# Patient Record
Sex: FEMALE | Race: BLACK OR AFRICAN AMERICAN | NOT HISPANIC OR LATINO | Employment: OTHER | URBAN - METROPOLITAN AREA
[De-identification: names, ages, dates, MRNs, and addresses within clinical notes are randomized per-mention and may not be internally consistent; named-entity substitution may affect disease eponyms.]

---

## 2017-08-14 ENCOUNTER — TRANSCRIBE ORDERS (OUTPATIENT)
Dept: ADMINISTRATIVE | Facility: HOSPITAL | Age: 62
End: 2017-08-14

## 2017-08-14 DIAGNOSIS — E04.1 THYROID NODULE: Primary | ICD-10-CM

## 2017-08-21 ENCOUNTER — HOSPITAL ENCOUNTER (OUTPATIENT)
Dept: ULTRASOUND IMAGING | Facility: HOSPITAL | Age: 62
Discharge: HOME | End: 2017-08-21
Attending: INTERNAL MEDICINE | Admitting: INTERNAL MEDICINE

## 2017-08-21 DIAGNOSIS — E04.1 THYROID NODULE: ICD-10-CM

## 2017-08-21 PROCEDURE — 76536 US EXAM OF HEAD AND NECK: CPT

## 2020-01-28 ENCOUNTER — HOSPITAL ENCOUNTER (OUTPATIENT)
Dept: GENERAL RADIOLOGY | Facility: HOSPITAL | Age: 65
Discharge: HOME | End: 2020-01-28

## 2020-01-28 ENCOUNTER — HOSPITAL ENCOUNTER (OUTPATIENT)
Dept: GENERAL RADIOLOGY | Facility: HOSPITAL | Age: 65
Discharge: HOME | End: 2020-01-28
Admitting: ORTHOPAEDIC SURGERY

## 2020-01-28 ENCOUNTER — APPOINTMENT (OUTPATIENT)
Dept: PREADMISSION TESTING | Facility: HOSPITAL | Age: 65
End: 2020-01-28

## 2020-01-28 VITALS
BODY MASS INDEX: 30.49 KG/M2 | HEART RATE: 100 BPM | OXYGEN SATURATION: 95 % | DIASTOLIC BLOOD PRESSURE: 73 MMHG | WEIGHT: 183 LBS | RESPIRATION RATE: 16 BRPM | TEMPERATURE: 97.4 F | SYSTOLIC BLOOD PRESSURE: 124 MMHG | HEIGHT: 65 IN

## 2020-01-28 LAB
ABO GROUP BLD: NORMAL
ALBUMIN SERPL-MCNC: 4 G/DL (ref 3.5–5.2)
ALBUMIN/GLOB SERPL: 1.3 G/DL
ALP SERPL-CCNC: 82 U/L (ref 39–117)
ALT SERPL W P-5'-P-CCNC: 20 U/L (ref 1–33)
ANION GAP SERPL CALCULATED.3IONS-SCNC: 12 MMOL/L (ref 5–15)
APTT PPP: 27 SECONDS (ref 22.7–35.4)
AST SERPL-CCNC: 19 U/L (ref 1–32)
BACTERIA UR QL AUTO: ABNORMAL /HPF
BASOPHILS # BLD AUTO: 0.03 10*3/MM3 (ref 0–0.2)
BASOPHILS NFR BLD AUTO: 0.4 % (ref 0–1.5)
BILIRUB SERPL-MCNC: 0.4 MG/DL (ref 0.2–1.2)
BILIRUB UR QL STRIP: NEGATIVE
BLD GP AB SCN SERPL QL: NEGATIVE
BUN BLD-MCNC: 13 MG/DL (ref 8–23)
BUN/CREAT SERPL: 14.8 (ref 7–25)
CALCIUM SPEC-SCNC: 8.8 MG/DL (ref 8.6–10.5)
CHLORIDE SERPL-SCNC: 103 MMOL/L (ref 98–107)
CLARITY UR: CLEAR
CO2 SERPL-SCNC: 27 MMOL/L (ref 22–29)
COLOR UR: YELLOW
CREAT BLD-MCNC: 0.88 MG/DL (ref 0.57–1)
DEPRECATED RDW RBC AUTO: 39.6 FL (ref 37–54)
EOSINOPHIL # BLD AUTO: 0.32 10*3/MM3 (ref 0–0.4)
EOSINOPHIL NFR BLD AUTO: 4.2 % (ref 0.3–6.2)
ERYTHROCYTE [DISTWIDTH] IN BLOOD BY AUTOMATED COUNT: 12.4 % (ref 12.3–15.4)
GFR SERPL CREATININE-BSD FRML MDRD: 79 ML/MIN/1.73
GLOBULIN UR ELPH-MCNC: 3 GM/DL
GLUCOSE BLD-MCNC: 121 MG/DL (ref 65–99)
GLUCOSE UR STRIP-MCNC: NEGATIVE MG/DL
HCT VFR BLD AUTO: 39.3 % (ref 34–46.6)
HGB BLD-MCNC: 13.1 G/DL (ref 12–15.9)
HGB UR QL STRIP.AUTO: NEGATIVE
HYALINE CASTS UR QL AUTO: ABNORMAL /LPF
IMM GRANULOCYTES # BLD AUTO: 0.01 10*3/MM3 (ref 0–0.05)
IMM GRANULOCYTES NFR BLD AUTO: 0.1 % (ref 0–0.5)
INR PPP: 1.08 (ref 0.9–1.1)
KETONES UR QL STRIP: NEGATIVE
LEUKOCYTE ESTERASE UR QL STRIP.AUTO: ABNORMAL
LYMPHOCYTES # BLD AUTO: 2.19 10*3/MM3 (ref 0.7–3.1)
LYMPHOCYTES NFR BLD AUTO: 29 % (ref 19.6–45.3)
MCH RBC QN AUTO: 29.5 PG (ref 26.6–33)
MCHC RBC AUTO-ENTMCNC: 33.3 G/DL (ref 31.5–35.7)
MCV RBC AUTO: 88.5 FL (ref 79–97)
MONOCYTES # BLD AUTO: 0.49 10*3/MM3 (ref 0.1–0.9)
MONOCYTES NFR BLD AUTO: 6.5 % (ref 5–12)
NEUTROPHILS # BLD AUTO: 4.52 10*3/MM3 (ref 1.7–7)
NEUTROPHILS NFR BLD AUTO: 59.8 % (ref 42.7–76)
NITRITE UR QL STRIP: NEGATIVE
NRBC BLD AUTO-RTO: 0 /100 WBC (ref 0–0.2)
PH UR STRIP.AUTO: 5.5 [PH] (ref 5–8)
PLATELET # BLD AUTO: 285 10*3/MM3 (ref 140–450)
PMV BLD AUTO: 9.8 FL (ref 6–12)
POTASSIUM BLD-SCNC: 4 MMOL/L (ref 3.5–5.2)
PROT SERPL-MCNC: 7 G/DL (ref 6–8.5)
PROT UR QL STRIP: NEGATIVE
PROTHROMBIN TIME: 13.7 SECONDS (ref 11.7–14.2)
RBC # BLD AUTO: 4.44 10*6/MM3 (ref 3.77–5.28)
RBC # UR: ABNORMAL /HPF
REF LAB TEST METHOD: ABNORMAL
RH BLD: POSITIVE
SODIUM BLD-SCNC: 142 MMOL/L (ref 136–145)
SP GR UR STRIP: 1.02 (ref 1–1.03)
SQUAMOUS #/AREA URNS HPF: ABNORMAL /HPF
T&S EXPIRATION DATE: NORMAL
UROBILINOGEN UR QL STRIP: ABNORMAL
WBC NRBC COR # BLD: 7.56 10*3/MM3 (ref 3.4–10.8)
WBC UR QL AUTO: ABNORMAL /HPF

## 2020-01-28 PROCEDURE — 85730 THROMBOPLASTIN TIME PARTIAL: CPT | Performed by: ORTHOPAEDIC SURGERY

## 2020-01-28 PROCEDURE — 86901 BLOOD TYPING SEROLOGIC RH(D): CPT | Performed by: ORTHOPAEDIC SURGERY

## 2020-01-28 PROCEDURE — 71046 X-RAY EXAM CHEST 2 VIEWS: CPT

## 2020-01-28 PROCEDURE — 85025 COMPLETE CBC W/AUTO DIFF WBC: CPT | Performed by: ORTHOPAEDIC SURGERY

## 2020-01-28 PROCEDURE — 85610 PROTHROMBIN TIME: CPT | Performed by: ORTHOPAEDIC SURGERY

## 2020-01-28 PROCEDURE — 86850 RBC ANTIBODY SCREEN: CPT | Performed by: ORTHOPAEDIC SURGERY

## 2020-01-28 PROCEDURE — 81001 URINALYSIS AUTO W/SCOPE: CPT | Performed by: ORTHOPAEDIC SURGERY

## 2020-01-28 PROCEDURE — 73560 X-RAY EXAM OF KNEE 1 OR 2: CPT

## 2020-01-28 PROCEDURE — 86900 BLOOD TYPING SEROLOGIC ABO: CPT | Performed by: ORTHOPAEDIC SURGERY

## 2020-01-28 PROCEDURE — 36415 COLL VENOUS BLD VENIPUNCTURE: CPT

## 2020-01-28 PROCEDURE — 80053 COMPREHEN METABOLIC PANEL: CPT | Performed by: ORTHOPAEDIC SURGERY

## 2020-01-28 RX ORDER — GABAPENTIN 300 MG/1
300 CAPSULE ORAL 3 TIMES DAILY
COMMUNITY

## 2020-01-28 RX ORDER — CYCLOBENZAPRINE HCL 10 MG
10 TABLET ORAL 2 TIMES DAILY PRN
COMMUNITY
Start: 2018-07-09

## 2020-01-28 RX ORDER — ASPIRIN 81 MG/1
81 TABLET ORAL DAILY
COMMUNITY

## 2020-01-28 ASSESSMENT — KOOS JR
KOOS JR SCORE: 9
KOOS JR SCORE: 63.776

## 2020-01-28 NOTE — DISCHARGE INSTRUCTIONS
Take the following medications the morning of surgery:  DULERA INHALER      General Instructions: CLEAR LIQUIDS UNTIL 5:30 AM MORNING OF SURGERY  • Do not eat solid food after midnight the night before surgery.  • You may drink clear liquids day of surgery but must stop at least one hour before your hospital arrival time.  • It is beneficial for you to have a clear drink that contains carbohydrates the day of surgery.  We suggest a 12 to 20 ounce bottle of Gatorade or Powerade for non-diabetic patients or a 12 to 20 ounce bottle of G2 or Powerade Zero for diabetic patients. (Pediatric patients, are not advised to drink a 12 to 20 ounce carbohydrate drink)    Clear liquids are liquids you can see through.  Nothing red in color.     Plain water                               Sports drinks  Sodas                                   Gelatin (Jell-O)  Fruit juices without pulp such as white grape juice and apple juice  Popsicles that contain no fruit or yogurt  Tea or coffee (no cream or milk added)  Gatorade / Powerade  G2 / Powerade Zero    • Infants may have breast milk up to four hours before surgery.  • Infants drinking formula may drink formula up to six hours before surgery.   • Patients who avoid smoking, chewing tobacco and alcohol for 4 weeks prior to surgery have a reduced risk of post-operative complications.  Quit smoking as many days before surgery as you can.  • Do not smoke, use chewing tobacco or drink alcohol the day of surgery.   • If applicable bring your C-PAP/ BI-PAP machine.  • Bring any papers given to you in the doctor’s office.  • Wear clean comfortable clothes.  • Do not wear contact lenses, false eyelashes or make-up.  Bring a case for your glasses.   • Bring crutches or walker if applicable.  • Remove all piercings.  Leave jewelry and any other valuables at home.  • Hair extensions with metal clips must be removed prior to surgery.  • The Pre-Admission Testing nurse will instruct you to bring  medications if unable to obtain an accurate list in Pre-Admission Testing.        If you were given a blood bank ID arm band remember to bring it with you the day of surgery.    Preventing a Surgical Site Infection:  • For 2 to 3 days before surgery, avoid shaving with a razor because the razor can irritate skin and make it easier to develop an infection.    • Any areas of open skin can increase the risk of a post-operative wound infection by allowing bacteria to enter and travel throughout the body.  Notify your surgeon if you have any skin wounds / rashes even if it is not near the expected surgical site.  The area will need assessed to determine if surgery should be delayed until it is healed.  • The night prior to surgery sleep in a clean bed with clean clothing.  Do not allow pets to sleep with you.  • Shower on the morning of surgery using a fresh bar of anti-bacterial soap (such as Dial) and clean washcloth.  Dry with a clean towel and dress in clean clothing.  • Ask your surgeon if you will be receiving antibiotics prior to surgery.  • Make sure you, your family, and all healthcare providers clean their hands with soap and water or an alcohol based hand  before caring for you or your wound.    Day of surgery: 2/11/2020 ARRIVAL TIME 6:30 AM  Your arrival time is approximately two hours before your scheduled surgery time.  Upon arrival, a Pre-op nurse and Anesthesiologist will review your health history, obtain vital signs, and answer questions you may have.  The only belongings needed at this time will be a list of your home medications and if applicable your C-PAP/BI-PAP machine.  If you are staying overnight your family can leave the rest of your belongings in the car and bring them to your room later.  A Pre-op nurse will start an IV and you may receive medication in preparation for surgery, including something to help you relax.  Your family will be able to see you in the Pre-op area.  Two  visitors at a time will be allowed in the Pre-op room.  While you are in surgery your family should notify the waiting room  if they leave the waiting room area and provide a contact phone number.    Please be aware that surgery does come with discomfort.  We want to make every effort to control your discomfort so please discuss any uncontrolled symptoms with your nurse.   Your doctor will most likely have prescribed pain medications.      If you are going home after surgery you will receive individualized written care instructions before being discharged.  A responsible adult must drive you to and from the hospital on the day of your surgery and stay with you for 24 hours.    If you are staying overnight following surgery, you will be transported to your hospital room following the recovery period.  [facility] has all private rooms.    If you have any questions please call Pre-Admission Testing at [phone].  Deductibles and co-payments are collected on the day of service. Please be prepared to pay the required co-pay, deductible or deposit on the day of service as defined by your plan.  2% CHLORHEXIDINE GLUCONATE* CLOTH  Preparing or “prepping” skin before surgery can reduce the risk of infection at the surgical site. To make the process easier, [facility] has chosen disposable cloths moistened with a rinse-free, 2% Chlorhexidine Gluconate (CHG) antiseptic solution. The steps below outline the prepping process and should be carefully followed.        Use the prep cloth on the area that is circled in the diagram             Directions Night before Surgery  1) Shower using a fresh bar of anti-bacterial soap (such as Dial) and clean washcloth.  Use a clean towel to completely dry your skin.  2) Do not use any lotions, oils or creams on your skin.  3) Open the package and remove 1 cloth, wipe your skin for 30 seconds in a circular motion.  Allow to dry for 3  minutes.  4) Repeat #3 with second cloth.  5) Do not touch your eyes, ears, or mouth with the prep cloth.  6) Allow the wet prep solution to air dry.  7) Discard the prep cloth and wash your hands with soap and water.   8) Dress in clean bed clothes and sleep on fresh clean bed sheets.   9) You may experience some temporary itching after the prep.    Directions Day of Surgery  1) Repeat steps 1,2,3,4,5,6,7, and 9.   2) Dress in clean clothes before coming to the hospital.    BACTROBAN NASAL OINTMENT  There are many germs normally in your nose. Bactroban is an ointment that will help reduce these germs. Please follow these instructions for Bactroban use:      ____The day before surgery in the morning  Date________    ____The day before surgery in the evening              Date________    ____The day of surgery in the morning    Date________    **Squirt ½ package of Bactroban Ointment onto a cotton applicator and apply to inside of 1st nostril.  Squirt the remaining Bactroban and apply to the inside of the other nostril.

## 2020-02-11 ENCOUNTER — APPOINTMENT (OUTPATIENT)
Dept: GENERAL RADIOLOGY | Facility: HOSPITAL | Age: 65
End: 2020-02-11

## 2020-02-11 ENCOUNTER — ANESTHESIA (OUTPATIENT)
Dept: PERIOP | Facility: HOSPITAL | Age: 65
End: 2020-02-11

## 2020-02-11 ENCOUNTER — HOSPITAL ENCOUNTER (INPATIENT)
Facility: HOSPITAL | Age: 65
LOS: 1 days | Discharge: HOME HEALTH | End: 2020-02-12
Attending: ORTHOPAEDIC SURGERY | Admitting: ORTHOPAEDIC SURGERY

## 2020-02-11 ENCOUNTER — ANESTHESIA EVENT (OUTPATIENT)
Dept: PERIOP | Facility: HOSPITAL | Age: 65
End: 2020-02-11

## 2020-02-11 DIAGNOSIS — Z96.659 STATUS POST TOTAL KNEE REPLACEMENT, UNSPECIFIED LATERALITY: Primary | ICD-10-CM

## 2020-02-11 LAB
GLUCOSE BLDC GLUCOMTR-MCNC: 102 MG/DL (ref 70–130)
GLUCOSE BLDC GLUCOMTR-MCNC: 135 MG/DL (ref 70–130)
GLUCOSE BLDC GLUCOMTR-MCNC: 152 MG/DL (ref 70–130)

## 2020-02-11 PROCEDURE — 97161 PT EVAL LOW COMPLEX 20 MIN: CPT

## 2020-02-11 PROCEDURE — 25010000002 ONDANSETRON PER 1 MG: Performed by: NURSE ANESTHETIST, CERTIFIED REGISTERED

## 2020-02-11 PROCEDURE — 25010000002 NEOSTIGMINE 0.5 MG/ML SOLUTION: Performed by: NURSE ANESTHETIST, CERTIFIED REGISTERED

## 2020-02-11 PROCEDURE — 97110 THERAPEUTIC EXERCISES: CPT

## 2020-02-11 PROCEDURE — C9290 INJ, BUPIVACAINE LIPOSOME: HCPCS | Performed by: ORTHOPAEDIC SURGERY

## 2020-02-11 PROCEDURE — 82962 GLUCOSE BLOOD TEST: CPT | Performed by: INTERNAL MEDICINE

## 2020-02-11 PROCEDURE — 25010000002 VANCOMYCIN 10 G RECONSTITUTED SOLUTION: Performed by: ORTHOPAEDIC SURGERY

## 2020-02-11 PROCEDURE — 73560 X-RAY EXAM OF KNEE 1 OR 2: CPT

## 2020-02-11 PROCEDURE — 25010000002 HYDROMORPHONE PER 4 MG: Performed by: NURSE ANESTHETIST, CERTIFIED REGISTERED

## 2020-02-11 PROCEDURE — C1776 JOINT DEVICE (IMPLANTABLE): HCPCS | Performed by: ORTHOPAEDIC SURGERY

## 2020-02-11 PROCEDURE — 25010000002 FENTANYL CITRATE (PF) 100 MCG/2ML SOLUTION: Performed by: NURSE ANESTHETIST, CERTIFIED REGISTERED

## 2020-02-11 PROCEDURE — C1713 ANCHOR/SCREW BN/BN,TIS/BN: HCPCS | Performed by: ORTHOPAEDIC SURGERY

## 2020-02-11 PROCEDURE — 25010000002 FENTANYL CITRATE (PF) 100 MCG/2ML SOLUTION

## 2020-02-11 PROCEDURE — 25010000002 PROPOFOL 10 MG/ML EMULSION: Performed by: NURSE ANESTHETIST, CERTIFIED REGISTERED

## 2020-02-11 PROCEDURE — 0SRC069 REPLACEMENT OF RIGHT KNEE JOINT WITH OXIDIZED ZIRCONIUM ON POLYETHYLENE SYNTHETIC SUBSTITUTE, CEMENTED, OPEN APPROACH: ICD-10-PCS | Performed by: ORTHOPAEDIC SURGERY

## 2020-02-11 PROCEDURE — 82962 GLUCOSE BLOOD TEST: CPT

## 2020-02-11 PROCEDURE — 25010000003 CEFAZOLIN IN DEXTROSE 2-4 GM/100ML-% SOLUTION: Performed by: NURSE ANESTHETIST, CERTIFIED REGISTERED

## 2020-02-11 PROCEDURE — 25010000002 DEXAMETHASONE PER 1 MG: Performed by: NURSE ANESTHETIST, CERTIFIED REGISTERED

## 2020-02-11 PROCEDURE — 25010000003 CEFAZOLIN IN DEXTROSE 2-4 GM/100ML-% SOLUTION: Performed by: ORTHOPAEDIC SURGERY

## 2020-02-11 PROCEDURE — 25010000003 BUPIVACAINE LIPOSOME 1.3 % SUSPENSION 20 ML VIAL: Performed by: ORTHOPAEDIC SURGERY

## 2020-02-11 DEVICE — JOURNEY II BCS XLPE ARTICULAR                                    INSERT SIZE 3-4 RIGHT 10MM
Type: IMPLANTABLE DEVICE | Site: KNEE | Status: FUNCTIONAL
Brand: JOURNEY

## 2020-02-11 DEVICE — JOURNEY II BCS FEMORAL OXINIUM                                    RIGHT SIZE 6
Type: IMPLANTABLE DEVICE | Site: KNEE | Status: FUNCTIONAL
Brand: JOURNEY

## 2020-02-11 DEVICE — JOURNEY 7.5 ROUND RESURF PAT 32MM STANDARD
Type: IMPLANTABLE DEVICE | Site: KNEE | Status: FUNCTIONAL
Brand: JOURNEY

## 2020-02-11 DEVICE — JOURNEY TIBIAL BASEPLATE NONPOROUS                                    RIGHT SIZE 4
Type: IMPLANTABLE DEVICE | Site: KNEE | Status: FUNCTIONAL
Brand: JOURNEY

## 2020-02-11 DEVICE — IMPLANTABLE DEVICE: Type: IMPLANTABLE DEVICE | Site: KNEE | Status: FUNCTIONAL

## 2020-02-11 DEVICE — CMT BONE PALACOS R HI/VISC 1X40: Type: IMPLANTABLE DEVICE | Site: KNEE | Status: FUNCTIONAL

## 2020-02-11 RX ORDER — LIDOCAINE HYDROCHLORIDE 20 MG/ML
INJECTION, SOLUTION INFILTRATION; PERINEURAL AS NEEDED
Status: DISCONTINUED | OUTPATIENT
Start: 2020-02-11 | End: 2020-02-11 | Stop reason: SURG

## 2020-02-11 RX ORDER — EPHEDRINE SULFATE 50 MG/ML
5 INJECTION, SOLUTION INTRAVENOUS ONCE AS NEEDED
Status: DISCONTINUED | OUTPATIENT
Start: 2020-02-11 | End: 2020-02-11 | Stop reason: HOSPADM

## 2020-02-11 RX ORDER — PROMETHAZINE HYDROCHLORIDE 25 MG/1
25 TABLET ORAL ONCE AS NEEDED
Status: DISCONTINUED | OUTPATIENT
Start: 2020-02-11 | End: 2020-02-11 | Stop reason: HOSPADM

## 2020-02-11 RX ORDER — DOCUSATE SODIUM 100 MG/1
100 CAPSULE, LIQUID FILLED ORAL 2 TIMES DAILY PRN
Status: DISCONTINUED | OUTPATIENT
Start: 2020-02-11 | End: 2020-02-12 | Stop reason: HOSPADM

## 2020-02-11 RX ORDER — FAMOTIDINE 20 MG/1
40 TABLET, FILM COATED ORAL DAILY
Status: DISCONTINUED | OUTPATIENT
Start: 2020-02-11 | End: 2020-02-12 | Stop reason: HOSPADM

## 2020-02-11 RX ORDER — BISACODYL 10 MG
10 SUPPOSITORY, RECTAL RECTAL DAILY PRN
Status: DISCONTINUED | OUTPATIENT
Start: 2020-02-11 | End: 2020-02-12 | Stop reason: HOSPADM

## 2020-02-11 RX ORDER — PROMETHAZINE HYDROCHLORIDE 25 MG/1
25 SUPPOSITORY RECTAL ONCE AS NEEDED
Status: DISCONTINUED | OUTPATIENT
Start: 2020-02-11 | End: 2020-02-11 | Stop reason: HOSPADM

## 2020-02-11 RX ORDER — ACETAMINOPHEN 500 MG
1000 TABLET ORAL ONCE
Status: COMPLETED | OUTPATIENT
Start: 2020-02-11 | End: 2020-02-11

## 2020-02-11 RX ORDER — LIDOCAINE HYDROCHLORIDE 10 MG/ML
0.5 INJECTION, SOLUTION EPIDURAL; INFILTRATION; INTRACAUDAL; PERINEURAL ONCE AS NEEDED
Status: DISCONTINUED | OUTPATIENT
Start: 2020-02-11 | End: 2020-02-11 | Stop reason: HOSPADM

## 2020-02-11 RX ORDER — NALOXONE HCL 0.4 MG/ML
0.1 VIAL (ML) INJECTION
Status: DISCONTINUED | OUTPATIENT
Start: 2020-02-11 | End: 2020-02-12 | Stop reason: HOSPADM

## 2020-02-11 RX ORDER — SODIUM CHLORIDE 0.9 % (FLUSH) 0.9 %
3-10 SYRINGE (ML) INJECTION AS NEEDED
Status: DISCONTINUED | OUTPATIENT
Start: 2020-02-11 | End: 2020-02-11 | Stop reason: HOSPADM

## 2020-02-11 RX ORDER — FENTANYL CITRATE 50 UG/ML
50 INJECTION, SOLUTION INTRAMUSCULAR; INTRAVENOUS
Status: DISCONTINUED | OUTPATIENT
Start: 2020-02-11 | End: 2020-02-11 | Stop reason: HOSPADM

## 2020-02-11 RX ORDER — HYDROMORPHONE HYDROCHLORIDE 1 MG/ML
0.5 INJECTION, SOLUTION INTRAMUSCULAR; INTRAVENOUS; SUBCUTANEOUS
Status: DISCONTINUED | OUTPATIENT
Start: 2020-02-11 | End: 2020-02-11 | Stop reason: HOSPADM

## 2020-02-11 RX ORDER — ACETAMINOPHEN 325 MG/1
650 TABLET ORAL ONCE AS NEEDED
Status: DISCONTINUED | OUTPATIENT
Start: 2020-02-11 | End: 2020-02-11 | Stop reason: HOSPADM

## 2020-02-11 RX ORDER — DIPHENHYDRAMINE HCL 50 MG
50 CAPSULE ORAL EVERY 6 HOURS PRN
Status: DISCONTINUED | OUTPATIENT
Start: 2020-02-11 | End: 2020-02-12 | Stop reason: HOSPADM

## 2020-02-11 RX ORDER — UREA 10 %
1 LOTION (ML) TOPICAL NIGHTLY PRN
Status: DISCONTINUED | OUTPATIENT
Start: 2020-02-11 | End: 2020-02-12 | Stop reason: HOSPADM

## 2020-02-11 RX ORDER — OXYCODONE AND ACETAMINOPHEN 7.5; 325 MG/1; MG/1
1 TABLET ORAL ONCE AS NEEDED
Status: DISCONTINUED | OUTPATIENT
Start: 2020-02-11 | End: 2020-02-11 | Stop reason: HOSPADM

## 2020-02-11 RX ORDER — DIPHENHYDRAMINE HYDROCHLORIDE 50 MG/ML
12.5 INJECTION INTRAMUSCULAR; INTRAVENOUS
Status: DISCONTINUED | OUTPATIENT
Start: 2020-02-11 | End: 2020-02-11 | Stop reason: HOSPADM

## 2020-02-11 RX ORDER — ONDANSETRON 4 MG/1
4 TABLET, FILM COATED ORAL EVERY 6 HOURS PRN
Status: DISCONTINUED | OUTPATIENT
Start: 2020-02-11 | End: 2020-02-12 | Stop reason: HOSPADM

## 2020-02-11 RX ORDER — LABETALOL HYDROCHLORIDE 5 MG/ML
5 INJECTION, SOLUTION INTRAVENOUS
Status: DISCONTINUED | OUTPATIENT
Start: 2020-02-11 | End: 2020-02-11 | Stop reason: HOSPADM

## 2020-02-11 RX ORDER — ONDANSETRON 2 MG/ML
4 INJECTION INTRAMUSCULAR; INTRAVENOUS ONCE AS NEEDED
Status: DISCONTINUED | OUTPATIENT
Start: 2020-02-11 | End: 2020-02-11 | Stop reason: HOSPADM

## 2020-02-11 RX ORDER — PROMETHAZINE HYDROCHLORIDE 25 MG/ML
6.25 INJECTION, SOLUTION INTRAMUSCULAR; INTRAVENOUS
Status: DISCONTINUED | OUTPATIENT
Start: 2020-02-11 | End: 2020-02-11 | Stop reason: HOSPADM

## 2020-02-11 RX ORDER — CEFAZOLIN SODIUM 2 G/100ML
2 INJECTION, SOLUTION INTRAVENOUS EVERY 8 HOURS
Status: COMPLETED | OUTPATIENT
Start: 2020-02-11 | End: 2020-02-12

## 2020-02-11 RX ORDER — TRANEXAMIC ACID 100 MG/ML
INJECTION, SOLUTION INTRAVENOUS AS NEEDED
Status: DISCONTINUED | OUTPATIENT
Start: 2020-02-11 | End: 2020-02-11 | Stop reason: SURG

## 2020-02-11 RX ORDER — MELOXICAM 15 MG/1
15 TABLET ORAL DAILY
Status: DISCONTINUED | OUTPATIENT
Start: 2020-02-11 | End: 2020-02-12 | Stop reason: HOSPADM

## 2020-02-11 RX ORDER — PROPOFOL 10 MG/ML
VIAL (ML) INTRAVENOUS AS NEEDED
Status: DISCONTINUED | OUTPATIENT
Start: 2020-02-11 | End: 2020-02-11 | Stop reason: SURG

## 2020-02-11 RX ORDER — ACETAMINOPHEN 650 MG/1
650 SUPPOSITORY RECTAL EVERY 8 HOURS
Status: DISCONTINUED | OUTPATIENT
Start: 2020-02-11 | End: 2020-02-12 | Stop reason: HOSPADM

## 2020-02-11 RX ORDER — FLUMAZENIL 0.1 MG/ML
0.2 INJECTION INTRAVENOUS AS NEEDED
Status: DISCONTINUED | OUTPATIENT
Start: 2020-02-11 | End: 2020-02-11 | Stop reason: HOSPADM

## 2020-02-11 RX ORDER — MIDAZOLAM HYDROCHLORIDE 1 MG/ML
1 INJECTION INTRAMUSCULAR; INTRAVENOUS
Status: DISCONTINUED | OUTPATIENT
Start: 2020-02-11 | End: 2020-02-11 | Stop reason: HOSPADM

## 2020-02-11 RX ORDER — MAGNESIUM HYDROXIDE 1200 MG/15ML
LIQUID ORAL AS NEEDED
Status: DISCONTINUED | OUTPATIENT
Start: 2020-02-11 | End: 2020-02-11 | Stop reason: HOSPADM

## 2020-02-11 RX ORDER — ACETAMINOPHEN 500 MG
1000 TABLET ORAL EVERY 8 HOURS
Status: DISCONTINUED | OUTPATIENT
Start: 2020-02-11 | End: 2020-02-12 | Stop reason: HOSPADM

## 2020-02-11 RX ORDER — FAMOTIDINE 10 MG/ML
20 INJECTION, SOLUTION INTRAVENOUS ONCE
Status: COMPLETED | OUTPATIENT
Start: 2020-02-11 | End: 2020-02-11

## 2020-02-11 RX ORDER — SODIUM CHLORIDE 9 MG/ML
100 INJECTION, SOLUTION INTRAVENOUS CONTINUOUS
Status: DISCONTINUED | OUTPATIENT
Start: 2020-02-11 | End: 2020-02-12 | Stop reason: HOSPADM

## 2020-02-11 RX ORDER — HYDROCODONE BITARTRATE AND ACETAMINOPHEN 5; 325 MG/1; MG/1
1 TABLET ORAL EVERY 4 HOURS PRN
Status: DISCONTINUED | OUTPATIENT
Start: 2020-02-11 | End: 2020-02-12 | Stop reason: HOSPADM

## 2020-02-11 RX ORDER — MIDAZOLAM HYDROCHLORIDE 1 MG/ML
2 INJECTION INTRAMUSCULAR; INTRAVENOUS
Status: DISCONTINUED | OUTPATIENT
Start: 2020-02-11 | End: 2020-02-11 | Stop reason: HOSPADM

## 2020-02-11 RX ORDER — CEFAZOLIN SODIUM 2 G/100ML
INJECTION, SOLUTION INTRAVENOUS AS NEEDED
Status: DISCONTINUED | OUTPATIENT
Start: 2020-02-11 | End: 2020-02-11 | Stop reason: SURG

## 2020-02-11 RX ORDER — DEXAMETHASONE SODIUM PHOSPHATE 4 MG/ML
INJECTION, SOLUTION INTRA-ARTICULAR; INTRALESIONAL; INTRAMUSCULAR; INTRAVENOUS; SOFT TISSUE AS NEEDED
Status: DISCONTINUED | OUTPATIENT
Start: 2020-02-11 | End: 2020-02-11 | Stop reason: SURG

## 2020-02-11 RX ORDER — HYDROMORPHONE HCL 110MG/55ML
PATIENT CONTROLLED ANALGESIA SYRINGE INTRAVENOUS AS NEEDED
Status: DISCONTINUED | OUTPATIENT
Start: 2020-02-11 | End: 2020-02-11 | Stop reason: SURG

## 2020-02-11 RX ORDER — DICYCLOMINE HCL 20 MG
20 TABLET ORAL 3 TIMES DAILY
COMMUNITY

## 2020-02-11 RX ORDER — CEFAZOLIN SODIUM 2 G/100ML
2 INJECTION, SOLUTION INTRAVENOUS ONCE
Status: DISCONTINUED | OUTPATIENT
Start: 2020-02-11 | End: 2020-02-11 | Stop reason: HOSPADM

## 2020-02-11 RX ORDER — DIPHENHYDRAMINE HYDROCHLORIDE 50 MG/ML
25 INJECTION INTRAMUSCULAR; INTRAVENOUS EVERY 6 HOURS PRN
Status: DISCONTINUED | OUTPATIENT
Start: 2020-02-11 | End: 2020-02-12 | Stop reason: HOSPADM

## 2020-02-11 RX ORDER — WOUND DRESSING ADHESIVE - LIQUID
LIQUID MISCELLANEOUS AS NEEDED
Status: DISCONTINUED | OUTPATIENT
Start: 2020-02-11 | End: 2020-02-11 | Stop reason: HOSPADM

## 2020-02-11 RX ORDER — CYCLOBENZAPRINE HCL 10 MG
10 TABLET ORAL 3 TIMES DAILY PRN
Status: DISCONTINUED | OUTPATIENT
Start: 2020-02-11 | End: 2020-02-12 | Stop reason: HOSPADM

## 2020-02-11 RX ORDER — SENNA AND DOCUSATE SODIUM 50; 8.6 MG/1; MG/1
2 TABLET, FILM COATED ORAL NIGHTLY
Status: DISCONTINUED | OUTPATIENT
Start: 2020-02-11 | End: 2020-02-12 | Stop reason: HOSPADM

## 2020-02-11 RX ORDER — HYDRALAZINE HYDROCHLORIDE 20 MG/ML
5 INJECTION INTRAMUSCULAR; INTRAVENOUS
Status: DISCONTINUED | OUTPATIENT
Start: 2020-02-11 | End: 2020-02-11 | Stop reason: HOSPADM

## 2020-02-11 RX ORDER — DIPHENHYDRAMINE HCL 25 MG
25 CAPSULE ORAL
Status: DISCONTINUED | OUTPATIENT
Start: 2020-02-11 | End: 2020-02-11 | Stop reason: HOSPADM

## 2020-02-11 RX ORDER — ONDANSETRON 2 MG/ML
INJECTION INTRAMUSCULAR; INTRAVENOUS AS NEEDED
Status: DISCONTINUED | OUTPATIENT
Start: 2020-02-11 | End: 2020-02-11 | Stop reason: SURG

## 2020-02-11 RX ORDER — ROFLUMILAST 500 UG/1
TABLET ORAL DAILY
COMMUNITY

## 2020-02-11 RX ORDER — CELECOXIB 200 MG/1
200 CAPSULE ORAL ONCE
Status: COMPLETED | OUTPATIENT
Start: 2020-02-11 | End: 2020-02-11

## 2020-02-11 RX ORDER — PROMETHAZINE HYDROCHLORIDE 25 MG/ML
12.5 INJECTION, SOLUTION INTRAMUSCULAR; INTRAVENOUS ONCE AS NEEDED
Status: DISCONTINUED | OUTPATIENT
Start: 2020-02-11 | End: 2020-02-11 | Stop reason: HOSPADM

## 2020-02-11 RX ORDER — FENTANYL CITRATE 50 UG/ML
INJECTION, SOLUTION INTRAMUSCULAR; INTRAVENOUS
Status: COMPLETED
Start: 2020-02-11 | End: 2020-02-11

## 2020-02-11 RX ORDER — FENTANYL CITRATE 50 UG/ML
INJECTION, SOLUTION INTRAMUSCULAR; INTRAVENOUS AS NEEDED
Status: DISCONTINUED | OUTPATIENT
Start: 2020-02-11 | End: 2020-02-11 | Stop reason: SURG

## 2020-02-11 RX ORDER — ASPIRIN 81 MG/1
81 TABLET ORAL DAILY
Status: DISCONTINUED | OUTPATIENT
Start: 2020-02-11 | End: 2020-02-12 | Stop reason: HOSPADM

## 2020-02-11 RX ORDER — GABAPENTIN 300 MG/1
300 CAPSULE ORAL 3 TIMES DAILY
Status: DISCONTINUED | OUTPATIENT
Start: 2020-02-11 | End: 2020-02-12 | Stop reason: HOSPADM

## 2020-02-11 RX ORDER — HYDROCODONE BITARTRATE AND ACETAMINOPHEN 7.5; 325 MG/1; MG/1
1 TABLET ORAL ONCE AS NEEDED
Status: COMPLETED | OUTPATIENT
Start: 2020-02-11 | End: 2020-02-11

## 2020-02-11 RX ORDER — ROCURONIUM BROMIDE 10 MG/ML
INJECTION, SOLUTION INTRAVENOUS AS NEEDED
Status: DISCONTINUED | OUTPATIENT
Start: 2020-02-11 | End: 2020-02-11 | Stop reason: SURG

## 2020-02-11 RX ORDER — GLYCOPYRROLATE 0.2 MG/ML
INJECTION INTRAMUSCULAR; INTRAVENOUS AS NEEDED
Status: DISCONTINUED | OUTPATIENT
Start: 2020-02-11 | End: 2020-02-11 | Stop reason: SURG

## 2020-02-11 RX ORDER — HYDROCODONE BITARTRATE AND ACETAMINOPHEN 5; 325 MG/1; MG/1
2 TABLET ORAL EVERY 4 HOURS PRN
Status: DISCONTINUED | OUTPATIENT
Start: 2020-02-11 | End: 2020-02-12 | Stop reason: HOSPADM

## 2020-02-11 RX ORDER — SODIUM CHLORIDE 0.9 % (FLUSH) 0.9 %
3 SYRINGE (ML) INJECTION EVERY 12 HOURS SCHEDULED
Status: DISCONTINUED | OUTPATIENT
Start: 2020-02-11 | End: 2020-02-11 | Stop reason: HOSPADM

## 2020-02-11 RX ORDER — ONDANSETRON 2 MG/ML
4 INJECTION INTRAMUSCULAR; INTRAVENOUS EVERY 6 HOURS PRN
Status: DISCONTINUED | OUTPATIENT
Start: 2020-02-11 | End: 2020-02-12 | Stop reason: HOSPADM

## 2020-02-11 RX ORDER — SODIUM CHLORIDE, SODIUM LACTATE, POTASSIUM CHLORIDE, CALCIUM CHLORIDE 600; 310; 30; 20 MG/100ML; MG/100ML; MG/100ML; MG/100ML
9 INJECTION, SOLUTION INTRAVENOUS CONTINUOUS
Status: DISCONTINUED | OUTPATIENT
Start: 2020-02-11 | End: 2020-02-12 | Stop reason: HOSPADM

## 2020-02-11 RX ORDER — OXYCODONE HYDROCHLORIDE 5 MG/1
5 TABLET ORAL ONCE
Status: COMPLETED | OUTPATIENT
Start: 2020-02-11 | End: 2020-02-11

## 2020-02-11 RX ORDER — NALOXONE HCL 0.4 MG/ML
0.2 VIAL (ML) INJECTION AS NEEDED
Status: DISCONTINUED | OUTPATIENT
Start: 2020-02-11 | End: 2020-02-11 | Stop reason: HOSPADM

## 2020-02-11 RX ADMIN — SERTRALINE 50 MG: 50 TABLET, FILM COATED ORAL at 16:55

## 2020-02-11 RX ADMIN — FAMOTIDINE 40 MG: 20 TABLET, FILM COATED ORAL at 15:13

## 2020-02-11 RX ADMIN — VANCOMYCIN HYDROCHLORIDE 1500 MG: 10 INJECTION, POWDER, LYOPHILIZED, FOR SOLUTION INTRAVENOUS at 08:43

## 2020-02-11 RX ADMIN — HYDROMORPHONE HYDROCHLORIDE 0.5 MG: 2 INJECTION, SOLUTION INTRAMUSCULAR; INTRAVENOUS; SUBCUTANEOUS at 10:30

## 2020-02-11 RX ADMIN — HYDROMORPHONE HYDROCHLORIDE 0.5 MG: 1 INJECTION, SOLUTION INTRAMUSCULAR; INTRAVENOUS; SUBCUTANEOUS at 12:08

## 2020-02-11 RX ADMIN — DEXAMETHASONE SODIUM PHOSPHATE 8 MG: 4 INJECTION INTRA-ARTICULAR; INTRALESIONAL; INTRAMUSCULAR; INTRAVENOUS; SOFT TISSUE at 10:12

## 2020-02-11 RX ADMIN — FENTANYL CITRATE 50 MCG: 50 INJECTION INTRAMUSCULAR; INTRAVENOUS at 11:50

## 2020-02-11 RX ADMIN — PROPOFOL 200 MG: 10 INJECTION, EMULSION INTRAVENOUS at 09:55

## 2020-02-11 RX ADMIN — METFORMIN HYDROCHLORIDE 500 MG: 500 TABLET ORAL at 20:01

## 2020-02-11 RX ADMIN — LIDOCAINE HYDROCHLORIDE 80 MG: 20 INJECTION, SOLUTION INFILTRATION; PERINEURAL at 09:55

## 2020-02-11 RX ADMIN — HYDROCODONE BITARTRATE AND ACETAMINOPHEN 2 TABLET: 5; 325 TABLET ORAL at 15:40

## 2020-02-11 RX ADMIN — HYDROCODONE BITARTRATE AND ACETAMINOPHEN 1 TABLET: 7.5; 325 TABLET ORAL at 13:35

## 2020-02-11 RX ADMIN — ONDANSETRON HYDROCHLORIDE 4 MG: 2 SOLUTION INTRAMUSCULAR; INTRAVENOUS at 10:12

## 2020-02-11 RX ADMIN — GABAPENTIN 300 MG: 300 CAPSULE ORAL at 20:02

## 2020-02-11 RX ADMIN — SENNOSIDES AND DOCUSATE SODIUM 2 TABLET: 8.6; 5 TABLET ORAL at 20:01

## 2020-02-11 RX ADMIN — SODIUM CHLORIDE, POTASSIUM CHLORIDE, SODIUM LACTATE AND CALCIUM CHLORIDE 9 ML/HR: 600; 310; 30; 20 INJECTION, SOLUTION INTRAVENOUS at 09:16

## 2020-02-11 RX ADMIN — MOMETASONE FUROATE AND FORMOTEROL FUMARATE DIHYDRATE 1 PUFF: 200; 5 AEROSOL RESPIRATORY (INHALATION) at 21:42

## 2020-02-11 RX ADMIN — FENTANYL CITRATE 50 MCG: 50 INJECTION INTRAMUSCULAR; INTRAVENOUS at 10:20

## 2020-02-11 RX ADMIN — FENTANYL CITRATE 50 MCG: 50 INJECTION INTRAMUSCULAR; INTRAVENOUS at 11:39

## 2020-02-11 RX ADMIN — GLYCOPYRROLATE 0.4 MG: 0.2 INJECTION INTRAMUSCULAR; INTRAVENOUS at 11:01

## 2020-02-11 RX ADMIN — FENTANYL CITRATE 50 MCG: 50 INJECTION INTRAMUSCULAR; INTRAVENOUS at 10:12

## 2020-02-11 RX ADMIN — HYDROCODONE BITARTRATE AND ACETAMINOPHEN 2 TABLET: 5; 325 TABLET ORAL at 20:00

## 2020-02-11 RX ADMIN — ACETAMINOPHEN 1000 MG: 500 TABLET, FILM COATED ORAL at 08:43

## 2020-02-11 RX ADMIN — OXYCODONE HYDROCHLORIDE 5 MG: 5 TABLET ORAL at 08:46

## 2020-02-11 RX ADMIN — NEOSTIGMINE METHYLSULFATE 4 MG: 5 INJECTION, SOLUTION INTRAMUSCULAR; INTRAVENOUS; SUBCUTANEOUS at 11:01

## 2020-02-11 RX ADMIN — GABAPENTIN 300 MG: 300 CAPSULE ORAL at 15:13

## 2020-02-11 RX ADMIN — SODIUM CHLORIDE, POTASSIUM CHLORIDE, SODIUM LACTATE AND CALCIUM CHLORIDE: 600; 310; 30; 20 INJECTION, SOLUTION INTRAVENOUS at 10:56

## 2020-02-11 RX ADMIN — HYDROMORPHONE HYDROCHLORIDE 0.5 MG: 2 INJECTION, SOLUTION INTRAMUSCULAR; INTRAVENOUS; SUBCUTANEOUS at 10:20

## 2020-02-11 RX ADMIN — ACETAMINOPHEN 1000 MG: 500 TABLET, FILM COATED ORAL at 15:13

## 2020-02-11 RX ADMIN — FENTANYL CITRATE 50 MCG: 50 INJECTION INTRAMUSCULAR; INTRAVENOUS at 09:55

## 2020-02-11 RX ADMIN — HYDROMORPHONE HYDROCHLORIDE 0.5 MG: 1 INJECTION, SOLUTION INTRAMUSCULAR; INTRAVENOUS; SUBCUTANEOUS at 12:34

## 2020-02-11 RX ADMIN — ASPIRIN 81 MG: 81 TABLET, COATED ORAL at 16:55

## 2020-02-11 RX ADMIN — TRANEXAMIC ACID 833 MG: 100 INJECTION, SOLUTION INTRAVENOUS at 10:12

## 2020-02-11 RX ADMIN — CEFAZOLIN SODIUM 2 G: 10 INJECTION, POWDER, FOR SOLUTION INTRAVENOUS at 16:55

## 2020-02-11 RX ADMIN — FENTANYL CITRATE 50 MCG: 50 INJECTION INTRAMUSCULAR; INTRAVENOUS at 10:00

## 2020-02-11 RX ADMIN — HYDROMORPHONE HYDROCHLORIDE 0.5 MG: 1 INJECTION, SOLUTION INTRAMUSCULAR; INTRAVENOUS; SUBCUTANEOUS at 13:29

## 2020-02-11 RX ADMIN — CEFAZOLIN SODIUM 2 G: 2 INJECTION, SOLUTION INTRAVENOUS at 10:00

## 2020-02-11 RX ADMIN — CELECOXIB 200 MG: 200 CAPSULE ORAL at 08:43

## 2020-02-11 RX ADMIN — FAMOTIDINE 20 MG: 10 INJECTION INTRAVENOUS at 09:00

## 2020-02-11 RX ADMIN — ROCURONIUM BROMIDE 50 MG: 10 INJECTION, SOLUTION INTRAVENOUS at 09:55

## 2020-02-11 RX ADMIN — MELOXICAM 15 MG: 15 TABLET ORAL at 15:13

## 2020-02-11 NOTE — THERAPY EVALUATION
Patient Name: Ava Lagos  : 1955    MRN: [id number]                              Today's Date: 2020       Admit Date: 2020    Visit Dx: No diagnosis found.  Patient Active Problem List   Diagnosis   • Total knee replacement status     Past Medical History:   Diagnosis Date   • Arthritis    • COPD (chronic obstructive pulmonary disease) (CMS/[facility])    • Diabetes mellitus (CMS/[facility])    • Diverticulitis    • Heart murmur    • Neck pain    • Right knee pain      Past Surgical History:   Procedure Laterality Date   • BUNIONECTOMY Bilateral    •  SECTION     • COLONOSCOPY     • GALLBLADDER SURGERY     • KNEE ARTHROSCOPY Right    • OVARIAN CYST SURGERY     • UMBILICAL HERNIA REPAIR       General Information     Row Name 20 1544          PT Evaluation Time/Intention    Document Type  evaluation  -EE     Mode of Treatment  individual therapy;physical therapy  -EE     Row Name 20 1544          General Information    Patient Profile Reviewed?  yes  -EE     Prior Level of Function  independent:;all household mobility;community mobility has rwx at home  -EE     Existing Precautions/Restrictions  fall  -EE     Row Name 20 1544          Relationship/Environment    Lives With  spouse  -EE     Row Name 20 1544          Resource/Environmental Concerns    Current Living Arrangements  home/apartment/condo  -EE     Row Name 20 1544          Home Main Entrance    Number of Stairs, Main Entrance  five  -EE     Stair Railings, Main Entrance  railings on both sides of stairs  -EE     Row Name 20 1544          Cognitive Assessment/Intervention- PT/OT    Orientation Status (Cognition)  oriented x 4  -EE     Row Name 20 1544          Safety Issues, Functional Mobility    Impairments Affecting Function (Mobility)  pain;strength;range of motion (ROM)  -EE       User Key  (r) = Recorded By, (t) = Taken By, (c) = Cosigned By    Initials Name Provider Type    EE Genny Bush,  PT Physical Therapist        Mobility     Row Name 02/11/20 1545          Bed Mobility Assessment/Treatment    Bed Mobility Assessment/Treatment  supine-sit  -EE     Supine-Sit [place] (Bed Mobility)  conditional independence  -EE     Assistive Device (Bed Mobility)  bed rails;head of bed elevated  -EE     Row Name 02/11/20 1545          Sit-Stand Transfer    Sit-Stand [place] (Transfers)  contact guard;verbal cues  -EE     Assistive Device (Sit-Stand Transfers)  walker, front-wheeled  -EE     Row Name 02/11/20 1545          Gait/Stairs Assessment/Training    [place] Level (Gait)  contact guard;verbal cues  -EE     Assistive Device (Gait)  walker, front-wheeled  -EE     Distance in Feet (Gait)  25  -EE     Pattern (Gait)  step-to  -EE     Deviations/Abnormal Patterns (Gait)  kat decreased;right sided deviations;antalgic  -EE     Bilateral Gait Deviations  forward flexed posture  -EE     Right Sided Gait Deviations  heel strike decreased  -EE     Comment (Gait/Stairs)  vc's required for gait sequencing with rwx; pt reporting sensation of R LE numbness but demonstrates no episode of R knee buckling during ambulation  -EE       User Key  (r) = Recorded By, (t) = Taken By, (c) = Cosigned By    Initials Name Provider Type    EE Genny Bush, KEE Physical Therapist        Obj/Interventions     Row Name 02/11/20 1546          General ROM    GENERAL ROM COMMENTS  R knee flexion ROM limited ~30%; all other LE ROM WFL  -EE     Row Name 02/11/20 1546          MMT (Manual Muscle Testing)    General MMT Comments  R quad 3-/5; all other LE strength grossly WFL for age  -EE     Row Name 02/11/20 1546          Therapeutic Exercise    Sets/Reps (Therapeutic Exercise)  x10 reps TKA protocol; min A required for SLR  -EE     Row Name 02/11/20 1546          Static Sitting Balance    Level of [place] (Unsupported Sitting, Static Balance)  independent  -EE     Sitting Position (Unsupported Sitting, Static Balance)   sitting on edge of bed  -EE     [facility] Name 02/11/20 1546          Static Standing Balance    Level of [place] (Supported Standing, Static Balance)  contact guard assist  -EE     Assistive Device Utilized (Supported Standing, Static Balance)  walker, rolling  -EE     [facility] Name 02/11/20 1546          Sensory Assessment/Intervention    Sensory General Assessment  -- pt reporting sensation of R LE numbness  -EE       User Key  (r) = Recorded By, (t) = Taken By, (c) = Cosigned By    Initials Name Provider Type    EE Genny Bush, PT Physical Therapist        Goals/Plan     Row Name 02/11/20 1549          Transfer Goal 1 (PT)    Activity/Assistive Device (Transfer Goal 1, PT)  sit-to-stand/stand-to-sit;walker, rolling  -EE     [place] Level/Cues Needed (Transfer Goal 1, PT)  supervision required  -EE     Time Frame (Transfer Goal 1, PT)  2 days  -EE     Progress/Outcome (Transfer Goal 1, PT)  goal ongoing  -EE     Row Name 02/11/20 1549          Gait Training Goal 1 (PT)    Activity/Assistive Device (Gait Training Goal 1, PT)  gait (walking locomotion);assistive device use  -EE     [place] Level (Gait Training Goal 1, PT)  standby assist  -EE     Distance (Gait Goal 1, PT)  120  -EE     Time Frame (Gait Training Goal 1, PT)  2 days  -EE     Progress/Outcome (Gait Training Goal 1, PT)  goal ongoing  -EE     Row Name 02/11/20 1549          ROM Goal 1 (PT)    ROM Goal 1 (PT)  R knee AROM 0-90  -EE     Time Frame (ROM Goal 1, PT)  2 days  -EE     Progress/Outcome (ROM Goal 1, PT)  goal ongoing  -EE     Row Name 02/11/20 1549          Stairs Goal 1 (PT)    Activity/Assistive Device (Stairs Goal 1, PT)  stairs, all skills  -EE     [place] Level/Cues Needed (Stairs Goal 1, PT)  contact guard assist  -EE     Number of Stairs (Stairs Goal 1, PT)  4  -EE     Time Frame (Stairs Goal 1, PT)  2 days  -EE     Progress/Outcome (Stairs Goal 1, PT)  goal ongoing  -EE     Row Name 02/11/20 1549          Patient Education  Goal (PT)    Activity (Patient Education Goal, PT)  TKA HEP  -EE     [place]/Cues/Accuracy (Memory Goal 2, PT)  demonstrates adequately;verbalizes understanding  -EE     Time Frame (Patient Education Goal, PT)  2 days  -EE     Progress/Outcome (Patient Education Goal, PT)  goal ongoing  -EE       User Key  (r) = Recorded By, (t) = Taken By, (c) = Cosigned By    Initials Name Provider Type    EE Genny Bush, PT Physical Therapist        Clinical Impression     Row Name 02/11/20 1546          Pain Assessment    Additional Documentation  Pain Scale: Numbers Pre/Post-Treatment (Group)  -EE     Row Name 02/11/20 1546          Pain Scale: Numbers Pre/Post-Treatment    Pain Scale: Numbers, Pretreatment  4/10  -EE     Pain Scale: Numbers, Post-Treatment  4/10  -EE     Pain Location - Side  Right  -EE     Pain Location - Orientation  incisional  -EE     Pain Location  knee  -EE     Pain Intervention(s)  Repositioned;Cold applied;Medication (See MAR);Elevated;Rest  -EE     Row Name 02/11/20 1546          Plan of Care Review    Plan of Care Reviewed With  patient;spouse  -EE     Outcome Summary  Pt is a 63 yo female who presents s/p R TKA demonstrating post op pain, impaired R knee ROM, R LE weakness, and impaired gait mechanics limiting independence with functional mobility. Pt was independent with all mobility prior to admission. Currently requiring CGA due to above impairments. Pt would benefit from continued PT to address impairments, improve safety, and increase independence with functional mobility. Pt has walker for use at home when discharged. Plans to DC home with spouse to assist and continue with HH PT; no problems anticipated. Will plan to reinforce gait training and assess stair negotiation tomorrow morning prior to DC.   -EE     Row Name 02/11/20 1546          Physical Therapy Clinical Impression    Criteria for Skilled Interventions Met (PT Clinical Impression)  yes;treatment indicated  -EE     Rehab  Potential (PT Clinical Summary)  good, to achieve stated therapy goals  -EE     Row Name 02/11/20 1546          Vital Signs    Pre SpO2 (%)  93  -EE     O2 Delivery Pre Treatment  supplemental O2  -EE     Post SpO2 (%)  92  -EE     O2 Delivery Post Treatment  supplemental O2  -EE     Pre Patient Position  Supine  -EE     Intra Patient Position  Standing  -EE     Post Patient Position  Sitting  -EE     Row Name 02/11/20 1546          Positioning and Restraints    Pre-Treatment Position  in bed  -EE     Post Treatment Position  chair  -EE     In Chair  reclined;call light within reach;encouraged to call for assist;exit alarm on;notified nsg;legs elevated;with family/caregiver  -EE       User Key  (r) = Recorded By, (t) = Taken By, (c) = Cosigned By    Initials Name Provider Type    Genny Gonzales, KEE Physical Therapist        Outcome Measures     Row Name 02/11/20 1549          How much help from another person do you currently need...    Turning from your back to your side while in flat bed without using bedrails?  4  -EE     Moving from lying on back to sitting on the side of a flat bed without bedrails?  4  -EE     Moving to and from a bed to a chair (including a wheelchair)?  3  -EE     Standing up from a chair using your arms (e.g., wheelchair, bedside chair)?  3  -EE     Climbing 3-5 steps with a railing?  2  -EE     To walk in hospital room?  3  -EE     AM-PAC 6 Clicks Score (PT)  19  -EE     Row Name 02/11/20 1549          Functional Assessment    Outcome Measure Options  AM-PAC 6 Clicks Basic Mobility (PT)  -EE       User Key  (r) = Recorded By, (t) = Taken By, (c) = Cosigned By    Initials Name Provider Type    Genny Gonzales, KEE Physical Therapist          PT Recommendation and Plan  Planned Therapy Interventions (PT Eval): balance training, gait training, bed mobility training, home exercise program, patient/family education, ROM (range of motion), strengthening, stretching, stair training, transfer  training  Outcome Summary/Treatment Plan (PT)  Anticipated Discharge Disposition (PT): home with assist, home with home health  Plan of Care Reviewed With: patient, spouse  Outcome Summary: Pt is a 63 yo female who presents s/p R TKA demonstrating post op pain, impaired R knee ROM, R LE weakness, and impaired gait mechanics limiting independence with functional mobility. Pt was independent with all mobility prior to admission. Currently requiring CGA due to above impairments. Pt would benefit from continued PT to address impairments, improve safety, and increase independence with functional mobility. Pt has walker for use at home when discharged. Plans to DC home with spouse to assist and continue with HH PT; no problems anticipated. Will plan to reinforce gait training and assess stair negotiation tomorrow morning prior to DC.      Time Calculation:   PT Charges     Row Name 02/11/20 1550             Time Calculation    Start Time  1520  -EE      Stop Time  1543  -EE      Time Calculation (min)  23 min  -EE      PT Received On  02/11/20  -EE      PT - Next Appointment  02/12/20  -EE      PT Goal Re-Cert Due Date  02/13/20  -EE         Time Calculation- PT    Total Timed Code Minutes- PT  11 minute(s)  -EE        User Key  (r) = Recorded By, (t) = Taken By, (c) = Cosigned By    Initials Name Provider Type    EE Genny Bush, PT Physical Therapist        Therapy Charges for Today     Code Description Service Date Service Provider Modifiers Qty    79396467034 HC PT THER PROC EA 15 MIN 2/11/2020 Genny Bush, PT GP 1    05612484494 HC PT EVAL LOW COMPLEXITY 2 2/11/2020 Genny Bush, PT GP 1    48351763031 HC PT THER SUPP EA 15 MIN 2/11/2020 Genny Bush, PT GP 1          PT G-Codes  Outcome Measure Options: AM-PAC 6 Clicks Basic Mobility (PT)  AM-PAC 6 Clicks Score (PT): 19    Genny Bush PT  2/11/2020

## 2020-02-11 NOTE — ANESTHESIA PREPROCEDURE EVALUATION
Anesthesia Evaluation     Patient summary reviewed and Nursing notes reviewed                Airway   Mallampati: III  Possible difficult intubation  Dental      Pulmonary    (+) a smoker Former, COPD,   Cardiovascular     ECG reviewed  Rhythm: regular  Rate: normal    (+) valvular problems/murmurs murmur,       Neuro/Psych- negative ROS  GI/Hepatic/Renal/Endo    (+) obesity,   diabetes mellitus,     Musculoskeletal     (+) neck pain,   Abdominal    Substance History - negative use     OB/GYN negative ob/gyn ROS         Other   arthritis,                    Anesthesia Plan    ASA 3     general   (EKG from Jan 2020 in Dr Carter's office)  intravenous induction     Anesthetic plan, all risks, benefits, and alternatives have been provided, discussed and informed consent has been obtained with: patient.

## 2020-02-11 NOTE — ANESTHESIA POSTPROCEDURE EVALUATION
Patient: Ava Lagos    Procedure Summary     Date:  02/11/20 Room / Location:  [facility] OR [address] [place] [address] MAIN OR    Anesthesia Start:  0950 Anesthesia Stop:  1128    Procedure:  TOTAL KNEE ARTHROPLASTY (Right Knee) Diagnosis:      Surgeon:  Robbin Jennings II, MD Provider:  Bradly Yañez MD    Anesthesia Type:  general ASA Status:  3          Anesthesia Type: general    Vitals  Vitals Value Taken Time   /100 2/11/2020  1:30 PM   Temp 36.3 °C (97.4 °F) 2/11/2020 11:24 AM   Pulse 87 2/11/2020  1:43 PM   Resp 16 2/11/2020 12:45 PM   SpO2 92 % 2/11/2020  1:43 PM   Vitals shown include unvalidated device data.        Post Anesthesia Care and Evaluation    Patient location during evaluation: PACU  Patient participation: complete - patient participated  Level of consciousness: awake and alert  Pain management: adequate  Airway patency: patent  Anesthetic complications: No anesthetic complications    Cardiovascular status: acceptable  Respiratory status: acceptable  Hydration status: acceptable    Comments: --------------------            02/11/20               1245     --------------------   BP:       124/79     Pulse:     106       Resp:       16       Temp:                SpO2:      95%      --------------------

## 2020-02-11 NOTE — OP NOTE
Total Knee Replacement Operative Note  Dr. ELISE Jennings II  [phone]    PATIENT NAME: Ava Lagos  MRN: [id number]  : 1955 AGE: 64 y.o. GENDER: female  DATE OF OPERATION: 2020  PREOPERATIVE DIAGNOSIS: End Stage Arthritis  POSTOPERATIVE DIAGNOSIS: Same  OPERATION PERFORMED: Right Total Knee Arthroplasty  SURGEON: Robbin Jennings MD  Circulator: Eunice Powell RN  Scrub Person: Mirtha Wiseman  Vendor Representative: Seymour Marti  Assistant: Ning Phelps PA  ANESTHESIA: General  ASSISTANT: Ning Phelps. This case would not have been possible without another set of skilled surgical hands for retraction, use of instrumentation, and general assistance.  This assistance was vital to the success of the case.   ESTIMATED BLOOD LOSS: 50cc  SPONGE AND NEEDLE COUNT: Correct  INDICATIONS:   A discussion of operative versus nonoperative treatment was had with the patient and they failed conservative management. They elected to undergo total knee arthroplasty. The risks of surgery were discussed and included the risk of anesthesia, infection, damage to neurovascular structures, implant loosening/failure, fracture, hardware prominence, continued pain, early failure, the need for further procedures, medical complications, and others. No guarantees were made. The patient wished to proceed with surgery and a surgical consent was signed.    COMPONENTS:   · Journey II BCS Oxinium Femoral Component: Size 6  · Journey II Tibial Baseplate: 4   · Posterior Stabilized Insert: 10  · Patella: 32mm    PERTINENT FINDINGS: Degenerative Arthritis    DETAILS OF PROCEDURE:  The patient was met in the preoperative area. The site was marked. The consent and H&P were reviewed. The patient was then wheeled back to the operative suite and transferred to the operative table. The patient underwent anesthesia. A tourniquet was placed on the upper thigh. The Eid baseplate was secured to the table. Surgical alcohol  was used to thoroughly clean the entire operative extremity. A bump was placed under the operative hip.     The leg was then prepped in the normal sterile fashion, which included ChloraPrep, multiple layers of sterile drapes, and surgical space suits for the entire operative team. The Eid boot was applied to the foot after adequate padding. An Ioban dressing was applied to the knee after the surgical incision was marked. New outer gloves were used by all sterile surgical team members after final draping. After a surgical timeout in which administration of preoperative antibiotics as well as 1g of tranexamic acid (if not contraindicated) and the surgical site were confirmed, the tourniquet was inflated.     In flexion, a midline knee incision was utilized centered on the patella and ending medial to the tibial tubercle. Dissection was carried down to the knee capsule. A midvastus ararthrotomy was completed. The patellar fat pad was excised. The MCL was minimally elevated to gain adequate exposure to the knee.      The patella was subluxed laterally and then the height was measured. The patella was held vertical using 2 clamps, and was then cut using a saw. The patella was then sized, and the lug holes were drilled. Excess patellar bone was removed using a saw. The patella was then protected during this case using the metal patella shield.    The femoral canal was breached using the reamer. The canal was thoroughly irrigated. The intramedullary femoral jig was inserted. The distal cutting block was pinned in place and held with a kocher clamp. The cut was made with an oscillating saw. With all saw cuts, the soft tissues were protected with retractors. The sizing jig was placed onto the femur and set to 3 degrees of external rotation. Rotation was checked against the epicondylar axis and Whitesides line. The femur was then sized. The size matching block was placed and secured with pins. The cuts were then made  with oscillating saw in a routine fashion. All bone cuts were removed.     The tibial canal was then breached using the entry drill. The canal was thoroughly irrigated. Next the intramedullary guide was inserted down the tibial canal. The cutting jig was aligned with the medial third of the tibial tubercle. The height of the cut was measured and the cutting jig was then secured with pins. The slope and varus/valgus positioning was checked with an extramedullary karin which was attached to the cutting jig.     The cut was then made using the oscillating saw, again ensuring that the retractors were in proper position to protect the collateral ligaments and the patellar tendon, as well as the neurovascular bundle and PCL posteriorly.     A lamina  was inserted into the notch with the knee in flexion and used to expose the posterior joint. Using a kocher and bovie the remaining medial and lateral menisci were removed. Excess posterior osteophytes were also removed with a osteotome, mallet, and rongeur as necessary.      Next a trial of the knee was performed using trial femoral and tibial trial components. Polyethylene trials were inserted as needed to gain appropriate stability. A drop karin was once again used to assess the varus/valgus alignment of the knee and the knee was noted to be in excellent alignment. Soft tissue releases were then performed as necessary to fine-tune the balancing of the knee.  After taking the knee through one final range of motion, the tibial rotation of the trial was noted.  It was decided to convert the knee to a posterior stabilized construct.  The box was prepared.  The knee was then trialed again.    We next turned our attention back to the tibia to finish the tibial preparation. The tibia was measured and sized. The tibial plate was aligned with the rotation from the trialing process and verified to be positioned near the medial third of the tibial tubercle. The tibial surface was  then prepared for the keel .    The knee was thoroughly irrigated with sterile saline using a pulse-lavage system while the final tibial baseplate, femoral component and patellar component were opened. Cement was prepared and mixed using standard techniques. Outer gloves were changed before implant handling to ensure no soft tissue or oily material was exposed to the surfaces of the final implants. The bony knee surfaces were dried and the implants were cemented in place, starting with the tibia, then the femur and finally the patella. Excess cement was removed at each step. A trial poly was utilized during cementation for compression. The tourniquet was taken down and adequate hemostasis was achieved. The knee was thoroughly irrigated once again.     The soft tissues about the knee were then injected with an anesthetic cocktail. Care was taken to avoid the peroneal nerve and the neurovascular bundle posteriorly. The cement was allowed to harden. After the cement was fully set, the knee was ranged with various thickness of polyethylene trials to achieve full extension and adequate flexion. The knee was inspected for excess cement, which was removed. The real poly, of corresponding thickness was then opened and inserted into the knee. One final range of motion and stability test showed the knee to be in good condition with a well tracking patellar component.    The knee capsule was then closed with a running barbed suture and supplemented with interrupted #1 Vicryl. 2g of tranexamic acid was then injected into the knee joint, and the knee capsule was noted to be water tight. The subcutaneous layer was closed with 2-0 Vicryl and the skin was closed with a running barbed Monocryl. A JACKIE Wound VAC was then applied    The patient was awoken from anesthesia, moved to the [facility] and taken to the recovery room in stable condition. Sponge and needle count were correct. There were no complications. Patient tolerated the  "procedure well.    R \"Dustin\" Dick ESTRADA MD  Orthopaedic Surgery  [facility]  [phone]                  "

## 2020-02-11 NOTE — PLAN OF CARE
See below.    Problem: Patient Care Overview  Goal: Plan of Care Review  Outcome: Ongoing (interventions implemented as appropriate)  Flowsheets  Taken 2/11/2020 1637 by Jose Adair RN  Progress: improving  Outcome Summary: 64/F POD#0 right TKA.  ALOx4, O2 @ 2L NC, lungs clear but diminished r/t COPD, BS hypoactive, due to void at 2130 tonight.  Up x1 BRP with walker/gait belt.  2+ pedal pulses noted bilaterally, no c/o numbness/tingling in operative extremity, JACKIE dressing CDI.  Pain well-controlled with PO pain meds only.  PIV infusing NS @ 100 cc/hr per MD orders until voiding/ABX finished.  Plans to D/C home with HH when ready  Taken 2/11/2020 1546 by Genny Bush PT  Plan of Care Reviewed With: patient;spouse

## 2020-02-11 NOTE — ANESTHESIA PROCEDURE NOTES
Airway  Urgency: elective    Date/Time: 2/11/2020 10:00 AM  Airway not difficult    General Information and Staff    Patient location during procedure: OR  CRNA: Karmen Guevara CRNA    Indications and Patient Condition  Indications for airway management: airway protection    Preoxygenated: yes  Mask difficulty assessment: 2 - vent by mask + OA or adjuvant +/- NMBA    Final Airway Details  Final airway type: endotracheal airway      Successful airway: ETT  Cuffed: yes   Successful intubation technique: direct laryngoscopy  Facilitating devices/methods: Bougie  Endotracheal tube insertion site: oral  Blade: Guerrero  Blade size: 2  ETT size (mm): 7.0  Cormack-Lehane Classification: grade IIa - partial view of glottis  Placement verified by: chest auscultation and capnometry   Cuff volume (mL): 8  Number of attempts at approach: 1  Assessment: lips, teeth, and gum same as pre-op and atraumatic intubation    Additional Comments  PreO2 with 100% O2;  FeO2 >85%;  sniff position; easy mask ventilation;  Intubated with no difficultly after eyes taped; Appears atraumatic;  Lips and teeth intact as preop condition;  Airway secured. Connected to ventilator.

## 2020-02-11 NOTE — PLAN OF CARE
Problem: Patient Care Overview  Goal: Plan of Care Review  Flowsheets (Taken 2/11/2020 7524)  Outcome Summary: Pt is a 63 yo female who presents s/p R TKA demonstrating post op pain, impaired R knee ROM, R LE weakness, and impaired gait mechanics limiting independence with functional mobility. Pt was independent with all mobility prior to admission. Currently requiring CGA due to above impairments. Pt would benefit from continued PT to address impairments, improve safety, and increase independence with functional mobility. Pt has walker for use at home when discharged. Plans to DC home with spouse to assist and continue with HH PT; no problems anticipated. Will plan to reinforce gait training and assess stair negotiation tomorrow morning prior to DC.

## 2020-02-12 VITALS
TEMPERATURE: 97.7 F | RESPIRATION RATE: 16 BRPM | HEART RATE: 107 BPM | WEIGHT: 184.08 LBS | OXYGEN SATURATION: 93 % | SYSTOLIC BLOOD PRESSURE: 120 MMHG | DIASTOLIC BLOOD PRESSURE: 70 MMHG | BODY MASS INDEX: 29.58 KG/M2 | HEIGHT: 66 IN

## 2020-02-12 LAB
ANION GAP SERPL CALCULATED.3IONS-SCNC: 12.5 MMOL/L (ref 5–15)
BUN BLD-MCNC: 12 MG/DL (ref 8–23)
BUN/CREAT SERPL: 14.5 (ref 7–25)
CALCIUM SPEC-SCNC: 8.6 MG/DL (ref 8.6–10.5)
CHLORIDE SERPL-SCNC: 101 MMOL/L (ref 98–107)
CO2 SERPL-SCNC: 24.5 MMOL/L (ref 22–29)
CREAT BLD-MCNC: 0.83 MG/DL (ref 0.57–1)
DEPRECATED RDW RBC AUTO: 38.7 FL (ref 37–54)
ERYTHROCYTE [DISTWIDTH] IN BLOOD BY AUTOMATED COUNT: 12.1 % (ref 12.3–15.4)
GFR SERPL CREATININE-BSD FRML MDRD: 84 ML/MIN/1.73
GLUCOSE BLD-MCNC: 120 MG/DL (ref 65–99)
HCT VFR BLD AUTO: 33.7 % (ref 34–46.6)
HGB BLD-MCNC: 11.4 G/DL (ref 12–15.9)
MCH RBC QN AUTO: 30.2 PG (ref 26.6–33)
MCHC RBC AUTO-ENTMCNC: 33.8 G/DL (ref 31.5–35.7)
MCV RBC AUTO: 89.2 FL (ref 79–97)
PLATELET # BLD AUTO: 257 10*3/MM3 (ref 140–450)
PMV BLD AUTO: 9.8 FL (ref 6–12)
POTASSIUM BLD-SCNC: 4.1 MMOL/L (ref 3.5–5.2)
RBC # BLD AUTO: 3.78 10*6/MM3 (ref 3.77–5.28)
SODIUM BLD-SCNC: 138 MMOL/L (ref 136–145)
WBC NRBC COR # BLD: 8.18 10*3/MM3 (ref 3.4–10.8)

## 2020-02-12 PROCEDURE — 97110 THERAPEUTIC EXERCISES: CPT

## 2020-02-12 PROCEDURE — 97150 GROUP THERAPEUTIC PROCEDURES: CPT

## 2020-02-12 PROCEDURE — 85027 COMPLETE CBC AUTOMATED: CPT | Performed by: ORTHOPAEDIC SURGERY

## 2020-02-12 PROCEDURE — 80048 BASIC METABOLIC PNL TOTAL CA: CPT | Performed by: ORTHOPAEDIC SURGERY

## 2020-02-12 PROCEDURE — 25010000002 CEFAZOLIN PER 500 MG: Performed by: ORTHOPAEDIC SURGERY

## 2020-02-12 RX ORDER — HYDROCODONE BITARTRATE AND ACETAMINOPHEN 5; 325 MG/1; MG/1
1 TABLET ORAL EVERY 4 HOURS PRN
Qty: 40 TABLET | Refills: 0 | Status: SHIPPED | OUTPATIENT
Start: 2020-02-12 | End: 2020-02-21

## 2020-02-12 RX ADMIN — SERTRALINE 50 MG: 50 TABLET, FILM COATED ORAL at 08:39

## 2020-02-12 RX ADMIN — HYDROCODONE BITARTRATE AND ACETAMINOPHEN 2 TABLET: 5; 325 TABLET ORAL at 13:13

## 2020-02-12 RX ADMIN — HYDROCODONE BITARTRATE AND ACETAMINOPHEN 2 TABLET: 5; 325 TABLET ORAL at 00:11

## 2020-02-12 RX ADMIN — ASPIRIN 81 MG: 81 TABLET, COATED ORAL at 08:39

## 2020-02-12 RX ADMIN — MELOXICAM 15 MG: 15 TABLET ORAL at 08:39

## 2020-02-12 RX ADMIN — GABAPENTIN 300 MG: 300 CAPSULE ORAL at 08:39

## 2020-02-12 RX ADMIN — HYDROCODONE BITARTRATE AND ACETAMINOPHEN 2 TABLET: 5; 325 TABLET ORAL at 04:40

## 2020-02-12 RX ADMIN — CEFAZOLIN SODIUM 2 G: 10 INJECTION, POWDER, FOR SOLUTION INTRAVENOUS at 00:12

## 2020-02-12 RX ADMIN — HYDROCODONE BITARTRATE AND ACETAMINOPHEN 2 TABLET: 5; 325 TABLET ORAL at 08:39

## 2020-02-12 RX ADMIN — FAMOTIDINE 40 MG: 20 TABLET, FILM COATED ORAL at 08:39

## 2020-02-12 NOTE — PROGRESS NOTES
Discharge Planning Assessment  [facility]     Patient Name: Ava Lagos  MRN: [id number]  Today's Date: 2/12/2020    Admit Date: 2/11/2020    Discharge Needs Assessment     Row Name 02/12/20 1445       Living Environment    Lives With  spouse    Current Living Arrangements  home/apartment/condo    Family Caregiver if Needed  spouse    Quality of Family Relationships  helpful;involved       Resource/Environmental Concerns    Home Accessibility Concerns  stairs to enter home       Transition Planning    Patient/Family Anticipates Transition to  home with family    Patient/Family Anticipated Services at Transition  home health care    Transportation Anticipated  family or friend will provide       Discharge Needs Assessment    Readmission Within the Last 30 Days  no previous admission in last 30 days    Equipment Currently Used at Home  glucometer;walker, rolling;cane, straight    Anticipated Changes Related to Illness  none    Equipment Needed After Discharge  walker, rolling;cane, straight    Discharge Facility/Level of Care Needs  home with home health    Provided post acute provider list?  N/A        Discharge Plan     Row Name 02/12/20 1448       Plan    Plan  Home w/ family and [facility]     Plan Comments  Facesheet and dc plan verified at the bedside; Pt to [place] home w/ family support. [facility]         Destination      Coordination has not been started for this encounter.      Durable Medical Equipment      Coordination has not been started for this encounter.      Dialysis/Infusion      Coordination has not been started for this encounter.      Home Medical Care      Service Provider Request Status Selected Services Address Phone Number Fax Number    [facility] Accepted N/A [address] [address] [phone]      Therapy      Coordination has not been started for this encounter.      Community Resources       Coordination has not been started for this encounter.        Expected Discharge Date and Time     Expected Discharge Date Expected Discharge Time    Feb 12, 2020         Demographic Summary    No documentation.       Functional Status     Row Name 02/12/20 1447       Functional Status    Usual Activity Tolerance  moderate    Current Activity Tolerance  fair       Functional Status, IADL    Medications  independent    Meal Preparation  independent    Housekeeping  assistive equipment    Laundry  independent    Shopping  assistive equipment       Mental Status    General Appearance WDL  WDL       Mental Status Summary    Recent Changes in Mental Status/Cognitive Functioning  no changes        Psychosocial    No documentation.       Abuse/Neglect    No documentation.       Legal    No documentation.       Substance Abuse    No documentation.       Patient Forms    No documentation.           Amelia Harley RN

## 2020-02-12 NOTE — PLAN OF CARE
See below.    Problem: Patient Care Overview  Goal: Plan of Care Review  Outcome: Outcome(s) achieved  Flowsheets (Taken 2/12/2020 1329)  Progress: improving  Plan of Care Reviewed With: patient; spouse  Outcome Summary: 64/F POD#1 right TKA.  ALOx4, RA, lungs clear but diminished, BS hypoactive but passing gas, voiding independently.  Up x1 BRP with walker/gait belt.  2+ pedal pulses noted bilaterally, no c/o numbness/tingling in operative extremity, JACKIE dressing CDI.  Pain well-controlled with PO pain meds only.  PIV removed.  D/C home with HH today.

## 2020-02-12 NOTE — DISCHARGE PLACEMENT REQUEST
"Ava Lagos (64 y.o. Female)     Date of Birth Social Security Number Address Home Phone MRN    1955  [address] [address] [phone] [id number]    [employer] Marital Status          None        Admission Date Admission Type Admitting Provider Attending Provider Department, Room/Bed    2/11/20 Elective Robbin Jennings II, MD Sweet, Richard Alexander II, MD [address], P789/1    Discharge Date Discharge Disposition Discharge Destination         Home or Self Care              Attending Provider:  Robbin Jennings II, MD    Allergies:  No Known Allergies    Isolation:  None   Infection:  None   Code Status:  CPR    Ht:  167.6 cm (66\")   Wt:  83.5 kg (184 lb 1.4 oz)    Admission Cmt:  None   Principal Problem:  None                Active Insurance as of 2/11/2020     Primary Coverage     Payor Plan Insurance Group Employer/Plan Group    [place] [id number]     Payor Plan Address Payor Plan Phone Number Payor Plan Fax Number Effective Dates    [address]   8/13/2000 - None Entered    [facility] [id number]       Subscriber Name Subscriber Birth Date Member ID       AVA LAGOS 1955 [id number]                 Emergency Contacts      (Rel.) Home Phone Work Phone Mobile Phone    Joe Teixeira (Spouse) [phone] -- [phone]              "

## 2020-02-12 NOTE — DISCHARGE SUMMARY
Orthopaedic Discharge Summary  Dr. OLIVARES “Dustin” Dick ESTRADA  [phone]    NAME: Ava Lagos PCP: Lesley Carter MD   :  MRN: [id number]  [id number] LOS:  ADMIT: 1 days  2020   AGE/SEX: 64 y.o. female DC:  today             · Admitting Diagnosis: Total knee replacement status [Z96.659]    · Surgery Performed: TOTAL KNEE ARTHROPLASTY    · Discharge Medications:         Discharge Medications      New Medications      Instructions Start Date   HYDROcodone-acetaminophen 5-325 MG per tablet  Commonly known as:  NORCO   1 tablet, Oral, Every 4 Hours PRN         Continue These Medications      Instructions Start Date   aspirin 81 MG EC tablet   81 mg, Oral, Daily, INSTRUCTED PT TO FOLLOW MD INSTRUCTIONS REGARDING HOLDING FOR SURGERY      cyclobenzaprine 10 MG tablet  Commonly known as:  FLEXERIL   10 mg, Oral, 2 Times Daily PRN      dicyclomine 20 MG tablet  Commonly known as:  BENTYL   20 mg, Oral, 3 Times Daily      gabapentin 300 MG capsule  Commonly known as:  NEURONTIN   300 mg, Oral, 3 Times Daily      metFORMIN 500 MG tablet  Commonly known as:  GLUCOPHAGE   500 mg, Oral, Nightly      mometasone-formoterol 200-5 MCG/ACT inhaler  Commonly known as:  DULERA 200   1 puff, Inhalation, 2 Times Daily      roflumilast 500 MCG tablet tablet  Commonly known as:  DALIRESP   Oral, Daily      sertraline 50 MG tablet  Commonly known as:  ZOLOFT   50 mg, Oral, Daily         Stop These Medications    CHLORHEXIDINE GLUCONATE EX     mupirocin 2 % nasal ointment  Commonly known as:  BACTROBAN            · Vitals:     Vitals:    20 1939 20 2300 20 0249 20 0727   BP: 138/75 108/66 120/70 126/65   BP Location: Left arm Left arm Left arm Left arm   Patient Position: Lying Lying Lying Sitting   Pulse: 103 110 104 (!) 122   Resp: 16 16 16 18   Temp: 97.4 °F (36.3 °C) 97.2 °F (36.2 °C) 97.2 °F (36.2 °C) 97.7 °F (36.5 °C)   TempSrc: Oral Oral Oral Oral   SpO2: 97% 95% 95% 90%   Weight:       Height:                 · Labs:      Admission on 02/11/2020   Component Date Value Ref Range Status   • Glucose 02/11/2020 102  70 - 130 mg/dL Final   • Glucose 02/11/2020 152* 70 - 130 mg/dL Final   • Glucose 02/12/2020 120* 65 - 99 mg/dL Final   • BUN 02/12/2020 12  8 - 23 mg/dL Final   • Creatinine 02/12/2020 0.83  0.57 - 1.00 mg/dL Final   • Sodium 02/12/2020 138  136 - 145 mmol/L Final   • Potassium 02/12/2020 4.1  3.5 - 5.2 mmol/L Final   • Chloride 02/12/2020 101  98 - 107 mmol/L Final   • CO2 02/12/2020 24.5  22.0 - 29.0 mmol/L Final   • Calcium 02/12/2020 8.6  8.6 - 10.5 mg/dL Final   • eGFR  African Amer 02/12/2020 84  >60 mL/min/1.73 Final   • BUN/Creatinine Ratio 02/12/2020 14.5  7.0 - 25.0 Final   • Anion Gap 02/12/2020 12.5  5.0 - 15.0 mmol/L Final   • WBC 02/12/2020 8.18  3.40 - 10.80 10*3/mm3 Final   • RBC 02/12/2020 3.78  3.77 - 5.28 10*6/mm3 Final   • Hemoglobin 02/12/2020 11.4* 12.0 - 15.9 g/dL Final   • Hematocrit 02/12/2020 33.7* 34.0 - 46.6 % Final   • MCV 02/12/2020 89.2  79.0 - 97.0 fL Final   • MCH 02/12/2020 30.2  26.6 - 33.0 pg Final   • MCHC 02/12/2020 33.8  31.5 - 35.7 g/dL Final   • RDW 02/12/2020 12.1* 12.3 - 15.4 % Final   • RDW-SD 02/12/2020 38.7  37.0 - 54.0 fl Final   • MPV 02/12/2020 9.8  6.0 - 12.0 fL Final   • Platelets 02/12/2020 257  140 - 450 10*3/mm3 Final        No results found.    · Hospital Course:   64 y.o. female was admitted to [facility] to services of Robbin Jennings II, MD with Total knee replacement status [Z96.659] on 2/11/2020 and underwent TOTAL KNEE ARTHROPLASTY. Postoperative DVT ppx was accomplished with Restarting her home aspirin starting on POD#1 and will be continued for the duration of the PCP/Cardiologists discretion. The patient will follow up with them to arrange DVT prophylaxis postop. . Post-operatively the patient transferred to the floor where the patient underwent mobilization therapy. Opioids were titrated to achieve appropriate pain management to  "allow for participation in mobilization exercises. Vital signs and laboratory values are now within safe parameters for discharge. The dressings and/or incision is intact without signs or symptoms of acute infection. Operative extremity neurovascular status remains intact as compared to the preoperative exam. Appropriate education re: incision care, activity levels, medications, and follow up visits was completed and all questions were answered. The patient is now deemed stable for discharge.    HOME: The patient progressed well with physical therapy. There were cleared for discharge to home. The patietn was sent home in good condition}.       R \"Dustin\" Dick ESTRADA MD  Orthopaedic Surgery  [facility]  [phone]                                               "

## 2020-02-12 NOTE — PLAN OF CARE
Problem: Patient Care Overview  Goal: Plan of Care Review  Flowsheets (Taken 2/12/2020 1310)  Progress: improving  Plan of Care Reviewed With: patient  Note:   Patient ambulated 80' SV with a RW and tolerated progression of exercise protocol. Stair training completed with no issues. From PT standpoint, okay to [place] home today with HH PT to follow up.

## 2020-02-12 NOTE — PAYOR COMM NOTE
"DISCHARGED    REF #[id number]           Ava Lagos (64 y.o. Female)     Date of Birth Social Security Number Address Home Phone MRN    1955  [address] [address] [phone] [id number]    [employer] Marital Status          None        Admission Date Admission Type Admitting Provider Attending Provider Department, Room/Bed    2/11/20 Elective Robbin Jennings II, MD  [address], P789/1    Discharge Date Discharge Disposition Discharge Destination        2/12/2020 Home or Self Care              Attending Provider:  (none)   Allergies:  No Known Allergies    Isolation:  None   Infection:  None   Code Status:  CPR    Ht:  167.6 cm (66\")   Wt:  83.5 kg (184 lb 1.4 oz)    Admission Cmt:  None   Principal Problem:  None                Active Insurance as of 2/11/2020     Primary Coverage     Payor Plan Insurance Group Employer/Plan Group    [address] [place] [id number]     Payor Plan Address Payor Plan Phone Number Payor Plan Fax Number Effective Dates    [address]   8/13/2000 - None Entered    [facility] [id number]       Subscriber Name Subscriber Birth Date Member ID       AVA LAGOS 1955 [id number]                 Emergency Contacts      (Rel.) Home Phone Work Phone Mobile Phone    Joe Teixeira (Spouse) [phone] -- [phone]          "

## 2020-02-12 NOTE — PROGRESS NOTES
Orthopaedic Surgery   Daily Progress Note  Dr. ELISE Jennings II  [phone]  DEMOGRAPHICS:   · Ava Lagos   · Age:64 y.o.   · MRN:[id number]  · Admitted: 2/11/2020    PROCEDURE: 1 Day Post-Op s/p Procedure(s):  TOTAL KNEE ARTHROPLASTY     HOSPITAL PROGRESS  · Patient Issues: No major issues, pain well controlled.  · Ambulation/Activity: Ambulating well with PT/Nursing.      VITALS:  Vitals:    02/11/20 1939 02/11/20 2300 02/12/20 0249 02/12/20 0727   BP: 138/75 108/66 120/70 126/65   BP Location: Left arm Left arm Left arm Left arm   Patient Position: Lying Lying Lying Sitting   Pulse: 103 110 104 (!) 122   Resp: 16 16 16 18   Temp: 97.4 °F (36.3 °C) 97.2 °F (36.2 °C) 97.2 °F (36.2 °C) 97.7 °F (36.5 °C)   TempSrc: Oral Oral Oral Oral   SpO2: 97% 95% 95% 90%   Weight:       Height:           PHYSICAL EXAM:  · LUNGS: Equal chest rise, no shortness of air  · CARDIOVASCULAR: brisk capillary refill intact  · WOUND: JACKIE Wound Vac System working in good condition, minimal drainage  · EXTREMITY: Operative Leg  · Pulses: brisk capillary refill intact  · Sensation: Sensation intact to light touch to the saphenous/sural/tibial/deep peroneal/superficial peroneal nerves, and grossly throughout the extremity.  · Motor: 5/5 EHL/FHL/TA/GS motor complexes    LABS:   Lab Results   Component Value Date    HGB 11.4 (L) 02/12/2020     Lab Results   Component Value Date    WBC 8.18 02/12/2020     Lab Results   Component Value Date    GLUCOSE 120 (H) 02/12/2020    CALCIUM 8.6 02/12/2020     02/12/2020    K 4.1 02/12/2020    CO2 24.5 02/12/2020     02/12/2020    BUN 12 02/12/2020    CREATININE 0.83 02/12/2020    EGFRIFAFRI 84 02/12/2020    BCR 14.5 02/12/2020    ANIONGAP 12.5 02/12/2020       ASSESSMENT: Patient is a 64 y.o. female who is 1 Day Post-Op s/p Procedure(s):  TOTAL KNEE ARTHROPLASTY     PLAN:   · Weight Bearing: Weight Bearing As Tolerated  · Labs: Above lab values review. Plan: No intervention  "necessary at this time.   · PT/OT: To Mobilize  · DVT PPX: Resume home ASA  · Post-Op Xray: TKA implants in good alignment.   · Follow-Up: In office at 3 weeks postop  · Dispo: Home today    R \"Dustin\" Dick ESTRADA MD  Orthopaedic Surgery  [facility]  [phone] - [place] Office  [phone] - [place] Office      "

## 2020-02-12 NOTE — DISCHARGE INSTRUCTIONS
Total Knee  Discharge Instructions  Dr. ELISE Tao” Stratham II  [phone]    • INCISION CARE  o Wash your hands prior to dressing changes  o JACKIE Wound VAC: Postoperatively you had a JACKIE Wound Vac placed on the incision. This was placed under sterile conditions in the operating room. It remains in place for 7 days postoperatively. After 7 days, the entire dressing must be removed, including all of the sticky adhesive. The dressing and battery pack provide gentle suction to the incision and provide several benefits over a traditional dressing:  - It maintains the sterile environment of the OR and reduces the risk of infection  - The suction removes unwanted buildup of blood/hematoma under the skin to reduce swelling  - The suction also promotes fresh blood supply to the skin and soft tissue to speed up healing  - The postoperative scar is reduced in size  - Showering is permitted immediately after surgery, but the battery pack must be protected or removed during the shower.   o After 7 days the JACKIE Wound Vac is removed. If there is no drainage, no dressing is required. If there is some scant drainage a dry bandage can be applied and changed daily until seen in the office or until the drainage stops.   o No creams or ointments to the incisions until 4 weeks post op.  o Do not touch or pick at the incision  o Check incision every day and notify surgeon immediately if any of the following signs or symptoms are seen:  - Increase in redness  - Increase in swelling around the incision and of the entire extremity  - Increase in pain  - NEW drainage or oozing from the incision  - Pulling apart of the edges of the incision  - Increase in overall body temperature (greater than 100.4 degrees)  o Zip-Line: your incision was closed with a state of the art device.   - Is a non-invasive and easy to use wound closure device that replaces sutures, staples and glue for surgical incisions  - It minimizes scarring and eliminating  “railroad” marks that come with staples or sutures  - It makes removal as atraumatic as peeling off a bandage  - Can be removed at home or by a physical therapist or nurse at 10-14 days postoperatively    • ACTIVITIES  o Exercises:  - Physical therapy will begin immediately while in the hospital. Patients going to a nursing home will get therapy as part of their care at the SNU/SNF facility. Patients going home may also have a therapist come to the house to help them mobilize until they can safely get to an outpatient therapy facility.  - Elevate the affected leg most of the day during the first week post operatively. Caution must be taken to avoid pillow placement directly under the heel of the leg, as this can cause pressure ulcers even with a soft pillow. All pillows and blankets should be placed underneath of the thigh and calf so that the heel is free-floating.  - Use cold packs for 20-30 minutes approximately 5 times per day.  - You should perform the daily stretching and strengthening exercises as taught by the therapist as often as possible. This can be done many times a day.  - Full weight bearing is allowed after surgery. It will be sore/painful to put weight on the leg, but this will help the bone to heal and prevent complications such as pneumonia, bed sores and blood clots. Mobilization is vital to the recovery process.  o Activities of Daily Living:  - No tub baths, hot tubs, or swimming pools for 4 weeks.  - May shower and let water run over the incision immediately after surgery. The battery pack of the JACKIE Wound Vac must be protected or removed while in the shower. After the JACKIE is removed 7 days after surgery showering is permitted as long as there is no drainage from the wound.     • Restrictions  o Weight: It is ok to allow full weight bearing after surgery. Weight on the leg actually quickens the recovery process. While it will be sore/painful to put weight on the leg, it is safe to do so. Hip  replacement after hip fracture has a much slower recover process. It can take months to heal fully from a hip fracture and patients even make some slow benefits up to a year afterwards.   o Driving: Many patients have questions about when it is safe to return to driving. The answer is that this is extremely variable. It depends on the extent of the surgery, as well as how quickly you heal. Certainly left leg surgeries make returning to driving easier while right leg surgeries require more extensive rehabilitation before driving can be safe. Until you can press down on the brake hard, and are off of all narcotics, driving is not permitted. Your surgeon cannot “clear” you to return to driving, only you can make the decision when you feel it is safe.    • Medications  o Anticoagulants: After upper extremity surgery most patients do not require an anticoagulant unless you have another injury that will be keeping you from mobilizing. Lower extremity surgery typically does require use of an anticoagulation medicine.   - IF YOU HAD LOWER EXTREMITY SURGERY AND ARE NOT DISCHARGED HOME WITH ANY ANTICOAGULANT MEDICINE YOU SHOULD TAKE ASPIRIN 325mg DAILY FOR 30 DAYS POSTOPERATIVELY.  - If you are discharged home with an anticoagulant such as Aspirin, Xarelto, Eliquis, Coumadin, or Lovenox, follow these simple instructions:   - Notify surgeon immediately if any peña bleeding is noted in the urine, stool, emesis, or from the nose or the incision. Blood in the stool will often appear as black rather than red. Blood in urine may appear as pink. Blood in emesis may appear as brown/black like coffee grounds.  - You will need to apply pressure for longer periods of time to any cuts or abrasions to stop bleeding  - Avoid alcohol while taking anticoagulants  - Most anticoagulants are to be taken for 30 days postoperatively. After this time, you may stop using them unless instructed otherwise.   - If you were already taking an  anticoagulant (commonly Aspirin, Coumadin, or Plavix) you will likely be resuming your normal dose postoperatively and will be continuing that medication at the discretion of the prescribing physician.  o Stool Softeners: You will be at greater risk of constipation after surgery due to being less mobile and the pain medications.  - Take stool softeners as needed. Over the counter Colace 100 mg 1-2 capsules twice daily can be taken.  - If stools become too loose or too frequent, please decreases the dosage or stop the stool softener.  - If constipation occurs despite use of stool softeners, you are to continue the stool softeners and add a laxative (Milk of Magnesia 1 ounce daily as needed)  - Drink plenty of fluids, and eat fruits and vegetables during your recovery time. Getting up and mobilizing will help the bowels to recover their regular function, as will weaning off of all narcotics when the pain becomes tolerable.  o Pain Medications: Utilized after surgery are narcotics. This is some general information about these medications.  - CLASSIFICATION: Pain medications are called Opioids and are narcotics  - LEGALITIES: It is illegal to share narcotics with others  - DRIVING: it is illegal to drive while under the influence of narcotics. Doing so is a DUI.  - POTENTIAL SIDE EFFECTS: nausea, vomiting, itching, dizziness, drowsiness, dry mouth, constipation, and difficulty urinating.  - POTENTIAL ADVERSE EFFECTS:  - Opioid tolerance can develop with use of pain medications and this simply means that it requires more and more of the medication to control pain. However, this is seen more in patients that use opioids for longer periods of time.  - Opioid dependence can develop with use of Opioids. People with opioid dependence will experience withdrawal symptoms upon cessation of the medication.  - Opioid addiction can develop with use of Opioids. The incidence of this is very unlikely in patients who take the  medications as ordered and stop the medications as instructed.  - Opioid overdose can be dangerous, but is unlikely when the medication is taken as ordered and stopped when ordered. It is important not to mix opioids with alcohol as this can lead to over sedation and respiratory difficulty.  - DOSAGE:  - After the initial surgical pain begins to resolve, you may begin to decrease the pain medication. By the end of a few weeks, you should be off of pain medications.  - Refills will not be given by the office during evening hours, on weekends, or after 6 weeks post-op. You are responsible for weaning off of pain medication. You can increase the time between narcotic pills, taking one every 4 then 6 then 8 hours and so on.  - To seek refills on pain medications during the initial 6-week post-operative period, you must call the office to request the refill. The office will then notify you when to  the prescription. DO NOT wait until you are out of the medication to request a refill. Prescriptions will not be filled over the weekend and depending on the schedule, it may take a couple days for the prescription to be available. Someone will have to pick the prescription in person at the office.    • FOLLOW-UP VISITS  o You will need to follow up in the office with your surgeon in 3 weeks, or as instructed elsewhere in your discharge paperwork. Please call this number [phone] to schedule this appointment. If you are going to an SNF/SNU facility, they will arrange for you to follow up in the office.  o If you have any concerns or suspected complications prior to your follow up visit, please call the office. Do not wait until your appointment time if you suspect complications. These will need to be addressed in the office promptly.      Robbin Jennings II, MD  Orthopaedic Surgery  [facility]

## 2020-02-12 NOTE — THERAPY DISCHARGE NOTE
Patient Name: Ava Lagos  : 1955    MRN: [id number]                              Today's Date: 2020       Admit Date: 2020    Visit Dx:     ICD-10-CM ICD-9-CM   1. Status post total knee replacement, unspecified laterality Z96.659 V43.65     Patient Active Problem List   Diagnosis   • Total knee replacement status     Past Medical History:   Diagnosis Date   • Arthritis    • COPD (chronic obstructive pulmonary disease) (CMS/[facility])    • Diabetes mellitus (CMS/[facility])    • Diverticulitis    • Heart murmur    • Neck pain    • Right knee pain      Past Surgical History:   Procedure Laterality Date   • BUNIONECTOMY Bilateral    •  SECTION     • COLONOSCOPY     • GALLBLADDER SURGERY     • KNEE ARTHROSCOPY Right    • OVARIAN CYST SURGERY     • UMBILICAL HERNIA REPAIR       General Information     Row Name 20 1308          PT Evaluation Time/Intention    Document Type  discharge treatment  -MS     Mode of Treatment  physical therapy  -MS     Row Name 20 1308          General Information    Existing Precautions/Restrictions  fall  -MS     Row Name 20 1308          Cognitive Assessment/Intervention- PT/OT    Orientation Status (Cognition)  oriented x 4  -MS       User Key  (r) = Recorded By, (t) = Taken By, (c) = Cosigned By    Initials Name Provider Type    MS Eunice Minor, PT Physical Therapist        Mobility     Row Name 20 1308          Bed Mobility Assessment/Treatment    Comment (Bed Mobility)  NT - UIC upon PT arrival  -MS     Row Name 20 1308          Sit-Stand Transfer    Sit-Stand [place] (Transfers)  supervision;verbal cues;nonverbal cues (demo/gesture)  -MS     Assistive Device (Sit-Stand Transfers)  walker, front-wheeled  -MS     Row Name 20 1308          Gait/Stairs Assessment/Training    Gait/Stairs Assessment/Training  gait/ambulation independence  -MS     [place] Level (Gait)  supervision;nonverbal cues (demo/gesture);verbal  cues  -MS     Assistive Device (Gait)  walker, front-wheeled  -MS     Distance in Feet (Gait)  80  -MS     Deviations/Abnormal Patterns (Gait)  kat decreased;right sided deviations;antalgic  -MS     Bilateral Gait Deviations  forward flexed posture;weight shift ability decreased  -MS     Right Sided Gait Deviations  heel strike decreased  -MS     Negotiation (Stairs)  stairs independence  -MS     [place] Level (Stairs)  contact guard;verbal cues;nonverbal cues (demo/gesture)  -MS     Handrail Location (Stairs)  right side (ascending)  -MS     Comment (Gait/Stairs)  4 steps with R HR descending and B HR with ascending- no issues.  -MS       User Key  (r) = Recorded By, (t) = Taken By, (c) = Cosigned By    Initials Name Provider Type    Eunice Escobar, PT Physical Therapist        Obj/Interventions     Row Name 02/12/20 1309          General ROM    GENERAL ROM COMMENTS  R knee AROM 0-94'  -MS     Row Name 02/12/20 1309          Therapeutic Exercise    Sets/Reps (Therapeutic Exercise)  TKA protocol x 20 reps  -MS       User Key  (r) = Recorded By, (t) = Taken By, (c) = Cosigned By    Initials Name Provider Type    Eunice Escobar, PT Physical Therapist        Goals/Plan    No documentation.       Clinical Impression     Row Name 02/12/20 1310          Pain Scale: Numbers Pre/Post-Treatment    Pain Scale: Numbers, Pretreatment  6/10  -MS     Pain Scale: Numbers, Post-Treatment  6/10  -MS     Pain Location - Side  Right  -MS     Pain Location - Orientation  incisional  -MS     Pain Location  knee  -MS     Pain Intervention(s)  Repositioned;Ambulation/increased activity;Rest  -MS     Row Name 02/12/20 1310          Positioning and Restraints    Pre-Treatment Position  sitting in chair/recliner  -MS     Post Treatment Position  bed  -MS     In Bed  sitting EOB;with family/caregiver  -MS       User Key  (r) = Recorded By, (t) = Taken By, (c) = Cosigned By    Initials Name Provider Type    MS Minor  Eunice OJNES, PT Physical Therapist        Outcome Measures     Row Name 02/12/20 1310          How much help from another person do you currently need...    Turning from your back to your side while in flat bed without using bedrails?  4  -MS     Moving from lying on back to sitting on the side of a flat bed without bedrails?  4  -MS     Moving to and from a bed to a chair (including a wheelchair)?  4  -MS     Standing up from a chair using your arms (e.g., wheelchair, bedside chair)?  4  -MS     Climbing 3-5 steps with a railing?  3  -MS     To walk in hospital room?  4  -MS     AM-PAC 6 Clicks Score (PT)  23  -MS       User Key  (r) = Recorded By, (t) = Taken By, (c) = Cosigned By    Initials Name Provider Type    Eunice Escobar, PT Physical Therapist          PT Recommendation and Plan     Outcome Summary/Treatment Plan (PT)  Anticipated Discharge Disposition (PT): home with assist, home with home health  Plan of Care Reviewed With: patient  Progress: improving     Time Calculation:   PT Charges     Row Name 02/12/20 1307             Time Calculation    Start Time  1036  -MS      Stop Time  1140  -MS      Time Calculation (min)  64 min  -MS      PT Received On  02/12/20  -MS        User Key  (r) = Recorded By, (t) = Taken By, (c) = Cosigned By    Initials Name Provider Type    Eunice Escobar, PT Physical Therapist        Therapy Charges for Today     Code Description Service Date Service Provider Modifiers Qty    36294753553 HC PT THER PROC GROUP 2/12/2020 Eunice Minor, PT GP 1    59051392182 HC PT THER PROC EA 15 MIN 2/12/2020 Eunice Minor, PT GP 1          PT G-Codes  Outcome Measure Options: AM-PAC 6 Clicks Basic Mobility (PT)  AM-PAC 6 Clicks Score (PT): 23    PT Discharge Summary  Anticipated Discharge Disposition (PT): home with assist, home with home health    Eunice Minor PT  2/12/2020

## 2020-02-12 NOTE — PLAN OF CARE
Problem: Patient Care Overview  Goal: Plan of Care Review  Outcome: Ongoing (interventions implemented as appropriate)  Note:   Pt is a post op day 1 of a rt total knee. Dressing is clean dry and intact. Pt continues with the ACE wrap and JACKIE dressing. Pt has ambulated to the bathroom with an assist x1. Voiding function intact. Pt continues with PO pain meds that provides relief. Pt educated on the importance of monitoring blood sugars related to comorbidity of DM. Pt voiced understanding. Pt is resting at this time, will continue to monitor.

## 2020-02-27 ENCOUNTER — TRANSCRIBE ORDERS (OUTPATIENT)
Dept: PHYSICAL THERAPY | Facility: HOSPITAL | Age: 65
End: 2020-02-27

## 2020-02-27 DIAGNOSIS — Z96.651 S/P TKR (TOTAL KNEE REPLACEMENT), RIGHT: Primary | ICD-10-CM

## 2020-03-04 ENCOUNTER — HOSPITAL ENCOUNTER (OUTPATIENT)
Dept: PHYSICAL THERAPY | Facility: HOSPITAL | Age: 65
Setting detail: THERAPIES SERIES
Discharge: HOME | End: 2020-03-04

## 2020-03-04 DIAGNOSIS — M25.561 CHRONIC PAIN OF RIGHT KNEE: Primary | ICD-10-CM

## 2020-03-04 DIAGNOSIS — Z47.89 ORTHOPEDIC AFTERCARE: ICD-10-CM

## 2020-03-04 DIAGNOSIS — R26.2 DIFFICULTY WALKING: ICD-10-CM

## 2020-03-04 DIAGNOSIS — G89.29 CHRONIC PAIN OF RIGHT KNEE: Primary | ICD-10-CM

## 2020-03-04 DIAGNOSIS — R53.1 WEAKNESS: ICD-10-CM

## 2020-03-04 PROCEDURE — 97110 THERAPEUTIC EXERCISES: CPT

## 2020-03-04 PROCEDURE — 97161 PT EVAL LOW COMPLEX 20 MIN: CPT

## 2020-03-04 NOTE — THERAPY EVALUATION
Outpatient Physical Therapy Ortho Initial Evaluation   [place]     Patient Name: Ava Lagos  : 1955  MRN: [id number]  Today's Date: 3/4/2020      Visit Date: 2020    Patient Active Problem List   Diagnosis   • Total knee replacement status        Past Medical History:   Diagnosis Date   • Arthritis    • COPD (chronic obstructive pulmonary disease) (CMS/[facility])    • Diabetes mellitus (CMS/[facility])    • Diverticulitis    • Heart murmur    • Neck pain    • Right knee pain         Past Surgical History:   Procedure Laterality Date   • BUNIONECTOMY Bilateral    •  SECTION     • COLONOSCOPY     • GALLBLADDER SURGERY     • KNEE ARTHROSCOPY Right    • OVARIAN CYST SURGERY     • TOTAL KNEE ARTHROPLASTY Right 2020    Procedure: TOTAL KNEE ARTHROPLASTY;  Surgeon: Robbin Jennings II, MD;  Location: [facility];  Service: Orthopedics;  Laterality: Right;   • UMBILICAL HERNIA REPAIR         Visit Dx:     ICD-10-CM ICD-9-CM   1. Chronic pain of right knee M25.561 719.46    G89.29 338.29   2. Orthopedic aftercare Z47.89 V54.9   3. Difficulty walking R26.2 719.7   4. Weakness R53.1 780.79         Patient History     Row Name 20 0900             History    Chief Complaint  Pain  -RS      Type of Pain  Knee pain  -RS      Date Current Problem(s) Began  20  -RS      Brief Description of Current Complaint  The pt is a 63 yo female who presents s/p  R TKA on 20, she went home the following day and had home health PT until last Thursday. 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 She has been doing her HEP 2-3 times per day, she has stairs. Her pain has been relatively well controlled. She reports a long history of R knee pain prior to surgery. She enjoys woodworking, walking in the yard to take care of her chickens, going on walks. Hx lo back pain/ sciatic pain which is acting up some with walking  differently.  -RS      Previous treatment for THIS PROBLEM  Surgery  -RS      Surgery Date:  02/11/20  -RS      Patient/Caregiver Goals  Relieve pain;Return to prior level of function;Return to work;Improve strength;Know what to do to help the symptoms  -RS      Hand Dominance  right-handed  -RS      Are you or can you be pregnant  No  -RS         Pain     Pain Location  Knee  -RS      Pain at Present  2  -RS      Pain at Worst  3  -RS      Is your sleep disturbed?  Yes  -RS      Difficulties with recreational activities?  woodworking, walking in yard, taking care of chickens  -RS         Fall Risk Assessment    Any falls in the past year:  No  -RS         Services    Prior Rehab/Home Health Experiences  Yes  -RS      Are you currently receiving Home Health services  No  -RS         Daily Activities    Primary Language  English  -RS      How does patient learn best?  Listening;Reading;Demonstration  -RS      Teaching needs identified  Home Exercise Program  -RS      Pt Participated in POC and Goals  Yes  -RS         Safety    Are you being hurt, hit, or frightened by anyone at home or in your life?  No  -RS      Are you being neglected by a caregiver  No  -RS        User Key  (r) = Recorded By, (t) = Taken By, (c) = Cosigned By    Initials Name Provider Type    RS Yue Larios PT Physical Therapist          PT Ortho     Row Name 03/04/20 0900       Posture/Observations    Observations  Incision healing  -RS    Posture/Observations Comments  slight edema RLE, incision clean and dry  -RS       General ROM    RT Lower Ext  Rt Knee Extension/Flexion  -RS    LT Lower Ext  Lt Knee Extension/Flexion  -RS       Right Lower Ext    Rt Knee Extension/Flexion AROM  lacking   -RS    Rt Knee Extension/Flexion PROM  lacking   -RS    RT Lower Extremity Comments  SLR- quad lag 15 degrees  -RS       Left Lower Ext    Lt Knee Extension/Flexion AROM  0-130  -RS       MMT (Manual Muscle Testing)    Rt Lower Ext  Rt Hip  Flexion;Rt Hip ABduction;Rt Knee Extension;Rt Knee Flexion;Rt Ankle Plantarflexion;Rt Ankle Dorsiflexion  -RS    Lt Lower Ext  Lt Hip Flexion;Lt Hip ABduction;Lt Knee Extension;Lt Knee Flexion;Lt Ankle Plantarflexion;Lt Ankle Dorsiflexion  -RS       MMT Right Lower Ext    Rt Hip Flexion MMT, Gross Movement  (4/5) good  -RS    Rt Hip ABduction MMT, Gross Movement  (4-/5) good minus  -RS    Rt Knee Extension MMT, Gross Movement  (3-/5) fair minus  -RS    Rt Knee Flexion MMT, Gross Movement  (3+/5) fair plus  -RS    Rt Ankle Plantarflexion MMT, Gross Movement  (4-/5) good minus  -RS    Rt Ankle Dorsiflexion MMT, Gross Movement  (4/5) good  -RS       MMT Left Lower Ext    Lt Hip Flexion MMT, Gross Movement  (5/5) normal  -RS    Lt Hip ABduction MMT, Gross Movement  (4/5) good  -RS    Lt Knee Extension MMT, Gross Movement  (5/5) normal  -RS    Lt Knee Flexion MMT, Gross Movement  (4+/5) good plus  -RS    Lt Ankle Plantarflexion MMT, Gross Movement  (4+/5) good plus  -RS    Lt Ankle Dorsiflexion MMT, Gross Movement  (5/5) normal  -RS       Sensation    Sensation WNL?  WFL  -RS       Gait/Stairs Assessment/Training    Comment (Gait/Stairs)  ambulates with SPC, antalgic pattern on RLE, decreased kat, increased lateral trunk lean, decreased heel strike RLE  -RS      User Key  (r) = Recorded By, (t) = Taken By, (c) = Cosigned By    Initials Name Provider Type    RS Yue Lairos PT Physical Therapist                      Therapy Education  Education Details: Role of outpatient PT, POC, differential diagnosis, initial HEP, expectations, goals.  Given: HEP, Posture/body mechanics  Program: New  How Provided: Verbal, Demonstration, Written  Provided to: Patient  Level of Understanding: Teach back education performed, Verbalized, Demonstrated     PT OP Goals     Row Name 03/04/20 0900          PT Short Term Goals    STG Date to Achieve  03/25/20  -RS     STG 1  The pt will demonstrate IND and compliant with initial HEP  focused on R knee AROM.  -RS     STG 1 Progress  New  -RS     STG 2  The pt will demonstrate R knee AROM 0-120 for improved functional mobility and gait pattern.  -RS     STG 2 Progress  New  -RS     STG 3  The pt will ambulate with SOC and demonstrate near normal gait pattern/ heel strike.  -RS     STG 3 Progress  New  -RS     STG 4  The pt will perform 10 IND SLR without quad lag noted for improved quad activation.  -RS     STG 4 Progress  New  -RS        Long Term Goals    LTG Date to Achieve  05/03/20  -RS     LTG 1  The pt will demonstrate IND and compliant with progressive HEP focused on IND condition management and return to PLOF.  -RS     LTG 1 Progress  New  -RS     LTG 2  The pt will resume reciprocal stair navigation for improved home navigation.  -RS     LTG 2 Progress  New  -RS     LTG 3  The pt will score greater than or equal to 55% ability on the KOS to indicate imptoved perceived performance of ADLs.  -RS     LTG 3 Progress  New  -RS     LTG 4  The pt will resume walking over uneven ground in backyard without AD for return to recreational activity performance (caring for chickens, woodworking).  -RS     LTG 4 Progress  New  -RS     LTG 5  The pt will tolerate prolonged standing/ walking at least 30 min for improved community navigation and potential return to work activities.  -RS     LTG 5 Progress  New  -RS        Time Calculation    PT Goal Re-Cert Due Date  06/02/20  -RS       User Key  (r) = Recorded By, (t) = Taken By, (c) = Cosigned By    Initials Name Provider Type    RS Yue Larios, PT Physical Therapist          PT Assessment/Plan     Row Name 03/04/20 0900          PT Assessment    Functional Limitations  Decreased safety during functional activities;Impaired gait;Limitation in home management;Limitations in community activities;Limitations in functional capacity and performance;Performance in leisure activities;Performance in work activities  -RS     Impairments   Cognition;Endurance;Gait;Range of motion;Poor body mechanics;Pain;Muscle strength;Joint mobility  -RS     Assessment Comments  Ava Lagos is a 64 y.o. femalereferred to physical therapy s/p R TKA on 2/11/2020. She presents with a stable clinical presentation, along with no remarkable comorbidities and personal factors of previous history of chronic R knee pain that may impact her progress in the plan of care. Pt presents today with decreased R knee Active and passive mobility lacking full extension during initial contact and 105 degrees flex, she also demonstrates quad lag 125 deg R LE and decreased RLE strength with altered gait pattern, pain is well controlled  . her signs and symptoms are consistent with referring diagnosis. The previous impairments limit her ability to perform self care ADLs, walk without AD, navigate stairs, return to work, return to rec activities. She scores 38% ability on the KOS where 100% is full ability . Pt will benefit from skilled PT to address the previous impairments and return to PLOF.  -RS     Please refer to paper survey for additional self-reported information  Yes  -RS     Rehab Potential  Good  -RS     Patient/caregiver participated in establishment of treatment plan and goals  Yes  -RS     Patient would benefit from skilled therapy intervention  Yes  -RS        PT Plan    PT Frequency  2x/week  -RS     Predicted Duration of Therapy Intervention (Therapy Eval)  8-10 weeks  -RS     Planned CPT's?  PT EVAL LOW COMPLEXITY: 21132;PT RE-EVAL: 09167;PT THER PROC EA 15 MIN: 00997;PT THER ACT EA 15 MIN: 13869;PT MANUAL THERAPY EA 15 MIN: 71942;PT NEUROMUSC RE-EDUCATION EA 15 MIN: 86175;PT GAIT TRAINING EA 15 MIN: 64185;PT SELF CARE/HOME MGMT/TRAIN EA 15: 19706;PT HOT OR COLD PACK TREAT MCARE;PT ELECTRICAL STIM UNATTEND:   -RS     PT Plan Comments  Skilled PT focused on R knee ROM and strength. COnsider Russian stim R quad,  SAQ/LAQ, heel raise, backward walking, gait  train  -RS       User Key  (r) = Recorded By, (t) = Taken By, (c) = Cosigned By    Initials Name Provider Type    RS Yue Larios PT Physical Therapist          Modalities     Row Name 03/04/20 1100             Ice    Patient reports will apply ice at home to involved area  Yes  -RS        User Key  (r) = Recorded By, (t) = Taken By, (c) = Cosigned By    Initials Name Provider Type    RS Yue Larios PT Physical Therapist        OP Exercises     Row Name 03/04/20 1000             Total Minutes    04877 - PT Therapeutic Exercise Minutes  25  -RS         Exercise 1    Exercise Name 1  quad set  -RS      Cueing 1  Verbal  -RS      Reps 1  10  -RS      Time 1  5s  -RS      Additional Comments  towel  -RS         Exercise 2    Exercise Name 2  Nustep  -RS      Cueing 2  Verbal  -RS      Time 2  5 min  -RS      Additional Comments  UE/LE  -RS         Exercise 3    Exercise Name 3  heel prop stretch  -RS      Cueing 3  Verbal  -RS      Time 3  4 min  -RS         Exercise 4    Exercise Name 4  knee flex stretch stairs  -RS      Cueing 4  Verbal;Demo  -RS      Reps 4  5  -RS      Time 4  20s  -RS         Exercise 5    Exercise Name 5  HS stretch stairs  -RS      Cueing 5  Verbal;Demo  -RS      Reps 5  3  -RS      Time 5  20  -RS         Exercise 6    Exercise Name 6  TKE ball  -RS      Cueing 6  Verbal  -RS      Reps 6  10  -RS      Time 6  5s  -RS         Exercise 7    Exercise Name 7  gait with SPC  -RS      Cueing 7  Verbal  -RS      Time 7  5 min  -RS      Additional Comments  cue for heel strike, upright posture, gait pattern  -RS        User Key  (r) = Recorded By, (t) = Taken By, (c) = Cosigned By    Initials Name Provider Type    RS Yue Larios PT Physical Therapist                        Outcome Measure Options: Knee Outcome Score- ADL  Knee Outcome Score  Knee Outcome Score Comments: 38% ability      Time Calculation:     Start Time: 0930  Stop Time: 1015  Time Calculation (min): 45 min      Therapy Charges for Today     Code Description Service Date Service Provider Modifiers Qty    33157129487  PT THER PROC EA 15 MIN 3/4/2020 Yue Larios, PT GP 2    41279882491 HC PT EVAL LOW COMPLEXITY 1 3/4/2020 Yue Larios, PT GP 1          PT G-Codes  Outcome Measure Options: Knee Outcome Score- ADL         Yue Larios, PT  3/4/2020

## 2020-03-05 ENCOUNTER — HOSPITAL ENCOUNTER (OUTPATIENT)
Dept: PHYSICAL THERAPY | Facility: HOSPITAL | Age: 65
Setting detail: THERAPIES SERIES
Discharge: HOME | End: 2020-03-05

## 2020-03-05 DIAGNOSIS — R26.2 DIFFICULTY WALKING: ICD-10-CM

## 2020-03-05 DIAGNOSIS — Z47.89 ORTHOPEDIC AFTERCARE: ICD-10-CM

## 2020-03-05 DIAGNOSIS — R53.1 WEAKNESS: ICD-10-CM

## 2020-03-05 DIAGNOSIS — M25.561 CHRONIC PAIN OF RIGHT KNEE: Primary | ICD-10-CM

## 2020-03-05 DIAGNOSIS — G89.29 CHRONIC PAIN OF RIGHT KNEE: Primary | ICD-10-CM

## 2020-03-05 PROCEDURE — 97110 THERAPEUTIC EXERCISES: CPT

## 2020-03-05 PROCEDURE — G0283 ELEC STIM OTHER THAN WOUND: HCPCS

## 2020-03-05 NOTE — THERAPY TREATMENT NOTE
Outpatient Physical Therapy Ortho Treatment Note  [facility]     Patient Name: Ava Lagos  : 1955  MRN: [id number]  Today's Date: 3/5/2020      Visit Date: 2020    Visit Dx:    ICD-10-CM ICD-9-CM   1. Chronic pain of right knee M25.561 719.46    G89.29 338.29   2. Orthopedic aftercare Z47.89 V54.9   3. Difficulty walking R26.2 719.7   4. Weakness R53.1 780.79       Patient Active Problem List   Diagnosis   • Total knee replacement status        Past Medical History:   Diagnosis Date   • Arthritis    • COPD (chronic obstructive pulmonary disease) (CMS/[facility])    • Diabetes mellitus (CMS/[facility])    • Diverticulitis    • Heart murmur    • Neck pain    • Right knee pain         Past Surgical History:   Procedure Laterality Date   • BUNIONECTOMY Bilateral    •  SECTION     • COLONOSCOPY     • GALLBLADDER SURGERY     • KNEE ARTHROSCOPY Right    • OVARIAN CYST SURGERY     • TOTAL KNEE ARTHROPLASTY Right 2020    Procedure: TOTAL KNEE ARTHROPLASTY;  Surgeon: Robbin Jennings II, MD;  Location: [facility];  Service: Orthopedics;  Laterality: Right;   • UMBILICAL HERNIA REPAIR                         PT Assessment/Plan     Row Name 20 1600          PT Assessment    Assessment Comments  Ms. Macias reports good compliance with HEP over past day, no significant increase in pain. She continues to be limited in Quad activation, performed assisted SLR and NMES at end of session in TKE position focuse don quad activation, no immediate adverse effects noted. Focusing initially on improved knee mobililty and quad activation then progressing strength as tolerance allows.  -RS        PT Plan    PT Plan Comments  Assess tolerance for NMES, continue until can perform full quad set/ SLR without quad lag.  -RS       User Key  (r) = Recorded By, (t) = Taken By, (c) = Cosigned By    Initials Name Provider Type    RS Yue Larios, PT Physical Therapist          Modalities     Row Name  03/05/20 1500             Ice    Patient reports will apply ice at home to involved area  Yes  -RS         ELECTRICAL STIMULATION    Attended/Unattended  Unattended  -RS      Stimulation Type  Russian  -RS      Max mAmp  62  -RS      Location/Electrode Placement/Other  R quad 10 sec on/ 30 sec off  -RS       PT E-Stim Unattended (Manual) Minutes  10  -RS        User Key  (r) = Recorded By, (t) = Taken By, (c) = Cosigned By    Initials Name Provider Type    RS Yue Larios, PT Physical Therapist        OP Exercises     Row Name 03/05/20 1500             Subjective Comments    Subjective Comments  Feeling some stiffness but not significant pain..  -RS         Subjective Pain    Able to rate subjective pain?  yes  -RS      Subjective Pain Comment  stiff/sore  -RS         Total Minutes    20757 - PT Therapeutic Exercise Minutes  30  -RS         Exercise 1    Exercise Name 1  quad set  -RS      Cueing 1  Verbal  -RS      Reps 1  10  -RS      Time 1  5s  -RS      Additional Comments  noodle, tactile cue at quad, small activation noted  -RS         Exercise 2    Exercise Name 2  Nustep  -RS      Cueing 2  Verbal  -RS      Time 2  5 min  -RS      Additional Comments  UE/LE  -RS         Exercise 3    Exercise Name 3  heel prop stretch  -RS      Cueing 3  --  -RS      Time 3  --  -RS      Additional Comments  home  -RS         Exercise 4    Exercise Name 4  knee flex stretch stairs  -RS      Cueing 4  Verbal;Demo  -RS      Reps 4  5  -RS      Time 4  20s  -RS         Exercise 5    Exercise Name 5  HS stretch stairs  -RS      Cueing 5  Verbal;Demo  -RS      Reps 5  3  -RS      Time 5  20  -RS         Exercise 6    Exercise Name 6  TKE ball  -RS      Cueing 6  --  -RS      Reps 6  --  -RS      Time 6  --  -RS      Additional Comments  during stim  -RS         Exercise 7    Exercise Name 7  gait with SPC  -RS      Cueing 7  Verbal  -RS      Time 7  3 min  -RS      Additional Comments  cues for pattern, heel strike,  equal WB  -RS         Exercise 8    Exercise Name 8  SLR  -RS      Cueing 8  Verbal;Tactile  -RS      Sets 8  2  -RS      Reps 8  10  -RS      Additional Comments  PT assist lifting/lowering, quad lag noted  -RS         Exercise 9    Exercise Name 9  SAQ  -RS      Cueing 9  Verbal  -RS      Reps 9  15  -RS      Time 9  3s  -RS         Exercise 10    Exercise Name 10  rec bike  -RS      Cueing 10  Verbal;Demo  -RS      Time 10  5 min total  -RS      Additional Comments  initiall back then fwd, ankle pointed initially then DF  -RS        User Key  (r) = Recorded By, (t) = Taken By, (c) = Cosigned By    Initials Name Provider Type    RS Yue Larios, PT Physical Therapist                       PT OP Goals     Row Name 03/05/20 1500          PT Short Term Goals    STG Date to Achieve  03/25/20  -RS     STG 1  The pt will demonstrate IND and compliant with initial HEP focused on R knee AROM.  -RS     STG 1 Progress  Ongoing  -RS     STG 2  The pt will demonstrate R knee AROM 0-120 for improved functional mobility and gait pattern.  -RS     STG 2 Progress  Ongoing  -RS     STG 3  The pt will ambulate with SOC and demonstrate near normal gait pattern/ heel strike.  -RS     STG 3 Progress  Ongoing  -RS     STG 4  The pt will perform 10 IND SLR without quad lag noted for improved quad activation.  -RS     STG 4 Progress  Ongoing  -RS        Long Term Goals    LTG Date to Achieve  05/03/20  -RS     LTG 1  The pt will demonstrate IND and compliant with progressive HEP focused on IND condition management and return to PLOF.  -RS     LTG 1 Progress  Ongoing  -RS     LTG 2  The pt will resume reciprocal stair navigation for improved home navigation.  -RS     LTG 2 Progress  Ongoing  -RS     LTG 3  The pt will score greater than or equal to 55% ability on the KOS to indicate imptoved perceived performance of ADLs.  -RS     LTG 3 Progress  Ongoing  -RS     LTG 4  The pt will resume walking over uneven ground in backyard  without AD for return to recreational activity performance (caring for chickens, woodworking).  -RS     LTG 4 Progress  Ongoing  -RS     LTG 5  The pt will tolerate prolonged standing/ walking at least 30 min for improved community navigation and potential return to work activities.  -RS     LTG 5 Progress  Ongoing  -RS       User Key  (r) = Recorded By, (t) = Taken By, (c) = Cosigned By    Initials Name Provider Type    RS Yue Larios PT Physical Therapist                         Time Calculation:   Start Time: 1530  Stop Time: 1617  Time Calculation (min): 47 min  Therapy Charges for Today     Code Description Service Date Service Provider Modifiers Qty    91742179793  PT THER PROC EA 15 MIN 3/5/2020 Yue Larios, PT GP 2    [id number]  PT ELECTRICAL STIM UNATTENDED 3/5/2020 Yue Larios, PT  1                    Yue Larios PT  3/5/2020

## 2020-03-09 ENCOUNTER — HOSPITAL ENCOUNTER (OUTPATIENT)
Dept: PHYSICAL THERAPY | Facility: HOSPITAL | Age: 65
Setting detail: THERAPIES SERIES
Discharge: HOME | End: 2020-03-09

## 2020-03-09 DIAGNOSIS — M25.561 CHRONIC PAIN OF RIGHT KNEE: Primary | ICD-10-CM

## 2020-03-09 DIAGNOSIS — Z47.89 ORTHOPEDIC AFTERCARE: ICD-10-CM

## 2020-03-09 DIAGNOSIS — G89.29 CHRONIC PAIN OF RIGHT KNEE: Primary | ICD-10-CM

## 2020-03-09 DIAGNOSIS — R26.2 DIFFICULTY WALKING: ICD-10-CM

## 2020-03-09 DIAGNOSIS — R53.1 WEAKNESS: ICD-10-CM

## 2020-03-09 PROCEDURE — 97140 MANUAL THERAPY 1/> REGIONS: CPT | Performed by: PHYSICAL THERAPIST

## 2020-03-09 PROCEDURE — 97112 NEUROMUSCULAR REEDUCATION: CPT | Performed by: PHYSICAL THERAPIST

## 2020-03-09 PROCEDURE — 97110 THERAPEUTIC EXERCISES: CPT | Performed by: PHYSICAL THERAPIST

## 2020-03-09 NOTE — THERAPY TREATMENT NOTE
Outpatient Physical Therapy Ortho Treatment Note  [facility]     Patient Name: Ava Lagos  : 1955  MRN: [id number]  Today's Date: 3/9/2020      Visit Date: 2020    Visit Dx:    ICD-10-CM ICD-9-CM   1. Chronic pain of right knee M25.561 719.46    G89.29 338.29   2. Orthopedic aftercare Z47.89 V54.9   3. Difficulty walking R26.2 719.7   4. Weakness R53.1 780.79       Patient Active Problem List   Diagnosis   • Total knee replacement status        Past Medical History:   Diagnosis Date   • Arthritis    • COPD (chronic obstructive pulmonary disease) (CMS/[facility])    • Diabetes mellitus (CMS/[facility])    • Diverticulitis    • Heart murmur    • Neck pain    • Right knee pain         Past Surgical History:   Procedure Laterality Date   • BUNIONECTOMY Bilateral    •  SECTION     • COLONOSCOPY     • GALLBLADDER SURGERY     • KNEE ARTHROSCOPY Right    • OVARIAN CYST SURGERY     • TOTAL KNEE ARTHROPLASTY Right 2020    Procedure: TOTAL KNEE ARTHROPLASTY;  Surgeon: Robbin Jennings II, MD;  Location: [facility];  Service: Orthopedics;  Laterality: Right;   • UMBILICAL HERNIA REPAIR                         PT Assessment/Plan     Row Name 20 1456          PT Assessment    Assessment Comments  Continued with basic AROM and strengthening program s/p TKR. Still lacks full knee extension. Held on SLR due to sciatic irritation. Verbal cueing for improved heel strike with ambulation. Improved quad activation noted today without NMES but will monitor for consistency.  -       User Key  (r) = Recorded By, (t) = Taken By, (c) = Cosigned By    Initials Name Provider Type    Ghassan Coe, PT Physical Therapist          Modalities     Row Name 20 1400             Ice    Ice Applied  Yes  -      Location  R knee, propped on pillows  -CJ      Rx Minutes  10 mins  -      Ice S/P Rx  Yes  -        User Key  (r) = Recorded By, (t) = Taken By, (c) = Cosigned By    Initials Name  Provider Type    CJ Ghassan Oleary, PT Physical Therapist        OP Exercises     Row Name 03/09/20 1400 03/09/20 1300          Subjective Comments    Subjective Comments  stiffness and tightness. Did a lot of walking yesterday to see a play. I am icing though.  -CJ  --        Subjective Pain    Able to rate subjective pain?  yes  -CJ  --     Pre-Treatment Pain Level  2  -CJ  --     Subjective Pain Comment  stiff  -CJ  --        Total Minutes    06457 - PT Therapeutic Exercise Minutes  20  -CJ  --     85781 -  PT Neuromuscular Reeducation Minutes  15  -CJ  --     38644 - PT Manual Therapy Minutes  --  10  -CJ        Exercise 1    Exercise Name 1  quad set with heel prop  -CJ  --     Cueing 1  Verbal  -CJ  --     Reps 1  15  -CJ  --     Time 1  5s  -CJ  --        Exercise 2    Exercise Name 2  Nustep  -CJ  --     Cueing 2  Verbal  -CJ  --     Time 2  5 min  -CJ  --     Additional Comments  UE/LE L3  -CJ  --        Exercise 3    Exercise Name 3  heel prop stretch  -CJ  --     Time 3  with gentle PA mobs  -CJ  --        Exercise 4    Exercise Name 4  knee flex stretch stairs  -CJ  --     Cueing 4  Verbal;Demo  -CJ  --     Reps 4  5  -CJ  --     Time 4  20s  -CJ  --        Exercise 5    Exercise Name 5  Longsitting HS stretch  -CJ  --     Reps 5  4   -CJ  --     Time 5  20 sec  -CJ  --        Exercise 6    Exercise Name 6  wall slides  -CJ  --     Reps 6  10  -CJ  --     Time 6  5 sec  -CJ  --        Exercise 7    Exercise Name 7  gait with SPC  -CJ  --     Cueing 7  Verbal;Demo  -CJ  --     Time 7  3 min  -CJ  --     Additional Comments  cues for excessive heel strike  -CJ  --        Exercise 8    Exercise Name 8  hold secondary to sciatic issues  -CJ  --        Exercise 9    Exercise Name 9  SAQ  -CJ  --     Cueing 9  Verbal  -CJ  --     Reps 9  15  -CJ  --     Time 9  5 sec  -CJ  --        Exercise 10    Exercise Name 10  rec bike  -CJ  --     Cueing 10  Verbal;Demo  -CJ  --     Time 10  4 min total, seat 7  -CJ  --      Additional Comments  rocking until able to make full revolution  -  --        Exercise 11    Exercise Name 11  calf stretch  -CJ  --     Reps 11  4  -CJ  --     Time 11  20sec  -CJ  --        Exercise 12    Exercise Name 12  LAQ  -CJ  --     Reps 12  10  -CJ  --     Time 12  5 sec  -CJ  --        Exercise 13    Exercise Name 13  HS curl, seated  -CJ  --     Reps 13  10  -CJ  --     Time 13  5 sec  -CJ  --     Additional Comments  standing next session  -  --       User Key  (r) = Recorded By, (t) = Taken By, (c) = Cosigned By    Initials Name Provider Type    Ghassan Coe, PT Physical Therapist                      Manual Rx (last 36 hours)      Manual Treatments     Row Name 03/09/20 1300             Total Minutes    44963 - PT Manual Therapy Minutes  10  -CJ         Manual Rx 1    Manual Rx 1 Location  STM/MFR to quads, distal hamstrings, and proximal calf tissue  -AJAY      Manual Rx 1 Type  supine over bolster  -        User Key  (r) = Recorded By, (t) = Taken By, (c) = Cosigned By    Initials Name Provider Type    Ghassan Coe, PT Physical Therapist                             Time Calculation:   Start Time: 1400  Stop Time: 1455  Time Calculation (min): 55 min  Total Timed Code Minutes- PT: 45 minute(s)  Therapy Charges for Today     Code Description Service Date Service Provider Modifiers Qty    96857373135 HC PT NEUROMUSC RE EDUCATION EA 15 MIN 3/9/2020 Ghassan Oleary, PT GP 1    03295097615 HC PT THER PROC EA 15 MIN 3/9/2020 Ghassan Oleary, PT GP 1    67740064622 HC PT MANUAL THERAPY EA 15 MIN 3/9/2020 Ghassan Oleary, PT GP 1    63038931137 HC PT HOT OR COLD PACK TREAT [facility] 3/9/2020 Ghassan Oleary, PT GP 1                    Ghassan Oleary PT  3/9/2020

## 2020-03-11 ENCOUNTER — HOSPITAL ENCOUNTER (OUTPATIENT)
Dept: PHYSICAL THERAPY | Facility: HOSPITAL | Age: 65
Setting detail: THERAPIES SERIES
Discharge: HOME | End: 2020-03-11

## 2020-03-11 DIAGNOSIS — Z47.89 ORTHOPEDIC AFTERCARE: ICD-10-CM

## 2020-03-11 DIAGNOSIS — R26.2 DIFFICULTY WALKING: ICD-10-CM

## 2020-03-11 DIAGNOSIS — G89.29 CHRONIC PAIN OF RIGHT KNEE: Primary | ICD-10-CM

## 2020-03-11 DIAGNOSIS — M25.561 CHRONIC PAIN OF RIGHT KNEE: Primary | ICD-10-CM

## 2020-03-11 DIAGNOSIS — R53.1 WEAKNESS: ICD-10-CM

## 2020-03-11 PROCEDURE — 97112 NEUROMUSCULAR REEDUCATION: CPT

## 2020-03-11 PROCEDURE — 97110 THERAPEUTIC EXERCISES: CPT

## 2020-03-11 PROCEDURE — 97140 MANUAL THERAPY 1/> REGIONS: CPT

## 2020-03-11 NOTE — THERAPY TREATMENT NOTE
Outpatient Physical Therapy Ortho Treatment Note  [facility]     Patient Name: Ava Lagos  : 1955  MRN: [id number]  Today's Date: 3/11/2020      Visit Date: 2020    Visit Dx:    ICD-10-CM ICD-9-CM   1. Chronic pain of right knee M25.561 719.46    G89.29 338.29   2. Orthopedic aftercare Z47.89 V54.9   3. Difficulty walking R26.2 719.7   4. Weakness R53.1 780.79       Patient Active Problem List   Diagnosis   • Total knee replacement status        Past Medical History:   Diagnosis Date   • Arthritis    • COPD (chronic obstructive pulmonary disease) (CMS/HCC)    • Diabetes mellitus (CMS/[facility])    • Diverticulitis    • Heart murmur    • Neck pain    • Right knee pain         Past Surgical History:   Procedure Laterality Date   • BUNIONECTOMY Bilateral    •  SECTION     • COLONOSCOPY     • GALLBLADDER SURGERY     • KNEE ARTHROSCOPY Right    • OVARIAN CYST SURGERY     • TOTAL KNEE ARTHROPLASTY Right 2020    Procedure: TOTAL KNEE ARTHROPLASTY;  Surgeon: Robbin Jennings II, MD;  Location: [facility];  Service: Orthopedics;  Laterality: Right;   • UMBILICAL HERNIA REPAIR         PT Ortho     Row Name 20 1400       Right Lower Ext    Rt Knee Extension/Flexion AROM  lacks 15  -JA    Rt Knee Extension/Flexion PROM  105  -JA      User Key  (r) = Recorded By, (t) = Taken By, (c) = Cosigned By    Initials Name Provider Type    Liudmila Rascon, PT Physical Therapist                      PT Assessment/Plan     Row Name 20 1600          PT Assessment    Assessment Comments  Pt demonstrates improving quad control.  Noted considerable facilitated tissues in L hamstrings and gastrocs, partciualrly lateral hamstring tendon proximal to jt line.  Will benefit form continuee work to regain terminal knee extension, flexion, strength, and gait.  -ADRIÁN        PT Plan    PT Plan Comments  assess resposne to stretching hamstrings with green strap and adding gastroc str  -ADRIÁN        User Key  (r) = Recorded By, (t) = Taken By, (c) = Cosigned By    Initials Name Provider Type    Liudmila Rascon, PT Physical Therapist          Modalities     Row Name 03/11/20 1400             Ice    Ice Applied  Yes  -JA      Location  R knee, propped on pillows  -JA      Rx Minutes  10 mins  -JA      Ice S/P Rx  Yes  -JA        User Key  (r) = Recorded By, (t) = Taken By, (c) = Cosigned By    Initials Name Provider Type    Liudmila Rascon, PT Physical Therapist        OP Exercises     Row Name 03/11/20 1400 03/11/20 1300          Subjective Comments    Subjective Comments  I'm surprised I'm so  tired.    -JA  --        Subjective Pain    Able to rate subjective pain?  yes  -JA  --     Pre-Treatment Pain Level  2  -JA  --        Total Minutes    92472 - PT Therapeutic Exercise Minutes  20  -JA  --     92213 -  PT Neuromuscular Reeducation Minutes  15  -JA  --     30296 - PT Manual Therapy Minutes  --  10  -JA        Exercise 1    Exercise Name 1  quad set with pillow under length of leg  -JA  --     Cueing 1  Verbal  -JA  --     Reps 1  15  -JA  --     Time 1  5s  -JA  --     Additional Comments  performed after manual techniques  -JA  --        Exercise 2    Exercise Name 2  Nustep  -JA  --     Cueing 2  Verbal  -JA  --     Time 2  5 min  -JA  --     Additional Comments  UE/LE L5  -JA  --        Exercise 3    Exercise Name 3  heel prop stretch  -JA  --     Time 3  with gentle PA mobs  -JA  --        Exercise 4    Exercise Name 4  knee flex stretch stairs  -JA  --     Cueing 4  Verbal;Demo  -JA  --     Reps 4  5  -JA  --     Time 4  20s  -JA  --        Exercise 5    Exercise Name 5  Longsitting HS stretch  -JA  --     Reps 5  4   -JA  --     Time 5  20 sec  -JA  --     Additional Comments  added calf stretch during 2 of 4 stretches using green strap  -JA  --        Exercise 6    Exercise Name 6  wall slides  -JA  --     Reps 6  10  -JA  --     Time 6  5 sec  -JA  --        Exercise 7     Exercise Name 7  gait with SPC  -JA  --     Cueing 7  Verbal;Demo  -JA  --     Time 7  3 min  -JA  --        Exercise 8    Exercise Name 8  hold secondary to sciatic issues  -JA  --        Exercise 9    Exercise Name 9  SAQ  -JA  --     Cueing 9  Verbal  -JA  --     Reps 9  15  -JA  --     Time 9  5 sec  -JA  --        Exercise 10    Exercise Name 10  rec bike  -JA  --     Cueing 10  Verbal;Demo  -JA  --     Time 10  4 min total, seat 7  -JA  --     Additional Comments  1/2 revolutions until able to go around  -JA  --        Exercise 11    Exercise Name 11  calf stretch  -JA  --     Reps 11  4  -JA  --     Time 11  20sec  -JA  --        Exercise 12    Exercise Name 12  LAQ  -JA  --     Reps 12  10  -JA  --     Time 12  5 sec  -JA  --        Exercise 13    Exercise Name 13  HS curl, seated  -JA  --     Reps 13  10  -JA  --     Time 13  5 sec  -JA  --     Additional Comments  didn't stand today  -JA  --       User Key  (r) = Recorded By, (t) = Taken By, (c) = Cosigned By    Initials Name Provider Type    Liudmila Rascon, PT Physical Therapist                      Manual Rx (last 36 hours)      Manual Treatments     Row Name 03/11/20 1300             Total Minutes    62223 - PT Manual Therapy Minutes  10  -JA         Manual Rx 1    Manual Rx 1 Location  STM/MFR to quads, distal hamstrings, and proximal calf tissue  -ADRIÁN      Manual Rx 1 Type  supine w/L leg supported on pillow  -ADRIÁN        User Key  (r) = Recorded By, (t) = Taken By, (c) = Cosigned By    Initials Name Provider Type    Liudmila Rascon, PT Physical Therapist              Therapy Education  Education Details: Discussed healing time and fatigue is normal.  instructed in seated neural glides for management of sciatica symptoms.  Given: HEP, Symptoms/condition management, Pain management, Mobility training  Program: Reinforced, Progressed  How Provided: Verbal, Demonstration  Provided to: Patient  Level of Understanding: Teach back education  performed              Time Calculation:   Start Time: 1400  Stop Time: 1455  Time Calculation (min): 55 min  Therapy Charges for Today     Code Description Service Date Service Provider Modifiers Qty    82329982049 HC PT NEUROMUSC RE EDUCATION EA 15 MIN 3/11/2020 Liudmila Hardin, PT GP 1    09052241814 HC PT THER PROC EA 15 MIN 3/11/2020 Liudmila Hardin, PT GP 1    92727374021 HC PT MANUAL THERAPY EA 15 MIN 3/11/2020 Liudmila Hardin, PT GP 1    [id number]  PT HOT OR COLD PACK TREAT [facility] 3/11/2020 Liudmila Hardin, PT GP 1                    Liudmila Hardin, PT  3/11/2020

## 2020-03-13 ENCOUNTER — HOSPITAL ENCOUNTER (OUTPATIENT)
Dept: PHYSICAL THERAPY | Facility: HOSPITAL | Age: 65
Setting detail: THERAPIES SERIES
Discharge: HOME | End: 2020-03-13

## 2020-03-13 DIAGNOSIS — Z47.89 ORTHOPEDIC AFTERCARE: ICD-10-CM

## 2020-03-13 DIAGNOSIS — R53.1 WEAKNESS: ICD-10-CM

## 2020-03-13 DIAGNOSIS — G89.29 CHRONIC PAIN OF RIGHT KNEE: Primary | ICD-10-CM

## 2020-03-13 DIAGNOSIS — R26.2 DIFFICULTY WALKING: ICD-10-CM

## 2020-03-13 DIAGNOSIS — M25.561 CHRONIC PAIN OF RIGHT KNEE: Primary | ICD-10-CM

## 2020-03-13 PROCEDURE — 97110 THERAPEUTIC EXERCISES: CPT

## 2020-03-13 PROCEDURE — 97140 MANUAL THERAPY 1/> REGIONS: CPT

## 2020-03-13 PROCEDURE — 97112 NEUROMUSCULAR REEDUCATION: CPT

## 2020-03-13 NOTE — THERAPY TREATMENT NOTE
Outpatient Physical Therapy Ortho Treatment Note  [facility]     Patient Name: Ava Lagos  : 1955  MRN: [id number]  Today's Date: 3/13/2020      Visit Date: 2020    Visit Dx:    ICD-10-CM ICD-9-CM   1. Chronic pain of right knee M25.561 719.46    G89.29 338.29   2. Orthopedic aftercare Z47.89 V54.9   3. Difficulty walking R26.2 719.7   4. Weakness R53.1 780.79       Patient Active Problem List   Diagnosis   • Total knee replacement status        Past Medical History:   Diagnosis Date   • Arthritis    • COPD (chronic obstructive pulmonary disease) (CMS/HCC)    • Diabetes mellitus (CMS/[facility])    • Diverticulitis    • Heart murmur    • Neck pain    • Right knee pain         Past Surgical History:   Procedure Laterality Date   • BUNIONECTOMY Bilateral    •  SECTION     • COLONOSCOPY     • GALLBLADDER SURGERY     • KNEE ARTHROSCOPY Right    • OVARIAN CYST SURGERY     • TOTAL KNEE ARTHROPLASTY Right 2020    Procedure: TOTAL KNEE ARTHROPLASTY;  Surgeon: Robbin Jennings II, MD;  Location: [facility];  Service: Orthopedics;  Laterality: Right;   • UMBILICAL HERNIA REPAIR                         PT Assessment/Plan     Row Name 20 1529          PT Assessment    Assessment Comments  Pt. presents today with c/o of stiffness more so than pain. Able to complete full revolution on recumbent bike and and AROM 96 on wall slides (PROM 105). Pt. demonstrates improved quad control this session with tactile cue behind knee during quad set for proper activation. Pt. lacking 5 degrees of extension during supine quad set, lackking 10 degrees during LAQ.   -AJAY (r)  (t) AJAY (c)       User Key  (r) = Recorded By, (t) = Taken By, (c) = Cosigned By    Initials Name Provider Type    Ghassan Coe, PT Physical Therapist    Rand Sierra, KEE Student PT Student          Modalities     Row Name 20 1400             Ice    Ice Applied  Yes  -AJAY      Location  R knee, propped on  pillows  -CJ      Rx Minutes  10 mins  -CJ      Ice S/P Rx  Yes  -CJ        User Key  (r) = Recorded By, (t) = Taken By, (c) = Cosigned By    Initials Name Provider Type    AJAY Ghassan Oleary, PT Physical Therapist        OP Exercises     Row Name 03/13/20 1500 03/13/20 1400          Subjective Comments    Subjective Comments  --  I feel good today. Not really any pain just stiff. I did have to getup in the night and nice myknee because it was hurting,  -CJ (r) MH (t) CJ (c)        Subjective Pain    Able to rate subjective pain?  --  yes  -CJ (r) MH (t) CJ (c)     Pre-Treatment Pain Level  --  2  -CJ (r) MH (t) CJ (c)        Total Minutes    26442 - PT Therapeutic Exercise Minutes  --  20  -CJ (r) MH (t) CJ (c)     20039 -  PT Neuromuscular Reeducation Minutes  --  15  -CJ (r) MH (t) CJ (c)     31061 - PT Manual Therapy Minutes  10  -CJ (r) MH (t) CJ (c)  --        Exercise 1    Exercise Name 1  --  quad set with pillow under length of leg  -CJ (r) MH (t) CJ (c)     Cueing 1  --  Verbal  -CJ (r) MH (t) CJ (c)     Reps 1  --  15  -CJ (r) MH (t) CJ (c)     Time 1  --  5s  -CJ (r) MH (t) CJ (c)     Additional Comments  --  performed after manual  -CJ (r) MH (t) CJ (c)        Exercise 2    Exercise Name 2  --  Nustep  -CJ (r) MH (t) CJ (c)     Cueing 2  --  Verbal  -CJ (r) MH (t) CJ (c)     Time 2  --  5 min  -CJ (r) MH (t) CJ (c)     Additional Comments  --  UE/LE L5  -CJ (r) MH (t) CJ (c)        Exercise 3    Exercise Name 3  --  heel prop stretch  -CJ (r) MH (t) CJ (c)     Time 3  --  with gentle PA mobs  -CJ (r) MH (t) CJ (c)     Additional Comments  --  lacking 5 degrees with quad contraction  -CJ (r) MH (t) CJ (c)        Exercise 4    Exercise Name 4  --  knee flex stretch stairs  -CJ (r) ARIANNA (t) CJ (c)     Cueing 4  --  Verbal;Demo  -AJAY (r) ARIANNA (t) CJ (c)     Reps 4  --  5  -CJ (r) ARIANNA (t) CJ (c)     Time 4  --  20s  -CJ (r) ARIANNA (t) CJ (c)        Exercise 5    Exercise Name 5  --  Longsitting HS/gastroc stretch  -CJ  (r) MH (t) CJ (c)     Reps 5  --  4   -CJ (r) MH (t) CJ (c)     Time 5  --  20 sec  -CJ (r) MH (t) CJ (c)     Additional Comments  --  using green strap  -CJ (r) MH (t) CJ (c)        Exercise 6    Exercise Name 6  --  wall slides  -CJ (r) MH (t) CJ (c)     Reps 6  --  10  -CJ (r) MH (t) CJ (c)     Time 6  --  5 sec  -CJ (r) MH (t) CJ (c)     Additional Comments  --  AROM 96; PROM: 103  -CJ (r) MH (t) CJ (c)        Exercise 9    Exercise Name 9  --  SAQ  -CJ (r) MH (t) CJ (c)     Cueing 9  --  Verbal  -CJ (r) MH (t) CJ (c)     Reps 9  --  15  -CJ (r) MH (t) CJ (c)     Time 9  --  5 sec  -CJ (r) MH (t) CJ (c)     Additional Comments  --  add weight next session  -CJ        Exercise 10    Exercise Name 10  --  rec bike  -CJ (r) MH (t) CJ (c)     Cueing 10  --  Verbal;Demo  -CJ (r) MH (t) CJ (c)     Time 10  --  5 min total, seat 6  -CJ (r) MH (t) CJ (c)     Additional Comments  --  full revolution  -CJ (r) MH (t) CJ (c)        Exercise 11    Exercise Name 11  --  calf stretch  -CJ (r) MH (t) CJ (c)     Cueing 11  --  Verbal  -CJ (r) MH (t) CJ (c)     Reps 11  --  5  -CJ (r) MH (t) CJ (c)     Time 11  --  20 sec  -CJ (r) MH (t) CJ (c)        Exercise 12    Exercise Name 12  --  LAQ  -CJ (r) MH (t) CJ (c)     Reps 12  --  10  -CJ (r) MH (t) CJ (c)     Time 12  --  5 sec  -CJ (r) MH (t) CJ (c)     Additional Comments  --  add weight next session  -CJ        Exercise 13    Exercise Name 13  --  HS curl  -CJ (r) MH (t) CJ (c)     Cueing 13  --  Verbal;Demo  -CJ (r)  (t) CJ (c)     Reps 13  --  10  -CJ (r)  (t) CJ (c)     Additional Comments  --  2# standing  -CJ (r)  (t) CJ (c)        Exercise 14    Exercise Name 14  --  mini  squats  -CJ (r)  (t) CJ (c)     Cueing 14  --  Verbal;Tactile  -CJ (r)  (t) CJ (c)     Reps 14  --  15  -CJ (r)  (t) CJ (c)     Additional Comments  --  chair behind for visual cue  -CJ (r)  (t) CJ (c)        Exercise 15    Exercise Name 15  --  --  -CJ (r)  (t) CJ (c)       User Key   (r) = Recorded By, (t) = Taken By, (c) = Cosigned By    Initials Name Provider Type    Ghassan Coe, PT Physical Therapist    Rand Sierra, PT Student PT Student                      Manual Rx (last 36 hours)      Manual Treatments     Row Name 03/13/20 1500             Total Minutes    56814 - PT Manual Therapy Minutes  10  -CJ (r) MH (t) CJ (c)         Manual Rx 1    Manual Rx 1 Location  STM quads/distal hamstrings  -CJ (r) MH (t) CJ (c)      Manual Rx 1 Type  Flexion/extension mobs with knee extended over heel prop and slight knee flexion  -CJ (r) MH (t) CJ (c)        User Key  (r) = Recorded By, (t) = Taken By, (c) = Cosigned By    Initials Name Provider Type    Ghassan Coe, PT Physical Therapist    Rand Sierra, PT Student PT Student          PT OP Goals     Row Name 03/13/20 1500          PT Short Term Goals    STG Date to Achieve  03/25/20  -CJ (r) MH (t) CJ (c)     STG 1  The pt will demonstrate IND and compliant with initial HEP focused on R knee AROM.  -CJ (r) MH (t) CJ (c)     STG 1 Progress  Ongoing  -CJ (r) MH (t) CJ (c)     STG 2  The pt will demonstrate R knee AROM 0-120 for improved functional mobility and gait pattern.  -CJ (r) MH (t) CJ (c)     STG 2 Progress  Ongoing  -CJ (r) MH (t) CJ (c)     STG 3  The pt will ambulate with SOC and demonstrate near normal gait pattern/ heel strike.  -CJ (r) MH (t) CJ (c)     STG 3 Progress  Ongoing  -CJ (r) MH (t) CJ (c)     STG 4  The pt will perform 10 IND SLR without quad lag noted for improved quad activation.  -CJ (r) MH (t) CJ (c)     STG 4 Progress  Ongoing  -CJ (r) MH (t) CJ (c)        Long Term Goals    LTG Date to Achieve  05/03/20  -CJ (r) MH (t) CJ (c)     LTG 1  The pt will demonstrate IND and compliant with progressive HEP focused on IND condition management and return to PLOF.  -CJ (r) MH (t) CJ (c)     LTG 1 Progress  Ongoing  -CJ (r) MH (t) CJ (c)     LTG 2  The pt will resume reciprocal stair navigation for improved home  navigation.  -CJ (r) MH (t) CJ (c)     LTG 2 Progress  Ongoing  -CJ (r) MH (t) CJ (c)     LTG 3  The pt will score greater than or equal to 55% ability on the KOS to indicate imptoved perceived performance of ADLs.  -CJ (r) MH (t) CJ (c)     LTG 3 Progress  Ongoing  -CJ (r) MH (t) CJ (c)     LTG 4  The pt will resume walking over uneven ground in backyard without AD for return to recreational activity performance (caring for chickens, woodworking).  -CJ (r) MH (t) CJ (c)     LTG 4 Progress  Ongoing  -CJ (r) MH (t) CJ (c)     LTG 5  The pt will tolerate prolonged standing/ walking at least 30 min for improved community navigation and potential return to work activities.  -CJ (r) MH (t) CJ (c)     LTG 5 Progress  Ongoing  -CJ (r) MH (t) CJ (c)       User Key  (r) = Recorded By, (t) = Taken By, (c) = Cosigned By    Initials Name Provider Type    Ghassan Coe, PT Physical Therapist     Rand Mason PT Student PT Student          Therapy Education  Program: Reinforced  How Provided: Verbal, Demonstration  Provided to: Patient  Level of Understanding: Teach back education performed, Demonstrated              Time Calculation:   Start Time: 1435  Stop Time: 1530  Time Calculation (min): 55 min  PT Non-Billable Time (min): 10 min  Total Timed Code Minutes- PT: 45 minute(s)  Therapy Charges for Today     Code Description Service Date Service Provider Modifiers Qty    65396848792  PT NEUROMUSC RE EDUCATION EA 15 MIN 3/13/2020 Rand Mason PT Student GP 1    48750859658  PT THER PROC EA 15 MIN 3/13/2020 Rand Mason, PT Student GP 1    [id number]  PT MANUAL THERAPY EA 15 MIN 3/13/2020 Rand Mason, PT Student GP 1    [id number]  PT HOT OR COLD PACK TREAT [facility] 3/13/2020 Rand Mason, PT Student GP 1                    Rand Mason PT Student  3/13/2020

## 2020-03-24 ENCOUNTER — APPOINTMENT (OUTPATIENT)
Dept: PHYSICAL THERAPY | Facility: HOSPITAL | Age: 65
End: 2020-03-24

## 2020-03-26 ENCOUNTER — HOSPITAL ENCOUNTER (OUTPATIENT)
Dept: PHYSICAL THERAPY | Facility: HOSPITAL | Age: 65
Setting detail: THERAPIES SERIES
Discharge: HOME | End: 2020-03-26

## 2020-03-26 DIAGNOSIS — G89.29 CHRONIC PAIN OF RIGHT KNEE: Primary | ICD-10-CM

## 2020-03-26 DIAGNOSIS — R53.1 WEAKNESS: ICD-10-CM

## 2020-03-26 DIAGNOSIS — M25.561 CHRONIC PAIN OF RIGHT KNEE: Primary | ICD-10-CM

## 2020-03-26 DIAGNOSIS — R26.2 DIFFICULTY WALKING: ICD-10-CM

## 2020-03-26 DIAGNOSIS — Z47.89 ORTHOPEDIC AFTERCARE: ICD-10-CM

## 2020-03-26 PROCEDURE — 97110 THERAPEUTIC EXERCISES: CPT

## 2020-03-26 NOTE — THERAPY TREATMENT NOTE
Outpatient Physical Therapy Ortho Treatment Note  [facility]     Patient Name: Ava Lagos  : 1955  MRN: [id number]  Today's Date: 3/26/2020      Visit Date: 2020    Visit Dx:    ICD-10-CM ICD-9-CM   1. Chronic pain of right knee M25.561 719.46    G89.29 338.29   2. Orthopedic aftercare Z47.89 V54.9   3. Difficulty walking R26.2 719.7   4. Weakness R53.1 780.79       Patient Active Problem List   Diagnosis   • Total knee replacement status        Past Medical History:   Diagnosis Date   • Arthritis    • COPD (chronic obstructive pulmonary disease) (CMS/HCC)    • Diabetes mellitus (CMS/[facility])    • Diverticulitis    • Heart murmur    • Neck pain    • Right knee pain         Past Surgical History:   Procedure Laterality Date   • BUNIONECTOMY Bilateral    •  SECTION     • COLONOSCOPY     • GALLBLADDER SURGERY     • KNEE ARTHROSCOPY Right    • OVARIAN CYST SURGERY     • TOTAL KNEE ARTHROPLASTY Right 2020    Procedure: TOTAL KNEE ARTHROPLASTY;  Surgeon: Robbin Jennings II, MD;  Location: [facility];  Service: Orthopedics;  Laterality: Right;   • UMBILICAL HERNIA REPAIR                         PT Assessment/Plan     Row Name 20 1545          PT Assessment    Assessment Comments  Pt ambulating with no AD . Knee tight with manual. Added weightwith LAQ/SAQ  -WS       User Key  (r) = Recorded By, (t) = Taken By, (c) = Cosigned By    Initials Name Provider Type    Geraldo Tejeda PTA Physical Therapy Assistant            OP Exercises     Row Name 20 1519             Subjective Pain    Able to rate subjective pain?  yes  -WS      Pre-Treatment Pain Level  1  -WS         Total Minutes    16474 - PT Therapeutic Exercise Minutes  30  -WS         Exercise 1    Exercise Name 1  quad set with pillow under length of leg  -WS      Cueing 1  Verbal  -WS      Reps 1  15  -WS      Time 1  5s  -WS         Exercise 2    Exercise Name 2  Nustep  -WS      Cueing 2  Verbal   -WS      Time 2  5 min  -WS         Exercise 4    Exercise Name 4  knee flex stretch stairs  -WS      Cueing 4  Verbal;Demo  -WS      Reps 4  3  -WS      Time 4  20  -WS         Exercise 5    Exercise Name 5  Longsitting HS/gastroc stretch  -WS      Cueing 5  Verbal;Demo  -WS      Reps 5  3  -WS      Time 5  20  -WS      Additional Comments  green strap  -WS         Exercise 6    Exercise Name 6  wall slides  -WS      Reps 6  10  -WS      Additional Comments  passive 103  -WS         Exercise 9    Exercise Name 9  SAQ  -WS      Cueing 9  Verbal  -WS      Reps 9  15  -WS      Additional Comments  3#  -WS         Exercise 11    Exercise Name 11  calf stretch  -WS      Reps 11  3  -WS      Time 11  20 sec  -WS      Additional Comments  on stairs  -WS         Exercise 12    Exercise Name 12  LAQ  -WS      Reps 12  15  -WS      Additional Comments  3#  -WS         Exercise 13    Exercise Name 13  HS curl  -WS      Cueing 13  Verbal;Demo  -WS      Reps 13  15  -WS      Additional Comments  2#  -WS         Exercise 14    Exercise Name 14  mini  squats  -WS      Reps 14  15  -WS        User Key  (r) = Recorded By, (t) = Taken By, (c) = Cosigned By    Initials Name Provider Type    Geraldo Tejeda PTA Physical Therapy Assistant                       PT OP Goals     Row Name 03/26/20 1500          PT Short Term Goals    STG Date to Achieve  03/25/20  -WS     STG 1  The pt will demonstrate IND and compliant with initial HEP focused on R knee AROM.  -     STG 1 Progress  Met  -WS     STG 2  The pt will demonstrate R knee AROM 0-120 for improved functional mobility and gait pattern.  -     STG 2 Progress  Ongoing  -     STG 3  The pt will ambulate with SOC and demonstrate near normal gait pattern/ heel strike.  -WS     STG 3 Progress  Ongoing  -     STG 4  The pt will perform 10 IND SLR without quad lag noted for improved quad activation.  -     STG 4 Progress  Ongoing  -        Long Term Goals    LTG Date to  Achieve  05/03/20  -     LTG 1  The pt will demonstrate IND and compliant with progressive HEP focused on IND condition management and return to PLOF.  -     LTG 1 Progress  Ongoing  -     LTG 2  The pt will resume reciprocal stair navigation for improved home navigation.  -     LTG 2 Progress  Ongoing  -WS     LTG 3  The pt will score greater than or equal to 55% ability on the KOS to indicate imptoved perceived performance of ADLs.  -     LTG 3 Progress  Ongoing  -     LTG 4  The pt will resume walking over uneven ground in backyard without AD for return to recreational activity performance (caring for chickens, woodworking).  -     LTG 4 Progress  Ongoing  -     LTG 5  The pt will tolerate prolonged standing/ walking at least 30 min for improved community navigation and potential return to work activities.  -     LTG 5 Progress  Ongoing  -       User Key  (r) = Recorded By, (t) = Taken By, (c) = Cosigned By    Initials Name Provider Type    Geraldo Tejeda PTA Physical Therapy Assistant          Therapy Education  Given: HEP, Symptoms/condition management, Pain management, Edema management  Program: Reinforced  How Provided: Verbal  Provided to: Patient              Time Calculation:   Start Time: 1515  Stop Time: 1545  Time Calculation (min): 30 min  Therapy Charges for Today     Code Description Service Date Service Provider Modifiers Qty    89801654971 HC PT THER PROC EA 15 MIN 3/26/2020 Geraldo Epperson PTA GP 2                    Geraldo Epperson PTA  3/26/2020

## 2020-03-27 ENCOUNTER — APPOINTMENT (OUTPATIENT)
Dept: PHYSICAL THERAPY | Facility: HOSPITAL | Age: 65
End: 2020-03-27

## 2020-03-30 ENCOUNTER — HOSPITAL ENCOUNTER (OUTPATIENT)
Dept: PHYSICAL THERAPY | Facility: HOSPITAL | Age: 65
Setting detail: THERAPIES SERIES
Discharge: HOME | End: 2020-03-30

## 2020-03-30 DIAGNOSIS — Z47.89 ORTHOPEDIC AFTERCARE: ICD-10-CM

## 2020-03-30 DIAGNOSIS — G89.29 CHRONIC PAIN OF RIGHT KNEE: Primary | ICD-10-CM

## 2020-03-30 DIAGNOSIS — M25.561 CHRONIC PAIN OF RIGHT KNEE: Primary | ICD-10-CM

## 2020-03-30 DIAGNOSIS — R26.2 DIFFICULTY WALKING: ICD-10-CM

## 2020-03-30 DIAGNOSIS — R53.1 WEAKNESS: ICD-10-CM

## 2020-03-30 PROCEDURE — 97110 THERAPEUTIC EXERCISES: CPT

## 2020-03-30 NOTE — THERAPY TREATMENT NOTE
Outpatient Physical Therapy Ortho Treatment Note  [facility]     Patient Name: Ava Lagos  : 1955  MRN: [id number]  Today's Date: 3/30/2020      Visit Date: 2020    Visit Dx:    ICD-10-CM ICD-9-CM   1. Chronic pain of right knee M25.561 719.46    G89.29 338.29   2. Orthopedic aftercare Z47.89 V54.9   3. Difficulty walking R26.2 719.7   4. Weakness R53.1 780.79       Patient Active Problem List   Diagnosis   • Total knee replacement status        Past Medical History:   Diagnosis Date   • Arthritis    • COPD (chronic obstructive pulmonary disease) (CMS/HCC)    • Diabetes mellitus (CMS/[facility])    • Diverticulitis    • Heart murmur    • Neck pain    • Right knee pain         Past Surgical History:   Procedure Laterality Date   • BUNIONECTOMY Bilateral    •  SECTION     • COLONOSCOPY     • GALLBLADDER SURGERY     • KNEE ARTHROSCOPY Right    • OVARIAN CYST SURGERY     • TOTAL KNEE ARTHROPLASTY Right 2020    Procedure: TOTAL KNEE ARTHROPLASTY;  Surgeon: Robbin Jennings II, MD;  Location: [facility];  Service: Orthopedics;  Laterality: Right;   • UMBILICAL HERNIA REPAIR                         PT Assessment/Plan     Row Name 20 1828          PT Assessment    Assessment Comments  Knee rom continue to improve. Inreased weight with open chain exercises. Continue to encourage ice  -WS       User Key  (r) = Recorded By, (t) = Taken By, (c) = Cosigned By    Initials Name Provider Type    Geraldo Tejeda PTA Physical Therapy Assistant            OP Exercises     Row Name 20 1096             Subjective Comments    Subjective Comments  knee doing good  -WS         Subjective Pain    Able to rate subjective pain?  yes  -WS      Pre-Treatment Pain Level  1  -WS         Total Minutes    56490 - PT Therapeutic Exercise Minutes  30  -WS         Exercise 1    Exercise Name 1  quad set with pillow under length of leg  -WS      Cueing 1  Verbal  -WS      Reps 1  15  -WS       Time 1  5s  -WS         Exercise 2    Exercise Name 2  Nustep  -WS      Cueing 2  Verbal  -WS      Time 2  5 min  -WS         Exercise 3    Exercise Name 3  heel prop stretch  -WS      Time 3  with gentle PA mobs  -WS         Exercise 4    Exercise Name 4  knee flex stretch stairs  -WS      Cueing 4  Verbal;Demo  -WS      Reps 4  3  -WS      Time 4  20  -WS         Exercise 5    Exercise Name 5  Longsitting HS/gastroc stretch  -WS      Cueing 5  Verbal;Demo  -WS      Reps 5  3  -WS      Time 5  20  -WS      Additional Comments  green strap  -WS         Exercise 6    Exercise Name 6  wall slides  -WS      Reps 6  10  -WS      Time 6  5 sec  -WS         Exercise 9    Exercise Name 9  SAQ  -WS      Cueing 9  Verbal  -WS      Reps 9  15  -WS      Additional Comments  4#  -WS         Exercise 11    Exercise Name 11  calf stretch  -WS      Reps 11  3  -WS      Time 11  20 sec  -WS      Additional Comments  on stairs  -WS         Exercise 12    Exercise Name 12  LAQ  -WS      Reps 12  15  -WS      Additional Comments  4#  -WS         Exercise 13    Exercise Name 13  HS curl  -WS      Cueing 13  Verbal;Demo  -WS      Reps 13  15  -WS      Additional Comments  3#  -WS         Exercise 14    Exercise Name 14  mini  squats  -WS      Reps 14  15  -WS        User Key  (r) = Recorded By, (t) = Taken By, (c) = Cosigned By    Initials Name Provider Type    Geraldo Tejeda PTA Physical Therapy Assistant                       PT OP Goals     Row Name 03/30/20 1800          PT Short Term Goals    STG Date to Achieve  03/25/20  -WS     STG 1  The pt will demonstrate IND and compliant with initial HEP focused on R knee AROM.  -WS     STG 1 Progress  Met  -WS     STG 2  The pt will demonstrate R knee AROM 0-120 for improved functional mobility and gait pattern.  -WS     STG 2 Progress  Ongoing  -WS     STG 3  The pt will ambulate with SOC and demonstrate near normal gait pattern/ heel strike.  -WS     STG 3 Progress  Ongoing   -     STG 4  The pt will perform 10 IND SLR without quad lag noted for improved quad activation.  -     STG 4 Progress  Ongoing  -        Long Term Goals    LTG Date to Achieve  05/03/20  -     LTG 1  The pt will demonstrate IND and compliant with progressive HEP focused on IND condition management and return to PLOF.  -     LTG 1 Progress  Ongoing  -     LTG 2  The pt will resume reciprocal stair navigation for improved home navigation.  -     LTG 2 Progress  Ongoing  -     LTG 3  The pt will score greater than or equal to 55% ability on the KOS to indicate imptoved perceived performance of ADLs.  -     LTG 3 Progress  Ongoing  -     LTG 4  The pt will resume walking over uneven ground in backyard without AD for return to recreational activity performance (caring for chickens, woodworking).  -     LTG 4 Progress  Ongoing  -     LTG 5  The pt will tolerate prolonged standing/ walking at least 30 min for improved community navigation and potential return to work activities.  -     LTG 5 Progress  Ongoing  -       User Key  (r) = Recorded By, (t) = Taken By, (c) = Cosigned By    Initials Name Provider Type    Geraldo Tejeda PTA Physical Therapy Assistant          Therapy Education  Given: HEP, Symptoms/condition management, Pain management, Edema management, Mobility training  Program: Reinforced  How Provided: Verbal  Provided to: Patient              Time Calculation:   Start Time: 1745  Stop Time: 1815  Time Calculation (min): 30 min  Therapy Charges for Today     Code Description Service Date Service Provider Modifiers Qty    65204112988 HC PT THER PROC EA 15 MIN 3/30/2020 Geraldo Epperson PTA GP 2                    Geraldo Epperson PTA  3/30/2020

## 2020-04-01 ENCOUNTER — HOSPITAL ENCOUNTER (OUTPATIENT)
Dept: PHYSICAL THERAPY | Facility: HOSPITAL | Age: 65
Setting detail: THERAPIES SERIES
Discharge: HOME | End: 2020-04-01

## 2020-04-01 DIAGNOSIS — R53.1 WEAKNESS: ICD-10-CM

## 2020-04-01 DIAGNOSIS — R26.2 DIFFICULTY WALKING: ICD-10-CM

## 2020-04-01 DIAGNOSIS — G89.29 CHRONIC PAIN OF RIGHT KNEE: Primary | ICD-10-CM

## 2020-04-01 DIAGNOSIS — M25.561 CHRONIC PAIN OF RIGHT KNEE: Primary | ICD-10-CM

## 2020-04-01 DIAGNOSIS — Z47.89 ORTHOPEDIC AFTERCARE: ICD-10-CM

## 2020-04-01 NOTE — THERAPY TREATMENT NOTE
Outpatient Physical Therapy Ortho Treatment Note  [facility]     Patient Name: Ava Lagos  : 1955  MRN: [id number]  Today's Date: 2020      Visit Date: 2020    Visit Dx:    ICD-10-CM ICD-9-CM   1. Chronic pain of right knee M25.561 719.46    G89.29 338.29   2. Orthopedic aftercare Z47.89 V54.9   3. Difficulty walking R26.2 719.7   4. Weakness R53.1 780.79       Patient Active Problem List   Diagnosis   • Total knee replacement status        Past Medical History:   Diagnosis Date   • Arthritis    • COPD (chronic obstructive pulmonary disease) (CMS/[facility])    • Diabetes mellitus (CMS/[facility])    • Diverticulitis    • Heart murmur    • Neck pain    • Right knee pain         Past Surgical History:   Procedure Laterality Date   • BUNIONECTOMY Bilateral    •  SECTION     • COLONOSCOPY     • GALLBLADDER SURGERY     • KNEE ARTHROSCOPY Right    • OVARIAN CYST SURGERY     • TOTAL KNEE ARTHROPLASTY Right 2020    Procedure: TOTAL KNEE ARTHROPLASTY;  Surgeon: Robbin Jennings II, MD;  Location: [facility];  Service: Orthopedics;  Laterality: Right;   • UMBILICAL HERNIA REPAIR                         PT Assessment/Plan     Row Name 20 1737          PT Assessment    Assessment Comments  Ms. Lagos is 7 weeks s/p R TKA (with long hx of knee pain prior to surgery).  She demonstrates inability to obtain full knee extension actively but can nearly achieve it passively, angela noted wth LAQ/SAQ (weighted or unweighted). She had a difficult time with volitional quad contraction and will work on this at home.  We worked on her gait pattern today, with focus on knee flexion through swing and inital contact with heel.  Ms. Lagos is progressing toward functional goals and remains a good candidate for skilled physical therapy.   -GJ        PT Plan    PT Plan Comments  assess popping in knee with step ups/downs.  Assess quad contraction quality, assess ability to achieve full  AROM knee ext  -GJ       User Key  (r) = Recorded By, (t) = Taken By, (c) = Cosigned By    Initials Name Provider Type    GJ Parminder Oleary, PT Physical Therapist            OP Exercises     Row Name 04/01/20 1630 04/01/20 1600          Subjective Comments    Subjective Comments  --  I'm doing pretty good.    -GJ        Total Minutes    93287 - Gait Training Minutes   10  -GJ  --     08628 - PT Therapeutic Exercise Minutes  35  -GJ  --        Exercise 1    Exercise Name 1  --  QS, verbal, tactile, demonstration for good volitional contraction of quad, in supine and sitting EOB  -GJ     Cueing 1  --  Verbal  -GJ     Reps 1  --  15  -GJ     Time 1  --  5s  -GJ        Exercise 2    Exercise Name 2  --  Nustep  -GJ     Cueing 2  --  Verbal  -GJ     Time 2  --  5 min  -GJ        Exercise 3    Exercise Name 3  --  heel prop stretch  -GJ     Reps 3  --  1 min  -GJ        Exercise 4    Exercise Name 4  --  knee flex stretch stairs  -GJ     Cueing 4  --  Verbal;Demo  -GJ     Reps 4  --  5  -GJ     Time 4  --  20 s  -GJ        Exercise 5    Exercise Name 5  --  Longsitting HS/gastroc stretch  -GJ     Cueing 5  --  Verbal;Demo  -GJ     Reps 5  --  3  -GJ     Time 5  --  20  -GJ     Additional Comments  --  strep  -GJ        Exercise 7    Exercise Name 7  --  step up  -GJ     Cueing 7  --  Verbal;Demo  -GJ     Reps 7  --  12  -GJ     Additional Comments  --  6 inch step, noted popping with step down phase, monitor  -GJ        Exercise 9    Exercise Name 9  --  SAQ  -GJ     Cueing 9  --  Verbal  -GJ     Reps 9  --  15  -GJ     Additional Comments  --  5#, noted inability to achieve full AROM, able to passively   -GJ        Exercise 11    Exercise Name 11  --  calf stretch  -GJ     Cueing 11  --  Verbal  -GJ     Reps 11  --  3  -GJ     Time 11  --  20 sec  -GJ     Additional Comments  --  stairs  -GJ        Exercise 12    Exercise Name 12  --  LAQ  -GJ     Cueing 12  --  Verbal;Demo  -GJ     Reps 12  --  15  -GJ     Additional  Comments  --  5#, noted inability to achieve full AROM, able to passively   -GJ        Exercise 13    Exercise Name 13  --  HS curl  -GJ     Cueing 13  --  Verbal;Demo  -GJ     Reps 13  --  15  -GJ     Additional Comments  --  3#  -GJ        Exercise 14    Exercise Name 14  --  mini  squats  -GJ     Reps 14  --  15  -GJ        Exercise 15    Exercise Name 15  --  gait, forward/retro, exagerated knee flexion/inital contact with heel, arm swing  -GJ     Time 15  --  10 min  -GJ     Additional Comments  --  worked on heel strike, arm swing  -GJ       User Key  (r) = Recorded By, (t) = Taken By, (c) = Cosigned By    Initials Name Provider Type    GJ Parminder Oleary, PT Physical Therapist                       PT OP Goals     Row Name 04/01/20 1600          PT Short Term Goals    STG Date to Achieve  03/25/20  -GJ     STG 1  The pt will demonstrate IND and compliant with initial HEP focused on R knee AROM.  -GJ     STG 1 Progress  Met  -GJ     STG 2  The pt will demonstrate R knee AROM 0-120 for improved functional mobility and gait pattern.  -GJ     STG 2 Progress  Ongoing  -GJ     STG 2 Progress Comments  grossly lacks 15 degrees of active extension on R  -GJ     STG 3  The pt will ambulate with SOC and demonstrate near normal gait pattern/ heel strike.  -GJ     STG 3 Progress  Partially Met  -GJ     STG 3 Progress Comments  no cane, mild limp  -GJ     STG 4  The pt will perform 10 IND SLR without quad lag noted for improved quad activation.  -GJ     STG 4 Progress  Ongoing  -GJ     STG 4 Progress Comments  noted grossly 15 degree extensor lag  -GJ        Long Term Goals    LTG Date to Achieve  05/03/20  -GJ     LTG 1  The pt will demonstrate IND and compliant with progressive HEP focused on IND condition management and return to PLOF.  -GJ     LTG 1 Progress  Ongoing  -GJ     LTG 2  The pt will resume reciprocal stair navigation for improved home navigation.  -GJ     LTG 2 Progress  Ongoing  -GJ     LTG 3  The pt will  score greater than or equal to 55% ability on the KOS to indicate imptoved perceived performance of ADLs.  -GJ     LTG 3 Progress  Ongoing  -GJ     LTG 4  The pt will resume walking over uneven ground in backyard without AD for return to recreational activity performance (caring for chickens, woodworking).  -GJ     LTG 4 Progress  Ongoing  -GJ     LTG 5  The pt will tolerate prolonged standing/ walking at least 30 min for improved community navigation and potential return to work activities.  -GJ     LTG 5 Progress  Ongoing  -GJ       User Key  (r) = Recorded By, (t) = Taken By, (c) = Cosigned By    Initials Name Provider Type    Parminder Ziegler, PT Physical Therapist          Therapy Education  Education Details: encoruaged her to start walking at home, worked on heel strike and knee flexion through swing to initial contact with focus on concentration on these parts of gait. [place] to work on volitional contraction of quad like a quad set  Given: HEP, Symptoms/condition management, Pain management, Mobility training, Posture/body mechanics  Program: New, Reinforced, Progressed, Modified  How Provided: Verbal, Demonstration  Provided to: Patient  Level of Understanding: Teach back education performed, Demonstrated, Verbalized              Time Calculation:   Start Time: 1630  Stop Time: 1720  Time Calculation (min): 50 min                Parminder Oleary, PT  4/1/2020

## 2020-04-06 ENCOUNTER — HOSPITAL ENCOUNTER (OUTPATIENT)
Dept: PHYSICAL THERAPY | Facility: HOSPITAL | Age: 65
Setting detail: THERAPIES SERIES
Discharge: HOME | End: 2020-04-06

## 2020-04-06 DIAGNOSIS — Z47.89 ORTHOPEDIC AFTERCARE: ICD-10-CM

## 2020-04-06 DIAGNOSIS — R26.2 DIFFICULTY WALKING: ICD-10-CM

## 2020-04-06 DIAGNOSIS — M25.561 CHRONIC PAIN OF RIGHT KNEE: Primary | ICD-10-CM

## 2020-04-06 DIAGNOSIS — R53.1 WEAKNESS: ICD-10-CM

## 2020-04-06 DIAGNOSIS — G89.29 CHRONIC PAIN OF RIGHT KNEE: Primary | ICD-10-CM

## 2020-04-06 PROCEDURE — 97116 GAIT TRAINING THERAPY: CPT

## 2020-04-06 PROCEDURE — 97110 THERAPEUTIC EXERCISES: CPT

## 2020-04-06 NOTE — THERAPY PROGRESS REPORT/RE-CERT
Outpatient Physical Therapy Ortho Progress Note  [facility]     Patient Name: Ava Lagos  : 1955  MRN: [id number]  Today's Date: 2020      Visit Date: 2020    Visit Dx:    ICD-10-CM ICD-9-CM   1. Chronic pain of right knee M25.561 719.46    G89.29 338.29   2. Orthopedic aftercare Z47.89 V54.9   3. Difficulty walking R26.2 719.7   4. Weakness R53.1 780.79       Patient Active Problem List   Diagnosis   • Total knee replacement status        Past Medical History:   Diagnosis Date   • Arthritis    • COPD (chronic obstructive pulmonary disease) (CMS/HCC)    • Diabetes mellitus (CMS/HCC)    • Diverticulitis    • Heart murmur    • Neck pain    • Right knee pain         Past Surgical History:   Procedure Laterality Date   • BUNIONECTOMY Bilateral    •  SECTION     • COLONOSCOPY     • GALLBLADDER SURGERY     • KNEE ARTHROSCOPY Right    • OVARIAN CYST SURGERY     • TOTAL KNEE ARTHROPLASTY Right 2020    Procedure: TOTAL KNEE ARTHROPLASTY;  Surgeon: Robbin Jennings II, MD;  Location: [facility];  Service: Orthopedics;  Laterality: Right;   • UMBILICAL HERNIA REPAIR         PT Ortho     Row Name 20 1600       Right Lower Ext    Rt Knee Extension/Flexion AROM  lacks 8-107  -RS    Rt Knee Extension/Flexion PROM  0-110  -RS    RT Lower Extremity Comments  quad lag approx 8-10 degrees  -RS       MMT Right Lower Ext    Rt Hip Flexion MMT, Gross Movement  (4+/5) good plus  -RS    Rt Hip ABduction MMT, Gross Movement  (4/5) good  -RS    Rt Knee Extension MMT, Gross Movement  (3/5) fair  -RS    Rt Knee Flexion MMT, Gross Movement  (4-/5) good minus  -RS    Rt Ankle Plantarflexion MMT, Gross Movement  (4/5) good  -RS    Rt Ankle Dorsiflexion MMT, Gross Movement  (4/5) good  -RS      User Key  (r) = Recorded By, (t) = Taken By, (c) = Cosigned By    Initials Name Provider Type    Yue Almonte, PT Physical Therapist                      PT Assessment/Plan     Row Name  04/06/20 1600          PT Assessment    Functional Limitations  Decreased safety during functional activities;Impaired gait;Limitation in home management;Limitations in community activities;Limitations in functional capacity and performance;Performance in leisure activities;Performance in work activities  -RS     Impairments  Cognition;Endurance;Gait;Range of motion;Poor body mechanics;Pain;Muscle strength;Joint mobility  -RS     Assessment Comments  Ms. Lagos is approx 7 weeks s/p R TKA, she is progressing toward both short and long term goals however is limited by lack of full quad strength. She demonstrates full passive R knee extension however demonstrates 7-8 degree quad lag during LAQ and SLR. She reports fair to good compliance with HEP, reviewed frrquency of stretching/ QS. Added TKE to home program, emphasized importance of gait pattern, pt with improved pattern at end of session regarding terinal stance and knee flexion pre swing. She continnues to be a good candidate for skilled PT.  -RS     Rehab Potential  Good  -RS     Patient/caregiver participated in establishment of treatment plan and goals  Yes  -RS     Patient would benefit from skilled therapy intervention  Yes  -RS        PT Plan    PT Plan Comments  Continue 2x per week focused on improved knee AROM and functional strength and gait pattern.  -RS       User Key  (r) = Recorded By, (t) = Taken By, (c) = Cosigned By    Initials Name Provider Type    RS Yue Larios, PT Physical Therapist            OP Exercises     Row Name 04/06/20 1600             Subjective Comments    Subjective Comments  Feeling pretty good, thinks part of the problem is habit. pain is low. Still having popping with steps.  -RS         Subjective Pain    Able to rate subjective pain?  yes  -RS      Pre-Treatment Pain Level  1  -RS         Total Minutes    48453 - Gait Training Minutes   8  -RS      30496 - PT Therapeutic Exercise Minutes  32  -RS         Exercise 1     Exercise Name 1  QS, verbal, tactile, demonstration for good volitional contraction of quad, in supine and sitting EOB  -RS      Cueing 1  Verbal  -RS      Reps 1  15  -RS      Time 1  5s  -RS      Additional Comments  improved volitional contraction  -RS         Exercise 2    Exercise Name 2  Nustep  -RS      Cueing 2  Verbal  -RS      Time 2  4 min  -RS         Exercise 3    Exercise Name 3  heel prop stretch  -RS      Reps 3  --  -RS      Additional Comments  home  -RS         Exercise 4    Exercise Name 4  knee flex stretch stairs  -RS      Cueing 4  Verbal;Demo  -RS      Reps 4  5  -RS      Time 4  20 s  -RS         Exercise 5    Exercise Name 5  Longsitting HS/gastroc stretch  -RS      Cueing 5  Verbal;Demo  -RS      Reps 5  3  -RS      Time 5  20  -RS         Exercise 6    Exercise Name 6  rec bike  -RS      Cueing 6  Verbal  -RS      Time 6  4 min  -RS         Exercise 7    Exercise Name 7  step up  -RS      Cueing 7  Verbal;Demo  -RS      Reps 7  12  -RS      Additional Comments  6 inch step, noted popping with step down phase, monitor  -RS         Exercise 9    Exercise Name 9  SAQ  -RS      Cueing 9  Verbal  -RS      Reps 9  15  -RS      Additional Comments  5#, noted inability to achieve full AROM, able to passively   -RS         Exercise 11    Exercise Name 11  calf stretch  -RS      Cueing 11  Verbal  -RS      Reps 11  3  -RS      Time 11  20 sec  -RS         Exercise 12    Exercise Name 12  LAQ  -RS      Cueing 12  Verbal;Demo  -RS      Reps 12  15  -RS      Additional Comments  5#- lacks TKE actively  -RS         Exercise 13    Exercise Name 13  HS curl  -RS      Cueing 13  Verbal;Demo  -RS      Reps 13  15  -RS      Additional Comments  3#  -RS         Exercise 14    Exercise Name 14  mini  squats  -RS      Reps 14  15  -RS         Exercise 15    Exercise Name 15  gait, forward/retro, exagerated knee flexion/inital contact with heel, arm swing  -RS      Time 15  6 min  -RS      Additional  Comments  cue for heel strike, toes forward, equal weight shift  -RS         Exercise 16    Exercise Name 16  cynthia cynthia  -RS      Cueing 16  Verbal;Demo  -RS      Time 16  2 min  -RS         Exercise 17    Exercise Name 17  TKE band  -RS      Cueing 17  Verbal  -RS      Reps 17  15  -RS      Time 17  added to HEP  -RS      Additional Comments  GTB  -RS        User Key  (r) = Recorded By, (t) = Taken By, (c) = Cosigned By    Initials Name Provider Type    RS Yue Larios, PT Physical Therapist                       PT OP Goals     Row Name 04/06/20 1600          PT Short Term Goals    STG Date to Achieve  03/25/20  -RS     STG 1  The pt will demonstrate IND and compliant with initial HEP focused on R knee AROM.  -RS     STG 1 Progress  Met  -RS     STG 2  The pt will demonstrate R knee AROM 0-120 for improved functional mobility and gait pattern.  -RS     STG 2 Progress  Progressing  -RS     STG 2 Progress Comments  continnues to be limited in TKE and flexion- see objective  -RS     STG 3  The pt will ambulate with SPC and demonstrate near normal gait pattern/ heel strike.  -RS     STG 3 Progress  Partially Met  -RS     STG 3 Progress Comments  ambulates without APC- cues for heel strike and knee flexion as pt tends to abulate with knee held in near extesion  -RS     STG 4  The pt will perform 10 IND SLR without quad lag noted for improved quad activation.  -RS     STG 4 Progress  Progressing;Partially Met  -RS     STG 4 Progress Comments  mild quad lag noted, however improving  -RS        Long Term Goals    LTG Date to Achieve  05/03/20  -RS     LTG 1  The pt will demonstrate IND and compliant with progressive HEP focused on IND condition management and return to PLOF.  -RS     LTG 1 Progress  Progressing  -RS     LTG 2  The pt will resume reciprocal stair navigation for improved home navigation.  -RS     LTG 2 Progress  Ongoing  -RS     LTG 2 Progress Comments  step to pattern  -RS     LTG 3  The pt will score  greater than or equal to 55% ability on the KOS to indicate imptoved perceived performance of ADLs.  -RS     LTG 3 Progress  Met  -RS     LTG 3 Progress Comments  57% ability  -RS     LTG 4  The pt will resume walking over uneven ground in backyard without AD for return to recreational activity performance (caring for chickens, woodworking).  -RS     LTG 4 Progress  Ongoing  -RS     LTG 4 Progress Comments  does not use Ad however continues to require frequent cues for pattern over even ground  -RS     LTG 5  The pt will tolerate prolonged standing/ walking at least 30 min for improved community navigation and potential return to work activities.  -RS     LTG 5 Progress  Progressing  -RS       User Key  (r) = Recorded By, (t) = Taken By, (c) = Cosigned By    Initials Name Provider Type    RS Yue Larios PT Physical Therapist               Outcome Measure Options: Knee Outcome Score- ADL  Knee Outcome Score  Knee Outcome Score Comments: 57% ability      Time Calculation:   Start Time: 1615  Stop Time: 1700  Time Calculation (min): 45 min  Therapy Charges for Today     Code Description Service Date Service Provider Modifiers Qty    59506193018 HC PT THER PROC EA 15 MIN 4/6/2020 Yue Larios, PT GP 2    36867618169 HC GAIT TRAINING EA 15 MIN 4/6/2020 Yue Larios, PT GP 1          PT G-Codes  Outcome Measure Options: Knee Outcome Score- ADL         Yue Larios PT  4/6/2020

## 2020-04-08 ENCOUNTER — HOSPITAL ENCOUNTER (OUTPATIENT)
Dept: PHYSICAL THERAPY | Facility: HOSPITAL | Age: 65
Setting detail: THERAPIES SERIES
Discharge: HOME | End: 2020-04-08

## 2020-04-08 DIAGNOSIS — Z47.89 ORTHOPEDIC AFTERCARE: ICD-10-CM

## 2020-04-08 DIAGNOSIS — R53.1 WEAKNESS: ICD-10-CM

## 2020-04-08 DIAGNOSIS — R26.2 DIFFICULTY WALKING: ICD-10-CM

## 2020-04-08 DIAGNOSIS — G89.29 CHRONIC PAIN OF RIGHT KNEE: Primary | ICD-10-CM

## 2020-04-08 DIAGNOSIS — M25.561 CHRONIC PAIN OF RIGHT KNEE: Primary | ICD-10-CM

## 2020-04-08 PROCEDURE — 97110 THERAPEUTIC EXERCISES: CPT

## 2020-04-08 NOTE — THERAPY TREATMENT NOTE
"    Outpatient Physical Therapy Ortho Treatment Note  [facility]     Patient Name: Ava Lagos  : 1955  MRN: [id number]  Today's Date: 2020      Visit Date: 2020    Visit Dx:    ICD-10-CM ICD-9-CM   1. Chronic pain of right knee M25.561 719.46    G89.29 338.29   2. Orthopedic aftercare Z47.89 V54.9   3. Difficulty walking R26.2 719.7   4. Weakness R53.1 780.79       Patient Active Problem List   Diagnosis   • Total knee replacement status        Past Medical History:   Diagnosis Date   • Arthritis    • COPD (chronic obstructive pulmonary disease) (CMS/[facility])    • Diabetes mellitus (CMS/[facility])    • Diverticulitis    • Heart murmur    • Neck pain    • Right knee pain         Past Surgical History:   Procedure Laterality Date   • BUNIONECTOMY Bilateral    •  SECTION     • COLONOSCOPY     • GALLBLADDER SURGERY     • KNEE ARTHROSCOPY Right    • OVARIAN CYST SURGERY     • TOTAL KNEE ARTHROPLASTY Right 2020    Procedure: TOTAL KNEE ARTHROPLASTY;  Surgeon: Robbin Jennings II, MD;  Location: [facility];  Service: Orthopedics;  Laterality: Right;   • UMBILICAL HERNIA REPAIR                         PT Assessment/Plan     Row Name 20 1600          PT Assessment    Assessment Comments  Pt reports stiffness and tightness with flexion.  Quad weakness is hindering AROM extension and functional mobility, gait and in particular stairs.  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  Pt will benefit from continuing skilled therapy services to regain functional ROM, strength and mobility.  She has begun walking more at home.  -ADRIÁN        PT Plan    PT Plan Comments  cont with patellar mobs superiorly angela, knee flexion stretching,  quad strengthening, and step ups (may consider lateral step up from 2-3\" height like ream of paper)  -ADRIÁN       User Key  (r) = Recorded By, (t) = Taken By, (c) = Cosigned By    Initials Name Provider Type    Liudmila Rascon " "N, PT Physical Therapist            OP Exercises     Row Name 04/08/20 1600             Subjective Comments    Subjective Comments  Just guarded anticipating the pain but then it does okay.  -JA         Subjective Pain    Able to rate subjective pain?  yes  -JA      Pre-Treatment Pain Level  1  -JA         Total Minutes    78390 - PT Therapeutic Exercise Minutes  40  -JA         Exercise 1    Exercise Name 1  QS, verbal, tactile, demonstration for good volitional contraction of quad, in supine and sitting EOB  -JA      Cueing 1  Verbal  -JA      Reps 1  15  -JA      Time 1  5s  -JA         Exercise 2    Exercise Name 2  Nustep  -JA      Cueing 2  Verbal  -JA      Time 2  4 min  -JA         Exercise 3    Exercise Name 3  heel prop stretch  -JA         Exercise 4    Exercise Name 4  knee flex stretch stairs  -JA      Cueing 4  Verbal;Demo  -JA      Reps 4  5  -JA      Time 4  20 s  -JA         Exercise 5    Exercise Name 5  Longsitting HS/gastroc stretch  -JA      Cueing 5  Verbal;Demo  -JA      Reps 5  3  -JA      Time 5  20  -JA         Exercise 6    Exercise Name 6  rec bike  -JA      Cueing 6  Verbal  -JA      Time 6  4 min  -JA         Exercise 7    Exercise Name 7  step up  -JA      Cueing 7  Verbal;Demo  -JA      Reps 7  15  -JA      Additional Comments  6\"  -JA         Exercise 9    Exercise Name 9  SAQ  -JA      Cueing 9  Verbal  -JA      Reps 9  15  -JA      Additional Comments  5#  -JA         Exercise 10    Exercise Name 10  patellar mobs, distal ham and quad STM  -JA      Time 10  5 min  -JA      Additional Comments  had pt perform QS while assisting patella mobility superiorly, pt unalbe to do well on her own due to tension on hams in long-sitting  -JA         Exercise 11    Exercise Name 11  calf stretch  -JA      Cueing 11  Verbal  -JA      Reps 11  3  -JA      Time 11  20 sec  -JA         Exercise 12    Exercise Name 12  LAQ  -JA      Cueing 12  Verbal;Demo  -JA      Reps 12  15  -JA         Exercise " 13    Exercise Name 13  HS curl  -JA      Cueing 13  Verbal;Demo  -JA      Reps 13  15  -JA      Additional Comments  3#  -JA         Exercise 14    Exercise Name 14  mini  squats  -JA      Reps 14  15  -JA         Exercise 15    Exercise Name 15  gait, heel strike, TKE  -JA      Time 15  4 min  -JA         Exercise 16    Exercise Name 16  cynthia cynthia  -JA      Cueing 16  --  -JA      Time 16  --  -JA         Exercise 17    Exercise Name 17  TKE band  -JA      Cueing 17  Verbal  -JA      Reps 17  15  -JA      Time 17  --  -JA      Additional Comments  GTB  -JA         Exercise 18    Exercise Name 18  passive knee flexion stretch  -JA      Reps 18  3  -JA      Time 18  20sec  -JA        User Key  (r) = Recorded By, (t) = Taken By, (c) = Cosigned By    Initials Name Provider Type    Liudmila Rascon, PT Physical Therapist                           Therapy Education  Education Details: Educated pt on patellar mobs and worked to assist superior glide during quad set, she had difficulty due to tension on hamstrings in long-sitting.  Given: HEP, Symptoms/condition management, Pain management, Posture/body mechanics, Mobility training  Program: Reinforced, Progressed  How Provided: Verbal, Demonstration  Provided to: Patient  Level of Understanding: Teach back education performed              Time Calculation:   Start Time: 1615  Stop Time: 1655  Time Calculation (min): 40 min  Therapy Charges for Today     Code Description Service Date Service Provider Modifiers Qty    16653449851 HC PT THER PROC EA 15 MIN 4/8/2020 Liudmila Hardin PT GP 3                    Liudmila Hardin PT  4/8/2020

## 2020-04-13 ENCOUNTER — HOSPITAL ENCOUNTER (OUTPATIENT)
Dept: PHYSICAL THERAPY | Facility: HOSPITAL | Age: 65
Setting detail: THERAPIES SERIES
Discharge: HOME | End: 2020-04-13

## 2020-04-13 DIAGNOSIS — R26.2 DIFFICULTY WALKING: ICD-10-CM

## 2020-04-13 DIAGNOSIS — G89.29 CHRONIC PAIN OF RIGHT KNEE: Primary | ICD-10-CM

## 2020-04-13 DIAGNOSIS — M25.561 CHRONIC PAIN OF RIGHT KNEE: Primary | ICD-10-CM

## 2020-04-13 DIAGNOSIS — Z47.89 ORTHOPEDIC AFTERCARE: ICD-10-CM

## 2020-04-13 DIAGNOSIS — R53.1 WEAKNESS: ICD-10-CM

## 2020-04-13 PROCEDURE — 97140 MANUAL THERAPY 1/> REGIONS: CPT | Performed by: PHYSICAL THERAPIST

## 2020-04-13 PROCEDURE — 97110 THERAPEUTIC EXERCISES: CPT | Performed by: PHYSICAL THERAPIST

## 2020-04-13 NOTE — THERAPY TREATMENT NOTE
Outpatient Physical Therapy Ortho Treatment Note  [facility]     Patient Name: Ava Lagos  : 1955  MRN: [id number]  Today's Date: 2020      Visit Date: 2020    Visit Dx:    ICD-10-CM ICD-9-CM   1. Chronic pain of right knee M25.561 719.46    G89.29 338.29   2. Orthopedic aftercare Z47.89 V54.9   3. Difficulty walking R26.2 719.7   4. Weakness R53.1 780.79       Patient Active Problem List   Diagnosis   • Total knee replacement status        Past Medical History:   Diagnosis Date   • Arthritis    • COPD (chronic obstructive pulmonary disease) (CMS/[facility])    • Diabetes mellitus (CMS/[facility])    • Diverticulitis    • Heart murmur    • Neck pain    • Right knee pain         Past Surgical History:   Procedure Laterality Date   • BUNIONECTOMY Bilateral    •  SECTION     • COLONOSCOPY     • GALLBLADDER SURGERY     • KNEE ARTHROSCOPY Right    • OVARIAN CYST SURGERY     • TOTAL KNEE ARTHROPLASTY Right 2020    Procedure: TOTAL KNEE ARTHROPLASTY;  Surgeon: Robbin Jennings II, MD;  Location: [facility];  Service: Orthopedics;  Laterality: Right;   • UMBILICAL HERNIA REPAIR                         PT Assessment/Plan     Row Name 20 1733          PT Assessment    Assessment Comments  Ms. Lagos is ~ 8 weeks s/p R TKA. She ambulates with improvinig gait pattern without AD. she demonstrates decreased popping of R knee/patella during 6 inch step up.  She demonstrates improved R quad contraction quality and noted decrease in fatigue/fasiculations.  She continnues to demonstrate extensor lag with SLR (~ 5 degrees) and continues to lack full TKE with LAQ/SAQ. Ms. Lagos is progressing toward all functional goals at this time and remains a good candidate for skilled physical therapy.   -GJ        PT Plan    PT Plan Comments  patellar mobs, quad strengthening,   -GJ       User Key  (r) = Recorded By, (t) = Taken By, (c) = Cosigned By    Initials Name Provider Type     Parminder Ziegler, PT Physical Therapist            OP Exercises     Row Name 04/13/20 1649 04/13/20 1600          Subjective Comments    Subjective Comments  --  I've been working on stretching my knee a lot. I get sore after exercise.   -GJ        Subjective Pain    Pre-Treatment Pain Level  --  1  -GJ        Total Minutes    54424 - PT Therapeutic Exercise Minutes  20  -GJ  --     30858 - PT Manual Therapy Minutes  10  -GJ  --        Exercise 1    Exercise Name 1  --  QS, verbal, tactile, demonstration for good volitional contraction of quad, in supine and sitting EOB  -GJ     Cueing 1  --  Verbal  -GJ     Reps 1  --  15  -GJ     Time 1  --  5s  -GJ        Exercise 2    Exercise Name 2  --  Nustep  -GJ     Time 2  --  4 min  -GJ        Exercise 7    Exercise Name 7  --  step up  -GJ     Cueing 7  --  Verbal;Demo  -GJ     Reps 7  --  15  -GJ     Additional Comments  --  6 inch, very minmal popping today  -GJ        Exercise 8    Exercise Name 8  --  lateral stepping  -GJ     Cueing 8  --  Verbal;Demo  -GJ     Reps 8  --  3 laps  -GJ     Additional Comments  --  YTB  -GJ        Exercise 10    Exercise Name 10  --  patellar mobs, distal ham and quad STM  -GJ        Exercise 13    Exercise Name 13  --  HS curl  -GJ     Cueing 13  --  Verbal;Demo  -GJ     Reps 13  --  15  -GJ     Additional Comments  --  3 #  -GJ        Exercise 14    Exercise Name 14  --  mini  squats  -GJ     Cueing 14  --  Verbal;Tactile  -GJ     Reps 14  --  15  -GJ        Exercise 17    Exercise Name 17  --  TKE band  -GJ     Cueing 17  --  Verbal  -GJ     Reps 17  --  15  -GJ     Time 17  --  5 s  -GJ     Additional Comments  --  GTB  -GJ        Exercise 19    Exercise Name 19  --  seated TKE into ball, strong focus on quad contraction  -GJ     Cueing 19  --  Verbal;Demo  -GJ     Reps 19  --  15  -GJ     Time 19  --  5 s  -GJ        Exercise 20    Exercise Name 20  --  SLR, R, focuson quad contraction, reset after each set  -GJ     Cueing 20   --  Verbal;Demo  -GJ     Reps 20  --  15  -GJ     Additional Comments  --  noted 5 degree extensor lag  -GJ       User Key  (r) = Recorded By, (t) = Taken By, (c) = Cosigned By    Initials Name Provider Type    Parminder Ziegler, PT Physical Therapist                      Manual Rx (last 36 hours)      Manual Treatments     Row Name 04/13/20 1649             Total Minutes    46154 - PT Manual Therapy Minutes  10  -GJ         Manual Rx 1    Manual Rx 1 Location  mobs R patella, M/L, superior/inferior  -GJ         Manual Rx 2    Manual Rx 2 Location  STM R HS tissue  -GJ        User Key  (r) = Recorded By, (t) = Taken By, (c) = Cosigned By    Initials Name Provider Type    Parminder Ziegler, PT Physical Therapist          PT OP Goals     Row Name 04/13/20 1600          PT Short Term Goals    STG Date to Achieve  03/25/20  -GJ     STG 1  The pt will demonstrate IND and compliant with initial HEP focused on R knee AROM.  -GJ     STG 1 Progress  Met  -GJ     STG 2  The pt will demonstrate R knee AROM 0-120 for improved functional mobility and gait pattern.  -GJ     STG 2 Progress  Progressing  -GJ     STG 3  The pt will ambulate with SPC and demonstrate near normal gait pattern/ heel strike.  -GJ     STG 3 Progress  Met  -GJ     STG 4  The pt will perform 10 IND SLR without quad lag noted for improved quad activation.  -GJ     STG 4 Progress  Progressing;Partially Met  -GJ     STG 4 Progress Comments  5 degree ext lag, improving  -GJ        Long Term Goals    LTG Date to Achieve  05/03/20  -GJ     LTG 1  The pt will demonstrate IND and compliant with progressive HEP focused on IND condition management and return to PLOF.  -GJ     LTG 1 Progress  Progressing  -GJ     LTG 2  The pt will resume reciprocal stair navigation for improved home navigation.  -GJ     LTG 2 Progress  Ongoing  -GJ     LTG 3  The pt will score greater than or equal to 55% ability on the KOS to indicate imptoved perceived performance of ADLs.  -GJ      LTG 3 Progress  Met  -     LTG 4  The pt will resume walking over uneven ground in backyard without AD for return to recreational activity performance (caring for chickens, woodworking).  -     LTG 4 Progress  Ongoing  -     LTG 5  The pt will tolerate prolonged standing/ walking at least 30 min for improved community navigation and potential return to work activities.  -     LTG 5 Progress  Progressing  -       User Key  (r) = Recorded By, (t) = Taken By, (c) = Cosigned By    Initials Name Provider Type     Parminder Oleary, PT Physical Therapist          Therapy Education  Given: HEP, Symptoms/condition management, Pain management, Mobility training, Posture/body mechanics, Edema management  Program: New, Reinforced, Progressed  How Provided: Verbal, Demonstration, Written  Provided to: Patient  Level of Understanding: Teach back education performed, Verbalized, Demonstrated              Time Calculation:   Start Time: 1649  Stop Time: 1720  Time Calculation (min): 31 min  Therapy Charges for Today     Code Description Service Date Service Provider Modifiers Qty    10303297987  PT THER PROC EA 15 MIN 4/13/2020 Parminder Oleary, PT GP 1    [id number]  PT MANUAL THERAPY EA 15 MIN 4/13/2020 Parminder Oleary, PT GP 1                    Parminder Oleary PT  4/13/2020

## 2020-04-15 ENCOUNTER — APPOINTMENT (OUTPATIENT)
Dept: PHYSICAL THERAPY | Facility: HOSPITAL | Age: 65
End: 2020-04-15

## 2020-04-16 ENCOUNTER — HOSPITAL ENCOUNTER (OUTPATIENT)
Dept: PHYSICAL THERAPY | Facility: HOSPITAL | Age: 65
Setting detail: THERAPIES SERIES
Discharge: HOME | End: 2020-04-16

## 2020-04-16 DIAGNOSIS — R26.2 DIFFICULTY WALKING: ICD-10-CM

## 2020-04-16 DIAGNOSIS — G89.29 CHRONIC PAIN OF RIGHT KNEE: Primary | ICD-10-CM

## 2020-04-16 DIAGNOSIS — Z47.89 ORTHOPEDIC AFTERCARE: ICD-10-CM

## 2020-04-16 DIAGNOSIS — R53.1 WEAKNESS: ICD-10-CM

## 2020-04-16 DIAGNOSIS — M25.561 CHRONIC PAIN OF RIGHT KNEE: Primary | ICD-10-CM

## 2020-04-16 PROCEDURE — 97110 THERAPEUTIC EXERCISES: CPT | Performed by: PHYSICAL THERAPIST

## 2020-04-16 PROCEDURE — 97140 MANUAL THERAPY 1/> REGIONS: CPT | Performed by: PHYSICAL THERAPIST

## 2020-04-16 NOTE — THERAPY TREATMENT NOTE
Outpatient Physical Therapy Ortho Treatment Note  [facility]     Patient Name: Ava Lagos  : 1955  MRN: [id number]  Today's Date: 2020      Visit Date: 2020    Visit Dx:    ICD-10-CM ICD-9-CM   1. Chronic pain of right knee M25.561 719.46    G89.29 338.29   2. Orthopedic aftercare Z47.89 V54.9   3. Difficulty walking R26.2 719.7   4. Weakness R53.1 780.79       Patient Active Problem List   Diagnosis   • Total knee replacement status        Past Medical History:   Diagnosis Date   • Arthritis    • COPD (chronic obstructive pulmonary disease) (CMS/[facility])    • Diabetes mellitus (CMS/[facility])    • Diverticulitis    • Heart murmur    • Neck pain    • Right knee pain         Past Surgical History:   Procedure Laterality Date   • BUNIONECTOMY Bilateral    •  SECTION     • COLONOSCOPY     • GALLBLADDER SURGERY     • KNEE ARTHROSCOPY Right    • OVARIAN CYST SURGERY     • TOTAL KNEE ARTHROPLASTY Right 2020    Procedure: TOTAL KNEE ARTHROPLASTY;  Surgeon: Robbin Jennings II, MD;  Location: [facility];  Service: Orthopedics;  Laterality: Right;   • UMBILICAL HERNIA REPAIR         PT Ortho     Row Name 20 1600       Right Lower Ext    Rt Knee Extension/Flexion AROM  10-  -      User Key  (r) = Recorded By, (t) = Taken By, (c) = Cosigned By    Initials Name Provider Type    Parminder Ziegler, PT Physical Therapist                      PT Assessment/Plan     Row Name 20 1645          PT Assessment    Assessment Comments  Ms. Lagos is 8 weeks s/p R TKA, long hisotry of R knee pain prior to surgery ( 5 years).  She demonstrates improving volitional quad contraction quality.  Continues with ~ 5 degree extensor lag during SLR.  She is to focus on quad work at home.  she demosntrates reciprocal ascending of stairs with 1 rail.  Ms. Cummins devanues to be an excellent candidate for skilled physical therapy.   -SHAHLA        PT Plan    PT Plan Comments   patellar mobs, quad strengthening, gait normalization, ROM  -GJ       User Key  (r) = Recorded By, (t) = Taken By, (c) = Cosigned By    Initials Name Provider Type    Parminder Ziegler, PT Physical Therapist            OP Exercises     Row Name 04/16/20 1600 04/16/20 1557 04/16/20 1500       Subjective Comments    Subjective Comments  i feel like I have over taxed my knee.  -GJ  --  --       Subjective Pain    Pre-Treatment Pain Level  1  -GJ  --  --       Total Minutes    86817 - PT Therapeutic Exercise Minutes  --  25  -GJ  --    32764 - PT Manual Therapy Minutes  --  15  -GJ  --       Exercise 1    Exercise Name 1  --  --  QS, verbal, tactile, demonstration for good volitional contraction of quad, in supine and sitting EOB  -GJ       Exercise 2    Exercise Name 2  --  --  Nustep  -GJ    Time 2  --  --  4 min  -GJ       Exercise 3    Exercise Name 3  --  --  heel prop stretch  -GJ    Cueing 3  --  --  Verbal  -GJ    Additional Comments  --  --  during manual   -GJ       Exercise 7    Exercise Name 7  --  --  step up  -GJ    Cueing 7  --  --  Verbal;Demo  -GJ    Reps 7  --  --  15  -GJ    Additional Comments  --  --  6inch  -GJ       Exercise 8    Exercise Name 8  --  --  lateral stepping  -GJ    Cueing 8  --  --  Verbal;Demo  -GJ    Reps 8  --  --  3 laps  -GJ    Additional Comments  --  --  YTB  -GJ       Exercise 10    Exercise Name 10  --  --  patellar mobs, mostly superiorly to influence extension  -GJ       Exercise 12    Exercise Name 12  --  --  LAQ  -GJ    Cueing 12  --  --  Verbal;Demo  -GJ    Reps 12  --  --  15  -GJ    Additional Comments  --  --  cues to squeeze at top (extended position)  -GJ       Exercise 17    Exercise Name 17  --  --  TKE band  -GJ    Cueing 17  --  --  Verbal  -GJ    Reps 17  --  --  15  -GJ    Time 17  --  --  5 s  -GJ    Additional Comments  --  --  GTB  -GJ       Exercise 20    Exercise Name 20  --  --  SLR, R, focuson quad contraction, reset after each set  -GJ    Cueing 20   --  --  Verbal;Demo  -GJ    Reps 20  --  --  15  -GJ      User Key  (r) = Recorded By, (t) = Taken By, (c) = Cosigned By    Initials Name Provider Type    Parminder Ziegler, PT Physical Therapist                      Manual Rx (last 36 hours)      Manual Treatments     Row Name 04/16/20 1557             Total Minutes    43676 - PT Manual Therapy Minutes  15  -GJ        User Key  (r) = Recorded By, (t) = Taken By, (c) = Cosigned By    Initials Name Provider Type    Parminder Ziegler, PT Physical Therapist          PT OP Goals     Row Name 04/16/20 1500          PT Short Term Goals    STG Date to Achieve  03/25/20  -GJ     STG 1  The pt will demonstrate IND and compliant with initial HEP focused on R knee AROM.  -GJ     STG 1 Progress  Met  -GJ     STG 2  The pt will demonstrate R knee AROM 0-120 for improved functional mobility and gait pattern.  -GJ     STG 2 Progress  Progressing  -GJ     STG 2 Progress Comments  10 short of full extension  -GJ     STG 3  The pt will ambulate with SPC and demonstrate near normal gait pattern/ heel strike.  -GJ     STG 3 Progress  Met  -GJ     STG 4  The pt will perform 10 IND SLR without quad lag noted for improved quad activation.  -GJ     STG 4 Progress  Progressing;Partially Met  -GJ     STG 4 Progress Comments  5 degree extensor lag, improving  -GJ        Long Term Goals    LTG Date to Achieve  05/03/20  -GJ     LTG 1  The pt will demonstrate IND and compliant with progressive HEP focused on IND condition management and return to PLOF.  -GJ     LTG 1 Progress  Progressing  -GJ     LTG 2  The pt will resume reciprocal stair navigation for improved home navigation.  -GJ     LTG 2 Progress  Ongoing;Partially Met  -GJ     LTG 2 Progress Comments  ascends reciprocally   -GJ     LTG 3  The pt will score greater than or equal to 55% ability on the KOS to indicate imptoved perceived performance of ADLs.  -GJ     LTG 3 Progress  Met  -GJ     LTG 4  The pt will resume walking over  uneven ground in backyard without AD for return to recreational activity performance (caring for chickens, woodworking).  -     LTG 4 Progress  Ongoing  -     LTG 5  The pt will tolerate prolonged standing/ walking at least 30 min for improved community navigation and potential return to work activities.  -     LTG 5 Progress  Progressing  -       User Key  (r) = Recorded By, (t) = Taken By, (c) = Cosigned By    Initials Name Provider Type     Parminder Oleary, PT Physical Therapist          Therapy Education  Education Details: continue to focus on quad activation activities, QS, SLR, TKE, step up  Given: HEP, Symptoms/condition management, Pain management, Mobility training, Posture/body mechanics  Program: Reinforced  How Provided: Verbal, Demonstration  Provided to: Patient  Level of Understanding: Verbalized, Teach back education performed, Demonstrated              Time Calculation:   Start Time: 1557  Stop Time: 1640  Time Calculation (min): 43 min  Therapy Charges for Today     Code Description Service Date Service Provider Modifiers Qty    37868877084  PT THER PROC EA 15 MIN 4/16/2020 Parminder Oleary, PT GP 2    [id number]  PT MANUAL THERAPY EA 15 MIN 4/16/2020 Parminder Oleary, PT GP 1                    Parminder Oleary PT  4/16/2020

## 2020-04-16 NOTE — THERAPY TREATMENT NOTE
Outpatient Physical Therapy Ortho Treatment Note  [facility]     Patient Name: Ava Lagos  : 1955  MRN: [id number]  Today's Date: 2020      Visit Date: 2020    Visit Dx:    ICD-10-CM ICD-9-CM   1. Chronic pain of right knee M25.561 719.46    G89.29 338.29   2. Orthopedic aftercare Z47.89 V54.9   3. Difficulty walking R26.2 719.7   4. Weakness R53.1 780.79       Patient Active Problem List   Diagnosis   • Total knee replacement status        Past Medical History:   Diagnosis Date   • Arthritis    • COPD (chronic obstructive pulmonary disease) (CMS/[facility])    • Diabetes mellitus (CMS/[facility])    • Diverticulitis    • Heart murmur    • Neck pain    • Right knee pain         Past Surgical History:   Procedure Laterality Date   • BUNIONECTOMY Bilateral    •  SECTION     • COLONOSCOPY     • GALLBLADDER SURGERY     • KNEE ARTHROSCOPY Right    • OVARIAN CYST SURGERY     • TOTAL KNEE ARTHROPLASTY Right 2020    Procedure: TOTAL KNEE ARTHROPLASTY;  Surgeon: Robbin Jennings II, MD;  Location: [facility];  Service: Orthopedics;  Laterality: Right;   • UMBILICAL HERNIA REPAIR         PT Ortho     Row Name 20 1600       Right Lower Ext    Rt Knee Extension/Flexion AROM  10-  -      User Key  (r) = Recorded By, (t) = Taken By, (c) = Cosigned By    Initials Name Provider Type    Parminder Ziegler, PT Physical Therapist                      PT Assessment/Plan     Row Name 20 1645          PT Assessment    Assessment Comments  Ms. Lagos is 8 weeks s/p R TKA, long hisotry of R knee pain prior to surgery ( 5 years).  She demonstrates improving volitional quad contraction quality.  Continues with ~ 5 degree extensor lag during SLR.  She is to focus on quad work at home.  she demosntrates reciprocal ascending of stairs with 1 rail.  Ms. Cummins devanues to be an excellent candidate for skilled physical therapy.   -SHAHLA        PT Plan    PT Plan Comments   patellar mobs, quad strengthening, gait normalization, ROM  -GJ       User Key  (r) = Recorded By, (t) = Taken By, (c) = Cosigned By    Initials Name Provider Type    Parminder Ziegler, PT Physical Therapist            OP Exercises     Row Name 04/16/20 1600 04/16/20 1557 04/16/20 1500       Subjective Comments    Subjective Comments  i feel like I have over taxed my knee.  -GJ  --  --       Subjective Pain    Pre-Treatment Pain Level  1  -GJ  --  --       Total Minutes    54824 - PT Therapeutic Exercise Minutes  --  25  -GJ  --    03903 - PT Manual Therapy Minutes  --  15  -GJ  --       Exercise 1    Exercise Name 1  --  --  QS, verbal, tactile, demonstration for good volitional contraction of quad, in supine and sitting EOB  -GJ       Exercise 2    Exercise Name 2  --  --  Nustep  -GJ    Time 2  --  --  4 min  -GJ       Exercise 3    Exercise Name 3  --  --  heel prop stretch  -GJ    Cueing 3  --  --  Verbal  -GJ    Additional Comments  --  --  during manual   -GJ       Exercise 7    Exercise Name 7  --  --  step up  -GJ    Cueing 7  --  --  Verbal;Demo  -GJ    Reps 7  --  --  15  -GJ    Additional Comments  --  --  6inch  -GJ       Exercise 8    Exercise Name 8  --  --  lateral stepping  -GJ    Cueing 8  --  --  Verbal;Demo  -GJ    Reps 8  --  --  3 laps  -GJ    Additional Comments  --  --  YTB  -GJ       Exercise 10    Exercise Name 10  --  --  patellar mobs, mostly superiorly to influence extension  -GJ       Exercise 12    Exercise Name 12  --  --  LAQ  -GJ    Cueing 12  --  --  Verbal;Demo  -GJ    Reps 12  --  --  15  -GJ    Additional Comments  --  --  cues to squeeze at top (extended position)  -GJ       Exercise 17    Exercise Name 17  --  --  TKE band  -GJ    Cueing 17  --  --  Verbal  -GJ    Reps 17  --  --  15  -GJ    Time 17  --  --  5 s  -GJ    Additional Comments  --  --  GTB  -GJ       Exercise 20    Exercise Name 20  --  --  SLR, R, focuson quad contraction, reset after each set  -GJ    Cueing 20   --  --  Verbal;Demo  -GJ    Reps 20  --  --  15  -GJ      User Key  (r) = Recorded By, (t) = Taken By, (c) = Cosigned By    Initials Name Provider Type    Parminder Ziegler, PT Physical Therapist                      Manual Rx (last 36 hours)      Manual Treatments     Row Name 04/16/20 1557             Total Minutes    54461 - PT Manual Therapy Minutes  15  -GJ         Manual Rx 1    Manual Rx 1 Location  mobs R patella, M/L, superior/inferior  -GJ      Manual Rx 1 Type  mobs R femur/tibia   -GJ      Manual Rx 1 Grade  distractoin femur/tibia  -GJ        User Key  (r) = Recorded By, (t) = Taken By, (c) = Cosigned By    Initials Name Provider Type    Parminder Ziegler, PT Physical Therapist          PT OP Goals     Row Name 04/16/20 1500          PT Short Term Goals    STG Date to Achieve  03/25/20  -GJ     STG 1  The pt will demonstrate IND and compliant with initial HEP focused on R knee AROM.  -GJ     STG 1 Progress  Met  -GJ     STG 2  The pt will demonstrate R knee AROM 0-120 for improved functional mobility and gait pattern.  -GJ     STG 2 Progress  Progressing  -GJ     STG 2 Progress Comments  10 short of full extension  -GJ     STG 3  The pt will ambulate with SPC and demonstrate near normal gait pattern/ heel strike.  -GJ     STG 3 Progress  Met  -GJ     STG 4  The pt will perform 10 IND SLR without quad lag noted for improved quad activation.  -GJ     STG 4 Progress  Progressing;Partially Met  -GJ     STG 4 Progress Comments  5 degree extensor lag, improving  -GJ        Long Term Goals    LTG Date to Achieve  05/03/20  -GJ     LTG 1  The pt will demonstrate IND and compliant with progressive HEP focused on IND condition management and return to PLOF.  -GJ     LTG 1 Progress  Progressing  -GJ     LTG 2  The pt will resume reciprocal stair navigation for improved home navigation.  -GJ     LTG 2 Progress  Ongoing;Partially Met  -GJ     LTG 2 Progress Comments  ascends reciprocally   -GJ     LTG 3  The pt  will score greater than or equal to 55% ability on the KOS to indicate imptoved perceived performance of ADLs.  -     LTG 3 Progress  Met  -     LTG 4  The pt will resume walking over uneven ground in backyard without AD for return to recreational activity performance (caring for chickens, woodworking).  -GJ     LTG 4 Progress  Ongoing  -     LTG 5  The pt will tolerate prolonged standing/ walking at least 30 min for improved community navigation and potential return to work activities.  -     LTG 5 Progress  Progressing  -       User Key  (r) = Recorded By, (t) = Taken By, (c) = Cosigned By    Initials Name Provider Type    Parminder Ziegler, PT Physical Therapist          Therapy Education  Education Details: continue to focus on quad activation activities, QS, SLR, TKE, step up  Given: HEP, Symptoms/condition management, Pain management, Mobility training, Posture/body mechanics  Program: Reinforced  How Provided: Verbal, Demonstration  Provided to: Patient  Level of Understanding: Verbalized, Teach back education performed, Demonstrated              Time Calculation:   Start Time: 1557  Stop Time: 1640  Time Calculation (min): 43 min  Therapy Charges for Today     Code Description Service Date Service Provider Modifiers Qty    00809678327  PT THER PROC EA 15 MIN 4/16/2020 Parminder Oleary, PT GP 2    [id number]  PT MANUAL THERAPY EA 15 MIN 4/16/2020 Parminder Oleary, PT GP 1                    Parminder Oleary PT  4/16/2020

## 2020-04-20 ENCOUNTER — HOSPITAL ENCOUNTER (OUTPATIENT)
Dept: PHYSICAL THERAPY | Facility: HOSPITAL | Age: 65
Setting detail: THERAPIES SERIES
Discharge: HOME | End: 2020-04-20

## 2020-04-20 DIAGNOSIS — Z47.89 ORTHOPEDIC AFTERCARE: ICD-10-CM

## 2020-04-20 DIAGNOSIS — R53.1 WEAKNESS: ICD-10-CM

## 2020-04-20 DIAGNOSIS — M25.561 CHRONIC PAIN OF RIGHT KNEE: Primary | ICD-10-CM

## 2020-04-20 DIAGNOSIS — R26.2 DIFFICULTY WALKING: ICD-10-CM

## 2020-04-20 DIAGNOSIS — G89.29 CHRONIC PAIN OF RIGHT KNEE: Primary | ICD-10-CM

## 2020-04-20 PROCEDURE — 97140 MANUAL THERAPY 1/> REGIONS: CPT | Performed by: PHYSICAL THERAPIST

## 2020-04-20 PROCEDURE — 97110 THERAPEUTIC EXERCISES: CPT | Performed by: PHYSICAL THERAPIST

## 2020-04-20 NOTE — THERAPY TREATMENT NOTE
"    Outpatient Physical Therapy Ortho Treatment Note  [facility]     Patient Name: Ava Lagos  : 1955  MRN: [id number]  Today's Date: 2020      Visit Date: 2020    Visit Dx:    ICD-10-CM ICD-9-CM   1. Chronic pain of right knee M25.561 719.46    G89.29 338.29   2. Orthopedic aftercare Z47.89 V54.9   3. Weakness R53.1 780.79   4. Difficulty walking R26.2 719.7       Patient Active Problem List   Diagnosis   • Total knee replacement status        Past Medical History:   Diagnosis Date   • Arthritis    • COPD (chronic obstructive pulmonary disease) (CMS/[facility])    • Diabetes mellitus (CMS/[facility])    • Diverticulitis    • Heart murmur    • Neck pain    • Right knee pain         Past Surgical History:   Procedure Laterality Date   • BUNIONECTOMY Bilateral    •  SECTION     • COLONOSCOPY     • GALLBLADDER SURGERY     • KNEE ARTHROSCOPY Right    • OVARIAN CYST SURGERY     • TOTAL KNEE ARTHROPLASTY Right 2020    Procedure: TOTAL KNEE ARTHROPLASTY;  Surgeon: Robbin Jennings II, MD;  Location: [facility];  Service: Orthopedics;  Laterality: Right;   • UMBILICAL HERNIA REPAIR         PT Ortho     Row Name 20 1600       Right Lower Ext    Rt Knee Extension/Flexion AROM  10  -GJ    Rt Knee Extension/Flexion PROM  6  -GJ      User Key  (r) = Recorded By, (t) = Taken By, (c) = Cosigned By    Initials Name Provider Type     Parminder Oleary, PT Physical Therapist                      PT Assessment/Plan     Row Name 20 1643          PT Assessment    Assessment Comments   is 8.5 weeks s/p R TKA, with long hx of R knee pain priorto surgery (5 years).  She demonstrates much improved gait pattern today, with near normal heel to toe gait pattern. We added leg press today.  I believe the \"popping\" I hear with step ups (during eccentric phase) is snapping hip syndrome.  She continue with extensor lag during SLR.  Ms. Cummins continues to be a good candidate " for skilled physical therapy.   -GJ        PT Plan    PT Plan Comments  patellar mobs, quad strengtheing, gait normizaiton, ROM  -GJ       User Key  (r) = Recorded By, (t) = Taken By, (c) = Cosigned By    Initials Name Provider Type    Parminder Ziegler, PT Physical Therapist            OP Exercises     Row Name 04/20/20 1608 04/20/20 1600          Subjective Comments    Subjective Comments  --  I was a bad girl   -GJ        Total Minutes    91046 - PT Therapeutic Exercise Minutes  15  -GJ  --     53751 - PT Manual Therapy Minutes  20  -GJ  --        Exercise 1    Exercise Name 1  --  QS, verbal, tactile, demonstration for good volitional contraction of quad, in supine and sitting EOB  -GJ     Cueing 1  --  Verbal  -GJ     Reps 1  --  15  -GJ     Time 1  --  5 s  -GJ        Exercise 2    Exercise Name 2  --  Nustep  -GJ     Time 2  --  4 min  -GJ        Exercise 3    Exercise Name 3  --  heel prop stretch  -GJ     Cueing 3  --  Verbal  -GJ     Reps 3  --  1  -GJ     Time 3  --  1 min  -GJ        Exercise 6    Exercise Name 6  --  leg press, RLE only  -GJ     Cueing 6  --  Verbal;Demo  -GJ     Reps 6  --  20  -GJ     Additional Comments  --  80#  -GJ        Exercise 7    Exercise Name 7  --  step up  -GJ     Cueing 7  --  Verbal;Demo  -GJ     Reps 7  --  15  -GJ     Additional Comments  --  6 inch  -GJ        Exercise 20    Exercise Name 20  --  SLR, R, focuson quad contraction, reset after each set  -GJ     Cueing 20  --  Verbal;Demo  -GJ     Reps 20  --  15  -GJ       User Key  (r) = Recorded By, (t) = Taken By, (c) = Cosigned By    Initials Name Provider Type    Parminder Ziegler, PT Physical Therapist                      Manual Rx (last 36 hours)      Manual Treatments     Row Name 04/20/20 1608             Total Minutes    63382 - PT Manual Therapy Minutes  20  -GJ         Manual Rx 1    Manual Rx 1 Location  mobs R patella, M/L, superior/inferior  -GJ      Manual Rx 1 Type  mobs R femur/tibia   -GJ       Manual Rx 1 Grade  distractoin femur/tibia  -GJ      Manual Rx 1 Duration  PROM into flexion/ext  -GJ        User Key  (r) = Recorded By, (t) = Taken By, (c) = Cosigned By    Initials Name Provider Type    Parminder Ziegler, PT Physical Therapist          PT OP Goals     Row Name 04/20/20 1600          PT Short Term Goals    STG Date to Achieve  03/25/20  -GJ     STG 1  The pt will demonstrate IND and compliant with initial HEP focused on R knee AROM.  -GJ     STG 1 Progress  Met  -GJ     STG 2  The pt will demonstrate R knee AROM 0-120 for improved functional mobility and gait pattern.  -GJ     STG 2 Progress  Progressing  -GJ     STG 3  The pt will ambulate with SPC and demonstrate near normal gait pattern/ heel strike.  -GJ     STG 3 Progress  Met  -GJ     STG 4  The pt will perform 10 IND SLR without quad lag noted for improved quad activation.  -GJ     STG 4 Progress  Progressing;Partially Met  -GJ     STG 4 Progress Comments  continues with mild ext. lag  -GJ        Long Term Goals    LTG Date to Achieve  05/03/20  -GJ     LTG 1  The pt will demonstrate IND and compliant with progressive HEP focused on IND condition management and return to PLOF.  -GJ     LTG 1 Progress  Progressing  -GJ     LTG 2  The pt will resume reciprocal stair navigation for improved home navigation.  -GJ     LTG 2 Progress  Ongoing;Partially Met  -GJ     LTG 3  The pt will score greater than or equal to 55% ability on the KOS to indicate imptoved perceived performance of ADLs.  -GJ     LTG 3 Progress  Met  -GJ     LTG 4  The pt will resume walking over uneven ground in backyard without AD for return to recreational activity performance (caring for chickens, woodworking).  -GJ     LTG 4 Progress  Ongoing  -GJ     LTG 5  The pt will tolerate prolonged standing/ walking at least 30 min for improved community navigation and potential return to work activities.  -GJ     LTG 5 Progress  Progressing  -GJ       User Key  (r) = Recorded By,  (t) = Taken By, (c) = Cosigned By    Initials Name Provider Type    Parminder Ziegler, PT Physical Therapist          Therapy Education  Given: HEP, Symptoms/condition management, Pain management, Mobility training, Posture/body mechanics, Edema management  Program: Reinforced, New  How Provided: Verbal, Demonstration  Provided to: Patient  Level of Understanding: Teach back education performed, Demonstrated, Verbalized              Time Calculation:   Start Time: 1602  Stop Time: 1640  Time Calculation (min): 38 min  Therapy Charges for Today     Code Description Service Date Service Provider Modifiers Qty    [id number]  PT THER PROC EA 15 MIN 4/20/2020 Parminder Oleary, PT GP 1    [id number]  PT MANUAL THERAPY EA 15 MIN 4/20/2020 Parminder Oleary, PT GP 1                    Parminder Oleary, PT  4/20/2020

## 2020-04-23 ENCOUNTER — HOSPITAL ENCOUNTER (OUTPATIENT)
Dept: PHYSICAL THERAPY | Facility: HOSPITAL | Age: 65
Setting detail: THERAPIES SERIES
Discharge: HOME | End: 2020-04-23

## 2020-04-23 DIAGNOSIS — G89.29 CHRONIC PAIN OF RIGHT KNEE: Primary | ICD-10-CM

## 2020-04-23 DIAGNOSIS — R26.2 DIFFICULTY WALKING: ICD-10-CM

## 2020-04-23 DIAGNOSIS — R53.1 WEAKNESS: ICD-10-CM

## 2020-04-23 DIAGNOSIS — Z47.89 ORTHOPEDIC AFTERCARE: ICD-10-CM

## 2020-04-23 DIAGNOSIS — M25.561 CHRONIC PAIN OF RIGHT KNEE: Primary | ICD-10-CM

## 2020-04-23 PROCEDURE — 97140 MANUAL THERAPY 1/> REGIONS: CPT | Performed by: PHYSICAL THERAPIST

## 2020-04-23 PROCEDURE — 97110 THERAPEUTIC EXERCISES: CPT | Performed by: PHYSICAL THERAPIST

## 2020-04-23 NOTE — THERAPY TREATMENT NOTE
Outpatient Physical Therapy Ortho Treatment Note  [facility]     Patient Name: Ava Lagos  : 1955  MRN: [id number]  Today's Date: 2020      Visit Date: 2020    Visit Dx:    ICD-10-CM ICD-9-CM   1. Chronic pain of right knee M25.561 719.46    G89.29 338.29   2. Weakness R53.1 780.79   3. Difficulty walking R26.2 719.7   4. Orthopedic aftercare Z47.89 V54.9       Patient Active Problem List   Diagnosis   • Total knee replacement status        Past Medical History:   Diagnosis Date   • Arthritis    • COPD (chronic obstructive pulmonary disease) (CMS/[facility])    • Diabetes mellitus (CMS/[facility])    • Diverticulitis    • Heart murmur    • Neck pain    • Right knee pain         Past Surgical History:   Procedure Laterality Date   • BUNIONECTOMY Bilateral    •  SECTION     • COLONOSCOPY     • GALLBLADDER SURGERY     • KNEE ARTHROSCOPY Right    • OVARIAN CYST SURGERY     • TOTAL KNEE ARTHROPLASTY Right 2020    Procedure: TOTAL KNEE ARTHROPLASTY;  Surgeon: Robbin Jennings II, MD;  Location: [facility];  Service: Orthopedics;  Laterality: Right;   • UMBILICAL HERNIA REPAIR         PT Ortho     Row Name 20 1600       Right Lower Ext    Rt Knee Extension/Flexion PROM  2  -      User Key  (r) = Recorded By, (t) = Taken By, (c) = Cosigned By    Initials Name Provider Type    Parminder Ziegler, PT Physical Therapist                      PT Assessment/Plan     Row Name 20 1644          PT Assessment    Assessment Comments  Ms. Lagos is 9 weeks s/p R TKA.  She demonstrates the best knee extension (passively) that she has to date.  We worked on quad contraction, patellar and femoral/tibial joint mobs.  Ms. Lagos is progressing toward all functional goals and remains a good candidate for skilled physical therapy.   -SHAHLA        PT Plan    PT Plan Comments  patellar mobs, quad strengtheingg, stretch hip flexor, gait normilizatoin, flexion ROM next session  -SHAHLA        User Key  (r) = Recorded By, (t) = Taken By, (c) = Cosigned By    Initials Name Provider Type    Parminder Ziegler, PT Physical Therapist            OP Exercises     Row Name 04/23/20 1602 04/23/20 1600          Subjective Comments    Subjective Comments  --  I'm doing ok  -GJ        Total Minutes    56614 - PT Therapeutic Exercise Minutes  15  -GJ  --     77061 - PT Manual Therapy Minutes  20  -GJ  --        Exercise 2    Exercise Name 2  --  Nustep  -GJ     Time 2  --  4 min  -GJ        Exercise 3    Exercise Name 3  --  heel prop stretch  -GJ     Cueing 3  --  Verbal  -GJ     Reps 3  --  1  -GJ     Time 3  --  1 min  -GJ        Exercise 6    Exercise Name 6  --  leg press, RLE only  -GJ     Cueing 6  --  Verbal;Demo  -GJ     Reps 6  --  20  -GJ     Additional Comments  --  80#  -GJ        Exercise 7    Exercise Name 7  --  step up  -GJ     Cueing 7  --  Verbal;Demo  -GJ     Reps 7  --  15  -GJ        Exercise 17    Exercise Name 17  --  standing TKE  -GJ     Cueing 17  --  Verbal;Demo  -GJ     Reps 17  --  15  -GJ     Time 17  --  5 s  -GJ     Additional Comments  --  GTB, focus on quad contractoin  -GJ        Exercise 20    Exercise Name 20  --  SLR, R, focuson quad contraction, reset after each set  -GJ     Cueing 20  --  Verbal;Demo  -GJ     Reps 20  --  15  -GJ       User Key  (r) = Recorded By, (t) = Taken By, (c) = Cosigned By    Initials Name Provider Type    Parminder Ziegler, PT Physical Therapist                      Manual Rx (last 36 hours)      Manual Treatments     Row Name 04/23/20 1602             Total Minutes    61852 - PT Manual Therapy Minutes  20  -GJ         Manual Rx 1    Manual Rx 1 Location  mobs R patella, M/L, superior/inferior  -GJ      Manual Rx 1 Type  mobs R femur/tibia   -GJ      Manual Rx 1 Grade  distractoin femur/tibia  -GJ      Manual Rx 1 Duration  PROM into flexion/ext  -GJ         Manual Rx 2    Manual Rx 2 Location  manual stretch R hip flexors, pt in L SL  -GJ         User Key  (r) = Recorded By, (t) = Taken By, (c) = Cosigned By    Initials Name Provider Type    Parminder Ziegler, PT Physical Therapist          PT OP Goals     Row Name 04/23/20 1600          PT Short Term Goals    STG Date to Achieve  03/25/20  -GJ     STG 1  The pt will demonstrate IND and compliant with initial HEP focused on R knee AROM.  -GJ     STG 1 Progress  Met  -GJ     STG 2  The pt will demonstrate R knee AROM 0-120 for improved functional mobility and gait pattern.  -GJ     STG 2 Progress  Progressing  -GJ     STG 3  The pt will ambulate with SPC and demonstrate near normal gait pattern/ heel strike.  -GJ     STG 3 Progress  Met  -GJ     STG 4  The pt will perform 10 IND SLR without quad lag noted for improved quad activation.  -GJ     STG 4 Progress  Progressing;Partially Met  -GJ        Long Term Goals    LTG Date to Achieve  05/03/20  -GJ     LTG 1  The pt will demonstrate IND and compliant with progressive HEP focused on IND condition management and return to PLOF.  -GJ     LTG 1 Progress  Progressing  -GJ     LTG 2  The pt will resume reciprocal stair navigation for improved home navigation.  -GJ     LTG 2 Progress  Ongoing;Partially Met  -GJ     LTG 3  The pt will score greater than or equal to 55% ability on the KOS to indicate imptoved perceived performance of ADLs.  -GJ     LTG 3 Progress  Met  -GJ     LTG 4  The pt will resume walking over uneven ground in backyard without AD for return to recreational activity performance (caring for chickens, woodworking).  -GJ     LTG 4 Progress  Ongoing  -GJ     LTG 5  The pt will tolerate prolonged standing/ walking at least 30 min for improved community navigation and potential return to work activities.  -GJ     LTG 5 Progress  Progressing  -GJ       User Key  (r) = Recorded By, (t) = Taken By, (c) = Cosigned By    Initials Name Provider Type    Parminder Ziegler, PT Physical Therapist          Therapy Education  Education Details: added  SKTC,letting knee flex with gravity at home, no other changes to HEP  Given: HEP, Symptoms/condition management, Pain management, Mobility training  Program: Reinforced, New, Progressed  How Provided: Verbal, Demonstration  Provided to: Patient  Level of Understanding: Verbalized, Teach back education performed, Demonstrated              Time Calculation:   Start Time: 1600  Stop Time: 1637  Time Calculation (min): 37 min  Therapy Charges for Today     Code Description Service Date Service Provider Modifiers Qty    41674055900  PT THER PROC EA 15 MIN 4/23/2020 Parminder Oleary, PT GP 1    [id number]  PT MANUAL THERAPY EA 15 MIN 4/23/2020 Parminder Oleary, PT GP 1                    Parminder Oleary, PT  4/23/2020

## 2020-04-27 ENCOUNTER — HOSPITAL ENCOUNTER (OUTPATIENT)
Dept: PHYSICAL THERAPY | Facility: HOSPITAL | Age: 65
Setting detail: THERAPIES SERIES
Discharge: HOME | End: 2020-04-27

## 2020-04-27 DIAGNOSIS — M25.561 CHRONIC PAIN OF RIGHT KNEE: Primary | ICD-10-CM

## 2020-04-27 DIAGNOSIS — Z47.89 ORTHOPEDIC AFTERCARE: ICD-10-CM

## 2020-04-27 DIAGNOSIS — R53.1 WEAKNESS: ICD-10-CM

## 2020-04-27 DIAGNOSIS — R26.2 DIFFICULTY WALKING: ICD-10-CM

## 2020-04-27 DIAGNOSIS — G89.29 CHRONIC PAIN OF RIGHT KNEE: Primary | ICD-10-CM

## 2020-04-27 PROCEDURE — 97110 THERAPEUTIC EXERCISES: CPT | Performed by: PHYSICAL THERAPIST

## 2020-04-27 PROCEDURE — 97140 MANUAL THERAPY 1/> REGIONS: CPT | Performed by: PHYSICAL THERAPIST

## 2020-04-27 NOTE — THERAPY TREATMENT NOTE
Outpatient Physical Therapy Ortho Treatment Note  [facility]     Patient Name: Ava Lagos  : 1955  MRN: [id number]  Today's Date: 2020      Visit Date: 2020    Visit Dx:    ICD-10-CM ICD-9-CM   1. Chronic pain of right knee M25.561 719.46    G89.29 338.29   2. Weakness R53.1 780.79   3. Difficulty walking R26.2 719.7   4. Orthopedic aftercare Z47.89 V54.9       Patient Active Problem List   Diagnosis   • Total knee replacement status        Past Medical History:   Diagnosis Date   • Arthritis    • COPD (chronic obstructive pulmonary disease) (CMS/[facility])    • Diabetes mellitus (CMS/[facility])    • Diverticulitis    • Heart murmur    • Neck pain    • Right knee pain         Past Surgical History:   Procedure Laterality Date   • BUNIONECTOMY Bilateral    •  SECTION     • COLONOSCOPY     • GALLBLADDER SURGERY     • KNEE ARTHROSCOPY Right    • OVARIAN CYST SURGERY     • TOTAL KNEE ARTHROPLASTY Right 2020    Procedure: TOTAL KNEE ARTHROPLASTY;  Surgeon: Robbin Jennings II, MD;  Location: [facility];  Service: Orthopedics;  Laterality: Right;   • UMBILICAL HERNIA REPAIR         PT Ortho     Row Name 20 1600       Right Lower Ext    Rt Knee Extension/Flexion AROM    -GJ    Rt Knee Extension/Flexion PROM  4-115  -GJ      User Key  (r) = Recorded By, (t) = Taken By, (c) = Cosigned By    Initials Name Provider Type    Parminder Ziegler, PT Physical Therapist                      PT Assessment/Plan     Row Name 20 1643          PT Assessment    Assessment Comments  Ms. Lagos is 9.5 weeks s/p R TKA. 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  she continues to demonstrate improved gait pattern.  We worked on STM of R glut/piriformis tissue and flexion ROM today. Ms. Lagos is progressingn toward all functional goals.    -GJ        PT Plan    PT Plan Comments  work on extension ROM, quad strength,  gait normalization  -GJ       User Key  (r) = Recorded By, (t) = Taken By, (c) = Cosigned By    Initials Name Provider Type    Parminder Ziegler, PT Physical Therapist            OP Exercises     Row Name 04/27/20 1601 04/27/20 1600          Subjective Comments    Subjective Comments  --  I'm sore today.    -GJ        Total Minutes    83621 - PT Therapeutic Exercise Minutes  14  -GJ  --     05569 - PT Manual Therapy Minutes  20  -GJ  --        Exercise 2    Exercise Name 2  --  Nustep  -GJ     Time 2  --  4 min  -GJ        Exercise 3    Exercise Name 3  --  heel prop stretch  -GJ     Reps 3  --  1  -GJ     Time 3  --  1 min  -GJ        Exercise 6    Exercise Name 6  --  leg press, RLE only  -GJ     Cueing 6  --  Verbal;Demo  -GJ     Reps 6  --  20  -GJ     Additional Comments  --  80#  -GJ        Exercise 7    Exercise Name 7  --  step up  -GJ     Cueing 7  --  Verbal;Demo  -GJ     Reps 7  --  15  -GJ       User Key  (r) = Recorded By, (t) = Taken By, (c) = Cosigned By    Initials Name Provider Type    Parminder Ziegler, PT Physical Therapist                      Manual Rx (last 36 hours)      Manual Treatments     Row Name 04/27/20 1601 04/27/20 1500          Total Minutes    78719 - PT Manual Therapy Minutes  20  -GJ  --        Manual Rx 1    Manual Rx 1 Location  --  mobs R patella, M/L, superior/inferior  -GJ     Manual Rx 1 Type  --  distractoin of femoral/tibial joint  -GJ     Manual Rx 1 Duration  --  PROM into flexion/ext  -GJ        Manual Rx 2    Manual Rx 2 Location  --  manual stretch R hip flexors, pt in L SL  -GJ        Manual Rx 3    Manual Rx 3 Location  --  STM/deep massage R glut med/piriformis tissu  -GJ     Manual Rx 3 Type  --  pt L SL with pillow between knees  -GJ       User Key  (r) = Recorded By, (t) = Taken By, (c) = Cosigned By    Initials Name Provider Type    Parminder Ziegler, PT Physical Therapist          PT OP Goals     Row Name 04/27/20 1600          PT Short Term Goals    STG  Date to Achieve  03/25/20  -GJ     STG 1  The pt will demonstrate IND and compliant with initial HEP focused on R knee AROM.  -GJ     STG 1 Progress  Met  -GJ     STG 2  The pt will demonstrate R knee AROM 0-120 for improved functional mobility and gait pattern.  -GJ     STG 2 Progress  Progressing  -GJ     STG 2 Progress Comments  see ortho section  -GJ     STG 3  The pt will ambulate with SPC and demonstrate near normal gait pattern/ heel strike.  -GJ     STG 3 Progress  Met  -GJ     STG 4  The pt will perform 10 IND SLR without quad lag noted for improved quad activation.  -GJ     STG 4 Progress  Progressing;Partially Met  -GJ        Long Term Goals    LTG Date to Achieve  05/03/20  -GJ     LTG 1  The pt will demonstrate IND and compliant with progressive HEP focused on IND condition management and return to PLOF.  -GJ     LTG 1 Progress  Progressing  -GJ     LTG 2  The pt will resume reciprocal stair navigation for improved home navigation.  -GJ     LTG 2 Progress  Ongoing;Partially Met  -GJ     LTG 3  The pt will score greater than or equal to 55% ability on the KOS to indicate imptoved perceived performance of ADLs.  -GJ     LTG 3 Progress  Met  -GJ     LTG 4  The pt will resume walking over uneven ground in backyard without AD for return to recreational activity performance (caring for chickens, woodworking).  -GJ     LTG 4 Progress  Ongoing  -GJ     LTG 5  The pt will tolerate prolonged standing/ walking at least 30 min for improved community navigation and potential return to work activities.  -GJ     LTG 5 Progress  Met  -GJ     LTG 5 Progress Comments  --  -       User Key  (r) = Recorded By, (t) = Taken By, (c) = Cosigned By    Initials Name Provider Type    Parminder Ziegler, PT Physical Therapist          Therapy Education  Given: HEP, Symptoms/condition management, Pain management, Mobility training, Posture/body mechanics  Program: Reinforced  How Provided: Verbal  Provided to: Patient  Level of  Understanding: Verbalized              Time Calculation:   Start Time: 1600  Stop Time: 1635  Time Calculation (min): 35 min  Therapy Charges for Today     Code Description Service Date Service Provider Modifiers Qty    [id number]  PT THER PROC EA 15 MIN 4/27/2020 Parminder Oleary, PT GP 1    [id number]  PT MANUAL THERAPY EA 15 MIN 4/27/2020 Parminder Oleary, PT GP 1                    Parminder Oleary, PT  4/27/2020

## 2020-04-30 ENCOUNTER — HOSPITAL ENCOUNTER (OUTPATIENT)
Dept: PHYSICAL THERAPY | Facility: HOSPITAL | Age: 65
Setting detail: THERAPIES SERIES
Discharge: HOME | End: 2020-04-30

## 2020-04-30 DIAGNOSIS — M25.561 CHRONIC PAIN OF RIGHT KNEE: Primary | ICD-10-CM

## 2020-04-30 DIAGNOSIS — R26.2 DIFFICULTY WALKING: ICD-10-CM

## 2020-04-30 DIAGNOSIS — R53.1 WEAKNESS: ICD-10-CM

## 2020-04-30 DIAGNOSIS — G89.29 CHRONIC PAIN OF RIGHT KNEE: Primary | ICD-10-CM

## 2020-04-30 DIAGNOSIS — Z47.89 ORTHOPEDIC AFTERCARE: ICD-10-CM

## 2020-04-30 PROCEDURE — 97110 THERAPEUTIC EXERCISES: CPT | Performed by: PHYSICAL THERAPIST

## 2020-04-30 PROCEDURE — 97140 MANUAL THERAPY 1/> REGIONS: CPT | Performed by: PHYSICAL THERAPIST

## 2020-05-04 ENCOUNTER — APPOINTMENT (OUTPATIENT)
Dept: PHYSICAL THERAPY | Facility: HOSPITAL | Age: 65
End: 2020-05-04

## 2020-05-07 ENCOUNTER — HOSPITAL ENCOUNTER (OUTPATIENT)
Dept: PHYSICAL THERAPY | Facility: HOSPITAL | Age: 65
Setting detail: THERAPIES SERIES
Discharge: HOME | End: 2020-05-07

## 2020-05-07 DIAGNOSIS — G89.29 CHRONIC PAIN OF RIGHT KNEE: Primary | ICD-10-CM

## 2020-05-07 DIAGNOSIS — Z47.89 ORTHOPEDIC AFTERCARE: ICD-10-CM

## 2020-05-07 DIAGNOSIS — M25.561 CHRONIC PAIN OF RIGHT KNEE: Primary | ICD-10-CM

## 2020-05-07 DIAGNOSIS — R53.1 WEAKNESS: ICD-10-CM

## 2020-05-07 DIAGNOSIS — R26.2 DIFFICULTY WALKING: ICD-10-CM

## 2020-05-07 PROCEDURE — 97140 MANUAL THERAPY 1/> REGIONS: CPT | Performed by: PHYSICAL THERAPIST

## 2020-05-07 NOTE — THERAPY TREATMENT NOTE
Outpatient Physical Therapy Ortho Treatment Note  [facility]     Patient Name: Ava Lagos  : 1955  MRN: [id number]  Today's Date: 2020      Visit Date: 2020    Visit Dx:    ICD-10-CM ICD-9-CM   1. Chronic pain of right knee M25.561 719.46    G89.29 338.29   2. Weakness R53.1 780.79   3. Difficulty walking R26.2 719.7   4. Orthopedic aftercare Z47.89 V54.9       Patient Active Problem List   Diagnosis   • Total knee replacement status        Past Medical History:   Diagnosis Date   • Arthritis    • COPD (chronic obstructive pulmonary disease) (CMS/[facility])    • Diabetes mellitus (CMS/[facility])    • Diverticulitis    • Heart murmur    • Neck pain    • Right knee pain         Past Surgical History:   Procedure Laterality Date   • BUNIONECTOMY Bilateral    •  SECTION     • COLONOSCOPY     • GALLBLADDER SURGERY     • KNEE ARTHROSCOPY Right    • OVARIAN CYST SURGERY     • TOTAL KNEE ARTHROPLASTY Right 2020    Procedure: TOTAL KNEE ARTHROPLASTY;  Surgeon: Robbin Jennings II, MD;  Location: [facility];  Service: Orthopedics;  Laterality: Right;   • UMBILICAL HERNIA REPAIR                         PT Assessment/Plan     Row Name 20 1641          PT Assessment    Assessment Comments  Ms. Lagos is 11 weeks s/p R TKA. 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 She has been back to work 4 days now, reporting increased pain/stiffness.  We performed STM to RLE tissues, noting improved patellar mobility post manual and improved tissue pliability post manual.  Likely to perform more tissue/pain mitigating treatment over the next week as she acclimates to being at work.  Ms. Lagos continues to be a good candidate for skilled physical therapy.   -GJ        PT Plan    PT Plan Comments  likely work on STM of RLE next session, may peform ROM activities on Monday depending on response to RTW  -GJ       User Key  (r) = Recorded By, (t) =  Taken By, (c) = Cosigned By    Initials Name Provider Type    Parminder Ziegler, PT Physical Therapist            OP Exercises     Row Name 05/07/20 1557 05/07/20 1500          Subjective Comments    Subjective Comments  --  I'm back to work, I have been tired.  My knee/hip is sore.    -GJ        Total Minutes    76040 - PT Manual Therapy Minutes  29  -GJ  --        Exercise 2    Exercise Name 2  --  Nustep  -GJ     Time 2  --  4 min  -GJ       User Key  (r) = Recorded By, (t) = Taken By, (c) = Cosigned By    Initials Name Provider Type    Parminder Ziegler, PT Physical Therapist                      Manual Rx (last 36 hours)      Manual Treatments     Row Name 05/07/20 1557             Total Minutes    90155 - PT Manual Therapy Minutes  29  -GJ         Manual Rx 3    Manual Rx 3 Location  STM/deep massage R glut med/piriformis tissu  -GJ      Manual Rx 3 Type  pt L SL with pillow between knees  -GJ         Manual Rx 4    Manual Rx 4 Location  STM R HS, quad, gastroc tissue, pt supine  -GJ        User Key  (r) = Recorded By, (t) = Taken By, (c) = Cosigned By    Initials Name Provider Type    Parminder Ziegler, PT Physical Therapist          PT OP Goals     Row Name 05/07/20 1500          PT Short Term Goals    STG Date to Achieve  03/25/20  -GJ     STG 1  The pt will demonstrate IND and compliant with initial HEP focused on R knee AROM.  -GJ     STG 1 Progress  Met  -GJ     STG 2  The pt will demonstrate R knee AROM 0-120 for improved functional mobility and gait pattern.  -GJ     STG 2 Progress  Progressing  -GJ     STG 3  The pt will ambulate with SPC and demonstrate near normal gait pattern/ heel strike.  -GJ     STG 3 Progress  Met  -GJ     STG 4  The pt will perform 10 IND SLR without quad lag noted for improved quad activation.  -GJ     STG 4 Progress  Progressing;Partially Met  -GJ        Long Term Goals    LTG Date to Achieve  05/03/20  -GJ     LTG 1  The pt will demonstrate IND and compliant with  progressive HEP focused on IND condition management and return to PLOF.  -GJ     LTG 1 Progress  Partially Met  -GJ     LTG 2  The pt will resume reciprocal stair navigation for improved home navigation.  -GJ     LTG 2 Progress  Ongoing;Partially Met  -GJ     LTG 3  The pt will score greater than or equal to 55% ability on the KOS to indicate imptoved perceived performance of ADLs.  -GJ     LTG 3 Progress  Met  -GJ     LTG 4  The pt will resume walking over uneven ground in backyard without AD for return to recreational activity performance (caring for chickens, woodworking).  -GJ     LTG 4 Progress  Ongoing  -GJ     LTG 5  The pt will tolerate prolonged standing/ walking at least 30 min for improved community navigation and potential return to work activities.  -GJ     LTG 5 Progress  Met  -GJ       User Key  (r) = Recorded By, (t) = Taken By, (c) = Cosigned By    Initials Name Provider Type     Parminder Oleary, PT Physical Therapist          Therapy Education  Education Details: discsussed activity modifications with return to work  Given: HEP, Symptoms/condition management, Pain management, Mobility training  How Provided: Verbal  Provided to: Patient  Level of Understanding: Verbalized              Time Calculation:   Start Time: 1600  Stop Time: 1635  Time Calculation (min): 35 min  Therapy Charges for Today     Code Description Service Date Service Provider Modifiers Qty    [id number] HC PT MANUAL THERAPY EA 15 MIN 5/7/2020 Parminder Oleary, PT GP 2                    Parminder Oleary, PT  5/7/2020

## 2020-05-14 ENCOUNTER — HOSPITAL ENCOUNTER (OUTPATIENT)
Dept: PHYSICAL THERAPY | Facility: HOSPITAL | Age: 65
Setting detail: THERAPIES SERIES
Discharge: HOME | End: 2020-05-14

## 2020-05-14 DIAGNOSIS — R53.1 WEAKNESS: ICD-10-CM

## 2020-05-14 DIAGNOSIS — Z47.89 ORTHOPEDIC AFTERCARE: ICD-10-CM

## 2020-05-14 DIAGNOSIS — G89.29 CHRONIC PAIN OF RIGHT KNEE: Primary | ICD-10-CM

## 2020-05-14 DIAGNOSIS — M25.561 CHRONIC PAIN OF RIGHT KNEE: Primary | ICD-10-CM

## 2020-05-14 DIAGNOSIS — R26.2 DIFFICULTY WALKING: ICD-10-CM

## 2020-05-14 PROCEDURE — 97140 MANUAL THERAPY 1/> REGIONS: CPT | Performed by: PHYSICAL THERAPIST

## 2020-05-14 NOTE — THERAPY PROGRESS REPORT/RE-CERT
Outpatient Physical Therapy Ortho Progress Note  [facility]     Patient Name: Ava Lagos  : 1955  MRN: [id number]  Today's Date: 2020      Visit Date: 2020    Visit Dx:    ICD-10-CM ICD-9-CM   1. Chronic pain of right knee M25.561 719.46    G89.29 338.29   2. Weakness R53.1 780.79   3. Difficulty walking R26.2 719.7   4. Orthopedic aftercare Z47.89 V54.9       Patient Active Problem List   Diagnosis   • Total knee replacement status        Past Medical History:   Diagnosis Date   • Arthritis    • COPD (chronic obstructive pulmonary disease) (CMS/[facility])    • Diabetes mellitus (CMS/[facility])    • Diverticulitis    • Heart murmur    • Neck pain    • Right knee pain         Past Surgical History:   Procedure Laterality Date   • BUNIONECTOMY Bilateral    •  SECTION     • COLONOSCOPY     • GALLBLADDER SURGERY     • KNEE ARTHROSCOPY Right    • OVARIAN CYST SURGERY     • TOTAL KNEE ARTHROPLASTY Right 2020    Procedure: TOTAL KNEE ARTHROPLASTY;  Surgeon: Robbin Jennings II, MD;  Location: [facility];  Service: Orthopedics;  Laterality: Right;   • UMBILICAL HERNIA REPAIR                         PT Assessment/Plan     Row Name 20 1639          PT Assessment    Functional Limitations  Impaired gait;Limitations in community activities;Limitation in home management;Performance in leisure activities;Performance in work activities  -GJ     Impairments  Range of motion;Pain;Impaired muscle endurance;Gait;Muscle strength;Poor body mechanics;Impaired flexibility;Impaired muscle length  -GJ     Assessment Comments  Ms. Lagos is 12 weeks s/p R TKA.  She has attended 18 sessions of physical therapy to date. She has returned to work as an RN in the OR, 8+ hour days standing.  Over the past 2 weeks she has presented with increased soreness, increased muscle tone, swelling, expected after return to work of this nature. We performed soft tissue work to facilitate improved  tissue response to return to work. She does demonstrate decreased extensor lag with SLR (~ 5 degrees now vs. nearly 15 several weeks ago). Her gait pattern is improved, and she is ambulating without AD, mild limp/decreased flexion through swing phase.  Ms. Lagos is progressing toward all remaining functional goals at this time.  She remains a good candidate for skilled physical therapy.   -GJ     Please refer to paper survey for additional self-reported information  Yes  -GJ     Rehab Potential  Good  -GJ     Patient/caregiver participated in establishment of treatment plan and goals  Yes  -GJ     Patient would benefit from skilled therapy intervention  Yes  -GJ        PT Plan    PT Frequency  1x/week;2x/week  -GJ     Predicted Duration of Therapy Intervention (Therapy Eval)  4-6 sessions   -GJ     Planned CPT's?  PT RE-EVAL: 83682;PT THER ACT EA 15 MIN: 17264;PT MANUAL THERAPY EA 15 MIN: 99049;PT NEUROMUSC RE-EDUCATION EA 15 MIN: 92008;PT GAIT TRAINING EA 15 MIN: 51953;PT HOT OR COLD PACK TREAT MCARE;PT ELECTRICAL STIM UNATTEND: ;PT ULTRASOUND EA 15 MIN: 73248  -GJ     PT Plan Comments  assess respose to STM/work, likely repeat STM for optimal tissue quality/healing, advance strengthening ROM as tolerable   -GJ       User Key  (r) = Recorded By, (t) = Taken By, (c) = Cosigned By    Initials Name Provider Type    Parminder Ziegler, PT Physical Therapist            OP Exercises     Row Name 05/14/20 1602 05/14/20 1600          Subjective Comments    Subjective Comments  --  my calf is so stif/sor, my knee is sore  -GJ        Subjective Pain    Pre-Treatment Pain Level  --  3  -GJ        Total Minutes    25604 - PT Manual Therapy Minutes  28  -GJ  --        Exercise 2    Exercise Name 2  --  Nustep  -GJ     Time 2  --  4 min  -GJ       User Key  (r) = Recorded By, (t) = Taken By, (c) = Cosigned By    Initials Name Provider Type    Parminder Ziegler, PT Physical Therapist                      Manual Rx  (last 36 hours)      Manual Treatments     Row Name 05/14/20 1602             Total Minutes    46370 - PT Manual Therapy Minutes  28  -GJ         Manual Rx 4    Manual Rx 4 Location  STM R HS, quad, gastroc tissue, pt prone/supine  -        User Key  (r) = Recorded By, (t) = Taken By, (c) = Cosigned By    Initials Name Provider Type    GJ Parminder Oleary, PT Physical Therapist          PT OP Goals     Row Name 05/14/20 1600          PT Short Term Goals    STG Date to Achieve  03/25/20  -GJ     STG 1  The pt will demonstrate IND and compliant with initial HEP focused on R knee AROM.  -GJ     STG 1 Progress  Met  -GJ     STG 2  The pt will demonstrate R knee AROM 0-120 for improved functional mobility and gait pattern.  -GJ     STG 2 Progress  Progressing  -GJ     STG 3  The pt will ambulate with SPC and demonstrate near normal gait pattern/ heel strike.  -GJ     STG 3 Progress  Met  -GJ     STG 4  The pt will perform 10 IND SLR without quad lag noted for improved quad activation.  -GJ     STG 4 Progress  Progressing;Partially Met  -GJ     STG 4 Progress Comments  5 degree extensor lag, this is progressing  -        Long Term Goals    LTG Date to Achieve  07/03/20  -GJ     LTG 1  The pt will demonstrate IND and compliant with progressive HEP focused on IND condition management and return to PLOF.  -GJ     LTG 1 Progress  Partially Met  -GJ     LTG 2  The pt will resume reciprocal stair navigation for improved home navigation.  -GJ     LTG 2 Progress  Ongoing;Partially Met  -GJ     LTG 3  The pt will score greater than or equal to 55% ability on the KOS to indicate imptoved perceived performance of ADLs.  -GJ     LTG 3 Progress  Met  -GJ     LTG 4  The pt will resume walking over uneven ground in backyard without AD for return to recreational activity performance (caring for chickens, woodworking).  -GJ     LTG 4 Progress  Met  -GJ     LTG 5  The pt will tolerate prolonged standing/ walking at least 30 min for  improved community navigation and potential return to work activities.  -SHAHLA     LTG 5 Progress  Met  -GJ       User Key  (r) = Recorded By, (t) = Taken By, (c) = Cosigned By    Initials Name Provider Type    Parminder Ziegler, PT Physical Therapist          Therapy Education  Education Details: discussed activity modifications, encouraged her to wear her support stockings at work,use of ice  Given: Symptoms/condition management, Pain management, Mobility training, Posture/body mechanics, Edema management  Program: Reinforced  How Provided: Verbal  Provided to: Patient  Level of Understanding: Verbalized              Time Calculation:   Start Time: 1600  Stop Time: 1632  Time Calculation (min): 32 min  Therapy Charges for Today     Code Description Service Date Service Provider Modifiers Qty    53865792166 HC PT MANUAL THERAPY EA 15 MIN 5/14/2020 Parminder Oleary, PT GP 2                    Parminder Oleary, PT  5/14/2020

## 2020-05-21 ENCOUNTER — HOSPITAL ENCOUNTER (OUTPATIENT)
Dept: PHYSICAL THERAPY | Facility: HOSPITAL | Age: 65
Setting detail: THERAPIES SERIES
Discharge: HOME | End: 2020-05-21

## 2020-05-21 DIAGNOSIS — Z47.89 ORTHOPEDIC AFTERCARE: ICD-10-CM

## 2020-05-21 DIAGNOSIS — G89.29 CHRONIC PAIN OF RIGHT KNEE: Primary | ICD-10-CM

## 2020-05-21 DIAGNOSIS — R26.2 DIFFICULTY WALKING: ICD-10-CM

## 2020-05-21 DIAGNOSIS — M25.561 CHRONIC PAIN OF RIGHT KNEE: Primary | ICD-10-CM

## 2020-05-21 DIAGNOSIS — R53.1 WEAKNESS: ICD-10-CM

## 2020-05-21 PROCEDURE — 97140 MANUAL THERAPY 1/> REGIONS: CPT | Performed by: PHYSICAL THERAPIST

## 2020-05-21 NOTE — THERAPY TREATMENT NOTE
Outpatient Physical Therapy Ortho Treatment Note  [facility]     Patient Name: Ava Lagos  : 1955  MRN: [id number]  Today's Date: 2020      Visit Date: 2020    Visit Dx:    ICD-10-CM ICD-9-CM   1. Chronic pain of right knee M25.561 719.46    G89.29 338.29   2. Weakness R53.1 780.79   3. Difficulty walking R26.2 719.7   4. Orthopedic aftercare Z47.89 V54.9       Patient Active Problem List   Diagnosis   • Total knee replacement status        Past Medical History:   Diagnosis Date   • Arthritis    • COPD (chronic obstructive pulmonary disease) (CMS/[facility])    • Diabetes mellitus (CMS/[facility])    • Diverticulitis    • Heart murmur    • Neck pain    • Right knee pain         Past Surgical History:   Procedure Laterality Date   • BUNIONECTOMY Bilateral    •  SECTION     • COLONOSCOPY     • GALLBLADDER SURGERY     • KNEE ARTHROSCOPY Right    • OVARIAN CYST SURGERY     • TOTAL KNEE ARTHROPLASTY Right 2020    Procedure: TOTAL KNEE ARTHROPLASTY;  Surgeon: Robbin Jennings II, MD;  Location: [facility];  Service: Orthopedics;  Laterality: Right;   • UMBILICAL HERNIA REPAIR                         PT Assessment/Plan     Row Name 20 7464          PT Assessment    Assessment Comments  Ms. Lagos is 13 weeks s/p R TKA.  She reports improved condition with return to work.  She demonstrates minimal (3 degree) extensor lag, is ascendiing/descending stairs reciprocally without rail, mild hesitation with descendiing.  She demonstrates no change in temp from R to L knee.  Ms. Lagos will likely be ready for independent management following next session, she is in agreement with this plan.   -SHAHLA        PT Plan    PT Plan Comments  likely DC to HEp following next session.   -SHAHLA       User Key  (r) = Recorded By, (t) = Taken By, (c) = Cosigned By    Initials Name Provider Type    Parminder Ziegler, PT Physical Therapist            OP Exercises     Row Name 20 8566  05/21/20 1500          Subjective Comments    Subjective Comments  --  I'm feeling better, but i'm still prettty tired  -GJ        Subjective Pain    Pre-Treatment Pain Level  --  2  -GJ        Total Minutes    61660 - PT Manual Therapy Minutes  30  -GJ  --        Exercise 2    Exercise Name 2  --  Nustep  -GJ     Time 2  --  4 min  -GJ       User Key  (r) = Recorded By, (t) = Taken By, (c) = Cosigned By    Initials Name Provider Type    Parminder Ziegler, PT Physical Therapist                      Manual Rx (last 36 hours)      Manual Treatments     Row Name 05/21/20 1556             Total Minutes    92096 - PT Manual Therapy Minutes  30  -GJ         Manual Rx 4    Manual Rx 4 Location  STM R HS, quad, gastroc tissue, pt prone/supine  -GJ        User Key  (r) = Recorded By, (t) = Taken By, (c) = Cosigned By    Initials Name Provider Type    Parminder Ziegler, PT Physical Therapist          PT OP Goals     Row Name 05/21/20 1500          PT Short Term Goals    STG Date to Achieve  03/25/20  -GJ     STG 1  The pt will demonstrate IND and compliant with initial HEP focused on R knee AROM.  -GJ     STG 1 Progress  Met  -GJ     STG 2  The pt will demonstrate R knee AROM 0-120 for improved functional mobility and gait pattern.  -GJ     STG 2 Progress  Progressing  -GJ     STG 3  The pt will ambulate with SPC and demonstrate near normal gait pattern/ heel strike.  -GJ     STG 3 Progress  Met  -GJ     STG 4  The pt will perform 10 IND SLR without quad lag noted for improved quad activation.  -GJ     STG 4 Progress  Progressing;Partially Met  -GJ     STG 4 Progress Comments  3 degree extensor lag with SLR, improvinh  -GJ        Long Term Goals    LTG Date to Achieve  07/03/20  -GJ     LTG 1  The pt will demonstrate IND and compliant with progressive HEP focused on IND condition management and return to PLOF.  -GJ     LTG 1 Progress  Partially Met  -GJ     LTG 2  The pt will resume reciprocal stair navigation for improved  home navigation.  -GJ     LTG 2 Progress  Met  -GJ     LTG 3  The pt will score greater than or equal to 55% ability on the KOS to indicate imptoved perceived performance of ADLs.  -GJ     LTG 3 Progress  Met  -GJ     LTG 4  The pt will resume walking over uneven ground in backyard without AD for return to recreational activity performance (caring for chickens, woodworking).  -GJ     LTG 4 Progress  Met  -GJ     LTG 5  The pt will tolerate prolonged standing/ walking at least 30 min for improved community navigation and potential return to work activities.  -GJ     LTG 5 Progress  Met  -GJ       User Key  (r) = Recorded By, (t) = Taken By, (c) = Cosigned By    Initials Name Provider Type     Parmindre Oleary, PT Physical Therapist          Therapy Education  Education Details: discussed likely DC to HEP following next session  Given: HEP, Symptoms/condition management, Pain management, Mobility training, Posture/body mechanics  Program: Reinforced  How Provided: Verbal  Provided to: Patient  Level of Understanding: Teach back education performed, Verbalized, Demonstrated              Time Calculation:   Start Time: 1600  Stop Time: 1635  Time Calculation (min): 35 min  Therapy Charges for Today     Code Description Service Date Service Provider Modifiers Qty    68230830650 HC PT MANUAL THERAPY EA 15 MIN 5/21/2020 Parminder Oleary, PT GP 2                    Parminder Oleary, PT  5/21/2020

## 2020-05-27 ENCOUNTER — HOSPITAL ENCOUNTER (OUTPATIENT)
Dept: PHYSICAL THERAPY | Facility: HOSPITAL | Age: 65
Setting detail: THERAPIES SERIES
Discharge: HOME | End: 2020-05-27

## 2020-05-27 DIAGNOSIS — R26.2 DIFFICULTY WALKING: ICD-10-CM

## 2020-05-27 DIAGNOSIS — M25.561 CHRONIC PAIN OF RIGHT KNEE: Primary | ICD-10-CM

## 2020-05-27 DIAGNOSIS — R53.1 WEAKNESS: ICD-10-CM

## 2020-05-27 DIAGNOSIS — Z47.89 ORTHOPEDIC AFTERCARE: ICD-10-CM

## 2020-05-27 DIAGNOSIS — G89.29 CHRONIC PAIN OF RIGHT KNEE: Primary | ICD-10-CM

## 2020-05-27 PROCEDURE — 97140 MANUAL THERAPY 1/> REGIONS: CPT | Performed by: PHYSICAL THERAPIST

## 2020-05-27 PROCEDURE — 97530 THERAPEUTIC ACTIVITIES: CPT | Performed by: PHYSICAL THERAPIST

## 2020-05-27 NOTE — THERAPY DISCHARGE NOTE
Outpatient Physical Therapy Ortho Treatment Note/Discharge Summary  [facility]     Patient Name: Ava Lagos  : 1955  MRN: [id number]  Today's Date: 2020      Visit Date: 2020    Visit Dx:    ICD-10-CM ICD-9-CM   1. Chronic pain of right knee M25.561 719.46    G89.29 338.29   2. Weakness R53.1 780.79   3. Difficulty walking R26.2 719.7   4. Orthopedic aftercare Z47.89 V54.9       Patient Active Problem List   Diagnosis   • Total knee replacement status        Past Medical History:   Diagnosis Date   • Arthritis    • COPD (chronic obstructive pulmonary disease) (CMS/[facility])    • Diabetes mellitus (CMS/[facility])    • Diverticulitis    • Heart murmur    • Neck pain    • Right knee pain         Past Surgical History:   Procedure Laterality Date   • BUNIONECTOMY Bilateral    •  SECTION     • COLONOSCOPY     • GALLBLADDER SURGERY     • KNEE ARTHROSCOPY Right    • OVARIAN CYST SURGERY     • TOTAL KNEE ARTHROPLASTY Right 2020    Procedure: TOTAL KNEE ARTHROPLASTY;  Surgeon: Robbin Jennings II, MD;  Location: [facility];  Service: Orthopedics;  Laterality: Right;   • UMBILICAL HERNIA REPAIR         PT Ortho     Row Name 20 1500       Right Lower Ext    Rt Knee Extension/Flexion AROM  10-90  -GJ    Rt Knee Extension/Flexion PROM  0-110  -GJ      User Key  (r) = Recorded By, (t) = Taken By, (c) = Cosigned By    Initials Name Provider Type     Parminder Oleary, PT Physical Therapist                      PT Assessment/Plan     Row Name 20 0255          PT Assessment    Assessment Comments  Ms. Lagos attended 20 sessions of physical therapy from 3/4/2020-2020 following her R TKA.  She is independent with her HEP, verbalizes a good understanding of her condition, has returned to work and has met all but 2 functional goals. 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 Today, she demonstrates decreased ROM of the R knee vs. previous visits, may be secondary to  increased work demand/swelling of R knee related to return to work.  We reviewed her gait pattern today.  She is to perform strengthening BIW, stretches daily.  She is encouraged to call with questions. Ms. Lagos is discharged from outpatient physical therapy to an independent program.     -        PT Plan    PT Plan Comments  DC to HEP, strenghtening to be BIW, stretches daily, call with questions.   -       User Key  (r) = Recorded By, (t) = Taken By, (c) = Cosigned By    Initials Name Provider Type    Parminder Ziegler, PT Physical Therapist              OP Exercises     Row Name 05/27/20 1555 05/27/20 1500          Subjective Comments    Subjective Comments  --  my knee is tender, I did a lot of stairs yesterday.   -        Subjective Pain    Pre-Treatment Pain Level  --  2  -GJ        Total Minutes    62321 - PT Therapeutic Activity Minutes  10  -GJ  --     23431 - PT Manual Therapy Minutes  30  -GJ  --        Exercise 2    Exercise Name 2  --  Nustep  -GJ     Time 2  --  4 min  -       User Key  (r) = Recorded By, (t) = Taken By, (c) = Cosigned By    Initials Name Provider Type    Parminder Ziegler, PT Physical Therapist                        Manual Rx (last 36 hours)      Manual Treatments     Row Name 05/27/20 1700 05/27/20 1555          Total Minutes    16234 - PT Manual Therapy Minutes  --  30  -GJ        Manual Rx 4    Manual Rx 4 Location  STM R HS, quad, gastroc tissue, pt prone/supine  -  --       User Key  (r) = Recorded By, (t) = Taken By, (c) = Cosigned By    Initials Name Provider Type    Parminder Ziegler, PT Physical Therapist          PT OP Goals     Row Name 05/27/20 1500          PT Short Term Goals    STG Date to Achieve  03/25/20  -     STG 1  The pt will demonstrate IND and compliant with initial HEP focused on R knee AROM.  -     STG 1 Progress  Met  -     STG 2  The pt will demonstrate R knee AROM 0-120 for improved functional mobility and gait pattern.  -      STG 2 Progress  Not Met  -GJ     STG 3  The pt will ambulate with SPC and demonstrate near normal gait pattern/ heel strike.  -GJ     STG 3 Progress  Met  -GJ     STG 4  The pt will perform 10 IND SLR without quad lag noted for improved quad activation.  -GJ     STG 4 Progress  Not Met  -GJ        Long Term Goals    LTG Date to Achieve  07/03/20  -GJ     LTG 1  The pt will demonstrate IND and compliant with progressive HEP focused on IND condition management and return to PLOF.  -GJ     LTG 1 Progress  Met  -GJ     LTG 2  The pt will resume reciprocal stair navigation for improved home navigation.  -GJ     LTG 2 Progress  Met  -GJ     LTG 3  The pt will score greater than or equal to 55% ability on the KOS to indicate imptoved perceived performance of ADLs.  -GJ     LTG 3 Progress  Met  -GJ     LTG 4  The pt will resume walking over uneven ground in backyard without AD for return to recreational activity performance (caring for chickens, woodworking).  -GJ     LTG 4 Progress  Met  -GJ     LTG 5  The pt will tolerate prolonged standing/ walking at least 30 min for improved community navigation and potential return to work activities.  -GJ     LTG 5 Progress  Met  -GJ       User Key  (r) = Recorded By, (t) = Taken By, (c) = Cosigned By    Initials Name Provider Type    Parminder Ziegler, PT Physical Therapist          Therapy Education  Education Details: re-issued pictures for HEP, HEP to be performed BIW, reviewed heel to toe gait pattern, encouraged pt to call with questions.   Given: Symptoms/condition management, Pain management, HEP, Mobility training, Posture/body mechanics, Edema management  Program: Reinforced  How Provided: Verbal, Demonstration, Written  Provided to: Patient  Level of Understanding: Teach back education performed, Verbalized, Demonstrated              Time Calculation:   Start Time: 1555  Stop Time: 1640  Time Calculation (min): 45 min  Therapy Charges for Today     Code Description  Service Date Service Provider Modifiers Qty    97023015178  PT THERAPEUTIC ACT EA 15 MIN 5/27/2020 Parminder Oleary, PT GP 1    16362645231 HC PT MANUAL THERAPY EA 15 MIN 5/27/2020 Parminder Oleary, PT GP 2                OP PT Discharge Summary  Date of Discharge: 05/27/20  Reason for Discharge: Independent  Outcomes Achieved: Patient able to partially acheive established goals  Discharge Destination: Home with home program  Discharge Instructions/Additional Comments: strenghtening to be BIW, stretches daily, call with questions.       Parminder Oleary, PT  5/27/2020

## 2021-03-22 ENCOUNTER — BULK ORDERING (OUTPATIENT)
Dept: CASE MANAGEMENT | Facility: OTHER | Age: 66
End: 2021-03-22

## 2021-03-22 DIAGNOSIS — Z23 IMMUNIZATION DUE: ICD-10-CM

## 2021-06-23 ENCOUNTER — APPOINTMENT (OUTPATIENT)
Dept: GENERAL RADIOLOGY | Facility: HOSPITAL | Age: 66
End: 2021-06-23

## 2021-06-23 ENCOUNTER — APPOINTMENT (OUTPATIENT)
Dept: CT IMAGING | Facility: HOSPITAL | Age: 66
End: 2021-06-23

## 2021-06-23 ENCOUNTER — HOSPITAL ENCOUNTER (INPATIENT)
Facility: HOSPITAL | Age: 66
LOS: 4 days | Discharge: HOME | End: 2021-06-27
Attending: EMERGENCY MEDICINE | Admitting: INTERNAL MEDICINE

## 2021-06-23 DIAGNOSIS — R51.9 ACUTE NONINTRACTABLE HEADACHE, UNSPECIFIED HEADACHE TYPE: ICD-10-CM

## 2021-06-23 DIAGNOSIS — B02.8 HERPES ZOSTER WITH OTHER COMPLICATION: ICD-10-CM

## 2021-06-23 DIAGNOSIS — G03.9 MENINGITIS: Primary | ICD-10-CM

## 2021-06-23 PROBLEM — E11.65 TYPE 2 DIABETES MELLITUS WITH HYPERGLYCEMIA: Status: ACTIVE | Noted: 2021-06-23

## 2021-06-23 PROBLEM — E66.9 OBESITY (BMI 30-39.9): Status: ACTIVE | Noted: 2021-06-23

## 2021-06-23 PROBLEM — B02.1 HERPES ZOSTER WITH MENINGITIS: Status: ACTIVE | Noted: 2021-06-23

## 2021-06-23 LAB
ALBUMIN SERPL-MCNC: 3.8 G/DL (ref 3.5–5.2)
ALBUMIN/GLOB SERPL: 1.3 G/DL
ALP SERPL-CCNC: 90 U/L (ref 39–117)
ALT SERPL W P-5'-P-CCNC: 17 U/L (ref 1–33)
ANION GAP SERPL CALCULATED.3IONS-SCNC: 6 MMOL/L (ref 5–15)
APPEARANCE CSF: CLEAR
AST SERPL-CCNC: 17 U/L (ref 1–32)
BASOPHILS # BLD AUTO: 0.05 10*3/MM3 (ref 0–0.2)
BASOPHILS NFR BLD AUTO: 0.8 % (ref 0–1.5)
BILIRUB SERPL-MCNC: 0.2 MG/DL (ref 0–1.2)
BUN SERPL-MCNC: 8 MG/DL (ref 8–23)
BUN/CREAT SERPL: 10.4 (ref 7–25)
C GATTII+NEOFOR DNA CSF QL NAA+NON-PROBE: NOT DETECTED
CALCIUM SPEC-SCNC: 8 MG/DL (ref 8.6–10.5)
CHLORIDE SERPL-SCNC: 101 MMOL/L (ref 98–107)
CMV DNA CSF QL NAA+PROBE: NOT DETECTED
CO2 SERPL-SCNC: 28 MMOL/L (ref 22–29)
COLOR CSF: COLORLESS
CREAT SERPL-MCNC: 0.77 MG/DL (ref 0.57–1)
D-LACTATE SERPL-SCNC: 0.9 MMOL/L (ref 0.5–2)
DEPRECATED RDW RBC AUTO: 44.6 FL (ref 37–54)
E COLI K1 DNA CSF QL NAA+NON-PROBE: NOT DETECTED
EOSINOPHIL # BLD AUTO: 0.05 10*3/MM3 (ref 0–0.4)
EOSINOPHIL NFR BLD AUTO: 0.8 % (ref 0.3–6.2)
ERYTHROCYTE [DISTWIDTH] IN BLOOD BY AUTOMATED COUNT: 14 % (ref 12.3–15.4)
EV RNA CSF QL NAA+PROBE: NOT DETECTED
GFR SERPL CREATININE-BSD FRML MDRD: 91 ML/MIN/1.73
GLOBULIN UR ELPH-MCNC: 2.9 GM/DL
GLUCOSE BLDC GLUCOMTR-MCNC: 216 MG/DL (ref 70–130)
GLUCOSE CSF-MCNC: 66 MG/DL (ref 40–70)
GLUCOSE SERPL-MCNC: 141 MG/DL (ref 65–99)
GP B STREP DNA SPEC QL NAA+PROBE: NOT DETECTED
HAEM INFLU SEROTYP DNA SPEC NAA+PROBE: NOT DETECTED
HCT VFR BLD AUTO: 41 % (ref 34–46.6)
HGB BLD-MCNC: 14 G/DL (ref 12–15.9)
HHV6 DNA CSF QL NAA+PROBE: NOT DETECTED
HSV1 DNA CSF QL NAA+PROBE: NOT DETECTED
HSV2 DNA CSF QL NAA+PROBE: NOT DETECTED
IMM GRANULOCYTES # BLD AUTO: 0.02 10*3/MM3 (ref 0–0.05)
IMM GRANULOCYTES NFR BLD AUTO: 0.3 % (ref 0–0.5)
INR PPP: 1.02 (ref 0.9–1.1)
L MONOCYTOG RRNA SPEC QL PROBE: NOT DETECTED
LYMPHOCYTES # BLD AUTO: 0.81 10*3/MM3 (ref 0.7–3.1)
LYMPHOCYTES NFR BLD AUTO: 12.9 % (ref 19.6–45.3)
LYMPHOCYTES NFR CSF MANUAL: 66 %
MCH RBC QN AUTO: 30 PG (ref 26.6–33)
MCHC RBC AUTO-ENTMCNC: 34.1 G/DL (ref 31.5–35.7)
MCV RBC AUTO: 88 FL (ref 79–97)
METHOD: ABNORMAL
MONOCYTES # BLD AUTO: 0.36 10*3/MM3 (ref 0.1–0.9)
MONOCYTES NFR BLD AUTO: 5.7 % (ref 5–12)
MONOCYTES NFR CSF MANUAL: 19 %
N MEN DNA SPEC QL NAA+PROBE: NOT DETECTED
NEUTROPHILS NFR BLD AUTO: 5 10*3/MM3 (ref 1.7–7)
NEUTROPHILS NFR BLD AUTO: 79.5 % (ref 42.7–76)
NEUTROPHILS NFR CSF MICRO: 15 %
NRBC BLD AUTO-RTO: 0 /100 WBC (ref 0–0.2)
NUC CELL # CSF MANUAL: 95 /MM3 (ref 0–5)
PARECHOVIRUS A RNA CSF QL NAA+NON-PROBE: NOT DETECTED
PLATELET # BLD AUTO: 209 10*3/MM3 (ref 140–450)
PMV BLD AUTO: 10.3 FL (ref 6–12)
POTASSIUM SERPL-SCNC: 4 MMOL/L (ref 3.5–5.2)
PROT CSF-MCNC: 81 MG/DL (ref 15–45)
PROT SERPL-MCNC: 6.7 G/DL (ref 6–8.5)
PROTHROMBIN TIME: 13.2 SECONDS (ref 11.7–14.2)
RBC # BLD AUTO: 4.66 10*6/MM3 (ref 3.77–5.28)
RBC # CSF MANUAL: 1 /MM3 (ref 0–0)
S PNEUM DNA CSF QL NAA+NON-PROBE: NOT DETECTED
SARS-COV-2 RNA RESP QL NAA+PROBE: NOT DETECTED
SODIUM SERPL-SCNC: 135 MMOL/L (ref 136–145)
TUBE # CSF: 4
VZV DNA CSF QL NAA+PROBE: DETECTED
WBC # BLD AUTO: 6.29 10*3/MM3 (ref 3.4–10.8)

## 2021-06-23 PROCEDURE — 71045 X-RAY EXAM CHEST 1 VIEW: CPT

## 2021-06-23 PROCEDURE — 89051 BODY FLUID CELL COUNT: CPT | Performed by: NURSE PRACTITIONER

## 2021-06-23 PROCEDURE — 25010000002 ACYCLOVIR PER 5 MG: Performed by: NURSE PRACTITIONER

## 2021-06-23 PROCEDURE — 84157 ASSAY OF PROTEIN OTHER: CPT | Performed by: NURSE PRACTITIONER

## 2021-06-23 PROCEDURE — 25010000002 ONDANSETRON PER 1 MG: Performed by: NURSE PRACTITIONER

## 2021-06-23 PROCEDURE — 25010000002 ACYCLOVIR PER 5 MG: Performed by: INTERNAL MEDICINE

## 2021-06-23 PROCEDURE — 25010000002 DEXAMETHASONE SODIUM PHOSPHATE 10 MG/ML SOLUTION: Performed by: NURSE PRACTITIONER

## 2021-06-23 PROCEDURE — U0003 INFECTIOUS AGENT DETECTION BY NUCLEIC ACID (DNA OR RNA); SEVERE ACUTE RESPIRATORY SYNDROME CORONAVIRUS 2 (SARS-COV-2) (CORONAVIRUS DISEASE [COVID-19]), AMPLIFIED PROBE TECHNIQUE, MAKING USE OF HIGH THROUGHPUT TECHNOLOGIES AS DESCRIBED BY CMS-2020-01-R: HCPCS | Performed by: NURSE PRACTITIONER

## 2021-06-23 PROCEDURE — 99221 1ST HOSP IP/OBS SF/LOW 40: CPT | Performed by: INTERNAL MEDICINE

## 2021-06-23 PROCEDURE — U0005 INFEC AGEN DETEC AMPLI PROBE: HCPCS | Performed by: NURSE PRACTITIONER

## 2021-06-23 PROCEDURE — 86694 HERPES SIMPLEX NES ANTBDY: CPT | Performed by: NURSE PRACTITIONER

## 2021-06-23 PROCEDURE — 87205 SMEAR GRAM STAIN: CPT | Performed by: NURSE PRACTITIONER

## 2021-06-23 PROCEDURE — 87483 CNS DNA AMP PROBE TYPE 12-25: CPT | Performed by: NURSE PRACTITIONER

## 2021-06-23 PROCEDURE — 87070 CULTURE OTHR SPECIMN AEROBIC: CPT | Performed by: NURSE PRACTITIONER

## 2021-06-23 PROCEDURE — 25010000002 VANCOMYCIN 10 G RECONSTITUTED SOLUTION: Performed by: EMERGENCY MEDICINE

## 2021-06-23 PROCEDURE — 25010000002 HYDROMORPHONE PER 4 MG: Performed by: NURSE PRACTITIONER

## 2021-06-23 PROCEDURE — 36415 COLL VENOUS BLD VENIPUNCTURE: CPT | Performed by: INTERNAL MEDICINE

## 2021-06-23 PROCEDURE — 87015 SPECIMEN INFECT AGNT CONCNTJ: CPT | Performed by: NURSE PRACTITIONER

## 2021-06-23 PROCEDURE — 94799 UNLISTED PULMONARY SVC/PX: CPT

## 2021-06-23 PROCEDURE — 85610 PROTHROMBIN TIME: CPT | Performed by: NURSE PRACTITIONER

## 2021-06-23 PROCEDURE — 94640 AIRWAY INHALATION TREATMENT: CPT

## 2021-06-23 PROCEDURE — 82945 GLUCOSE OTHER FLUID: CPT | Performed by: NURSE PRACTITIONER

## 2021-06-23 PROCEDURE — 80053 COMPREHEN METABOLIC PANEL: CPT | Performed by: NURSE PRACTITIONER

## 2021-06-23 PROCEDURE — 87040 BLOOD CULTURE FOR BACTERIA: CPT | Performed by: NURSE PRACTITIONER

## 2021-06-23 PROCEDURE — 009U3ZX DRAINAGE OF SPINAL CANAL, PERCUTANEOUS APPROACH, DIAGNOSTIC: ICD-10-PCS | Performed by: RADIOLOGY

## 2021-06-23 PROCEDURE — 85025 COMPLETE CBC W/AUTO DIFF WBC: CPT | Performed by: NURSE PRACTITIONER

## 2021-06-23 PROCEDURE — 25010000003 LIDOCAINE 1 % SOLUTION: Performed by: EMERGENCY MEDICINE

## 2021-06-23 PROCEDURE — 86723 LISTERIA MONOCYTOGENES: CPT | Performed by: INTERNAL MEDICINE

## 2021-06-23 PROCEDURE — 25010000003 CEFTRIAXONE PER 250 MG: Performed by: NURSE PRACTITIONER

## 2021-06-23 PROCEDURE — 70450 CT HEAD/BRAIN W/O DYE: CPT

## 2021-06-23 PROCEDURE — 99285 EMERGENCY DEPT VISIT HI MDM: CPT

## 2021-06-23 PROCEDURE — 25010000002 AMPICILLIN PER 500 MG: Performed by: EMERGENCY MEDICINE

## 2021-06-23 PROCEDURE — 83605 ASSAY OF LACTIC ACID: CPT | Performed by: NURSE PRACTITIONER

## 2021-06-23 PROCEDURE — 82962 GLUCOSE BLOOD TEST: CPT

## 2021-06-23 RX ORDER — BISACODYL 5 MG/1
5 TABLET, DELAYED RELEASE ORAL DAILY PRN
Status: DISCONTINUED | OUTPATIENT
Start: 2021-06-23 | End: 2021-06-27 | Stop reason: HOSPADM

## 2021-06-23 RX ORDER — CEFTRIAXONE SODIUM 2 G/50ML
2 INJECTION, SOLUTION INTRAVENOUS ONCE
Status: COMPLETED | OUTPATIENT
Start: 2021-06-23 | End: 2021-06-23

## 2021-06-23 RX ORDER — NALOXONE HCL 0.4 MG/ML
0.4 VIAL (ML) INJECTION
Status: DISCONTINUED | OUTPATIENT
Start: 2021-06-23 | End: 2021-06-23

## 2021-06-23 RX ORDER — ONDANSETRON 2 MG/ML
4 INJECTION INTRAMUSCULAR; INTRAVENOUS ONCE
Status: COMPLETED | OUTPATIENT
Start: 2021-06-23 | End: 2021-06-23

## 2021-06-23 RX ORDER — LIDOCAINE HYDROCHLORIDE 10 MG/ML
6 INJECTION, SOLUTION INFILTRATION; PERINEURAL ONCE
Status: COMPLETED | OUTPATIENT
Start: 2021-06-23 | End: 2021-06-23

## 2021-06-23 RX ORDER — ONDANSETRON 2 MG/ML
4 INJECTION INTRAMUSCULAR; INTRAVENOUS EVERY 6 HOURS PRN
Status: DISCONTINUED | OUTPATIENT
Start: 2021-06-23 | End: 2021-06-27 | Stop reason: HOSPADM

## 2021-06-23 RX ORDER — CEFTRIAXONE SODIUM 2 G/50ML
2 INJECTION, SOLUTION INTRAVENOUS EVERY 24 HOURS
Status: COMPLETED | OUTPATIENT
Start: 2021-06-24 | End: 2021-06-25

## 2021-06-23 RX ORDER — ONDANSETRON 4 MG/1
4 TABLET, FILM COATED ORAL EVERY 6 HOURS PRN
Status: DISCONTINUED | OUTPATIENT
Start: 2021-06-23 | End: 2021-06-27 | Stop reason: HOSPADM

## 2021-06-23 RX ORDER — DICYCLOMINE HYDROCHLORIDE 10 MG/1
10 CAPSULE ORAL 3 TIMES DAILY
Status: DISCONTINUED | OUTPATIENT
Start: 2021-06-23 | End: 2021-06-27 | Stop reason: HOSPADM

## 2021-06-23 RX ORDER — DEXTROSE MONOHYDRATE 25 G/50ML
25 INJECTION, SOLUTION INTRAVENOUS
Status: DISCONTINUED | OUTPATIENT
Start: 2021-06-23 | End: 2021-06-27 | Stop reason: HOSPADM

## 2021-06-23 RX ORDER — ACETAMINOPHEN 325 MG/1
650 TABLET ORAL EVERY 4 HOURS PRN
Status: DISCONTINUED | OUTPATIENT
Start: 2021-06-23 | End: 2021-06-27 | Stop reason: HOSPADM

## 2021-06-23 RX ORDER — NITROGLYCERIN 0.4 MG/1
0.4 TABLET SUBLINGUAL
Status: DISCONTINUED | OUTPATIENT
Start: 2021-06-23 | End: 2021-06-27 | Stop reason: HOSPADM

## 2021-06-23 RX ORDER — HYDROMORPHONE HYDROCHLORIDE 1 MG/ML
0.5 INJECTION, SOLUTION INTRAMUSCULAR; INTRAVENOUS; SUBCUTANEOUS ONCE
Status: COMPLETED | OUTPATIENT
Start: 2021-06-23 | End: 2021-06-23

## 2021-06-23 RX ORDER — HYDROCODONE BITARTRATE AND ACETAMINOPHEN 5; 325 MG/1; MG/1
1 TABLET ORAL EVERY 4 HOURS PRN
Status: DISCONTINUED | OUTPATIENT
Start: 2021-06-23 | End: 2021-06-27 | Stop reason: HOSPADM

## 2021-06-23 RX ORDER — NALOXONE HCL 0.4 MG/ML
0.4 VIAL (ML) INJECTION
Status: DISCONTINUED | OUTPATIENT
Start: 2021-06-23 | End: 2021-06-27 | Stop reason: HOSPADM

## 2021-06-23 RX ORDER — ALPRAZOLAM 0.5 MG/1
0.5 TABLET ORAL 2 TIMES DAILY PRN
COMMUNITY

## 2021-06-23 RX ORDER — SODIUM CHLORIDE 0.9 % (FLUSH) 0.9 %
10 SYRINGE (ML) INJECTION AS NEEDED
Status: DISCONTINUED | OUTPATIENT
Start: 2021-06-23 | End: 2021-06-27 | Stop reason: HOSPADM

## 2021-06-23 RX ORDER — GABAPENTIN 300 MG/1
300 CAPSULE ORAL 3 TIMES DAILY
Status: DISCONTINUED | OUTPATIENT
Start: 2021-06-23 | End: 2021-06-27 | Stop reason: HOSPADM

## 2021-06-23 RX ORDER — BUDESONIDE AND FORMOTEROL FUMARATE DIHYDRATE 160; 4.5 UG/1; UG/1
2 AEROSOL RESPIRATORY (INHALATION) 2 TIMES DAILY
Status: DISCONTINUED | OUTPATIENT
Start: 2021-06-23 | End: 2021-06-27 | Stop reason: HOSPADM

## 2021-06-23 RX ORDER — BISACODYL 10 MG
10 SUPPOSITORY, RECTAL RECTAL DAILY PRN
Status: DISCONTINUED | OUTPATIENT
Start: 2021-06-23 | End: 2021-06-27 | Stop reason: HOSPADM

## 2021-06-23 RX ORDER — MORPHINE SULFATE 2 MG/ML
1 INJECTION, SOLUTION INTRAMUSCULAR; INTRAVENOUS EVERY 4 HOURS PRN
Status: DISCONTINUED | OUTPATIENT
Start: 2021-06-23 | End: 2021-06-27 | Stop reason: HOSPADM

## 2021-06-23 RX ORDER — MORPHINE SULFATE 2 MG/ML
1 INJECTION, SOLUTION INTRAMUSCULAR; INTRAVENOUS EVERY 4 HOURS PRN
Status: DISCONTINUED | OUTPATIENT
Start: 2021-06-23 | End: 2021-06-23

## 2021-06-23 RX ORDER — ROFLUMILAST 500 UG/1
500 TABLET ORAL DAILY
Status: DISCONTINUED | OUTPATIENT
Start: 2021-06-23 | End: 2021-06-27 | Stop reason: HOSPADM

## 2021-06-23 RX ORDER — DEXAMETHASONE SODIUM PHOSPHATE 10 MG/ML
10 INJECTION, SOLUTION INTRAMUSCULAR; INTRAVENOUS ONCE
Status: COMPLETED | OUTPATIENT
Start: 2021-06-23 | End: 2021-06-23

## 2021-06-23 RX ORDER — IPRATROPIUM BROMIDE AND ALBUTEROL SULFATE 2.5; .5 MG/3ML; MG/3ML
3 SOLUTION RESPIRATORY (INHALATION) EVERY 6 HOURS PRN
Status: DISCONTINUED | OUTPATIENT
Start: 2021-06-23 | End: 2021-06-27 | Stop reason: HOSPADM

## 2021-06-23 RX ORDER — POLYETHYLENE GLYCOL 3350 17 G/17G
17 POWDER, FOR SOLUTION ORAL DAILY PRN
Status: DISCONTINUED | OUTPATIENT
Start: 2021-06-23 | End: 2021-06-27 | Stop reason: HOSPADM

## 2021-06-23 RX ORDER — INSULIN LISPRO 100 [IU]/ML
0-7 INJECTION, SOLUTION INTRAVENOUS; SUBCUTANEOUS
Status: DISCONTINUED | OUTPATIENT
Start: 2021-06-23 | End: 2021-06-27 | Stop reason: HOSPADM

## 2021-06-23 RX ORDER — ALPRAZOLAM 0.25 MG/1
0.5 TABLET ORAL 2 TIMES DAILY PRN
Status: DISCONTINUED | OUTPATIENT
Start: 2021-06-23 | End: 2021-06-27 | Stop reason: HOSPADM

## 2021-06-23 RX ORDER — AMOXICILLIN 250 MG
2 CAPSULE ORAL 2 TIMES DAILY
Status: DISCONTINUED | OUTPATIENT
Start: 2021-06-23 | End: 2021-06-27 | Stop reason: HOSPADM

## 2021-06-23 RX ORDER — CYCLOBENZAPRINE HCL 10 MG
10 TABLET ORAL 2 TIMES DAILY PRN
Status: DISCONTINUED | OUTPATIENT
Start: 2021-06-23 | End: 2021-06-25

## 2021-06-23 RX ORDER — SODIUM CHLORIDE 0.9 % (FLUSH) 0.9 %
10 SYRINGE (ML) INJECTION EVERY 12 HOURS SCHEDULED
Status: DISCONTINUED | OUTPATIENT
Start: 2021-06-23 | End: 2021-06-27 | Stop reason: HOSPADM

## 2021-06-23 RX ORDER — NICOTINE POLACRILEX 4 MG
15 LOZENGE BUCCAL
Status: DISCONTINUED | OUTPATIENT
Start: 2021-06-23 | End: 2021-06-27 | Stop reason: HOSPADM

## 2021-06-23 RX ADMIN — ACYCLOVIR SODIUM 590 MG: 50 INJECTION, SOLUTION INTRAVENOUS at 09:07

## 2021-06-23 RX ADMIN — HYDROCODONE BITARTRATE AND ACETAMINOPHEN 1 TABLET: 5; 325 TABLET ORAL at 21:14

## 2021-06-23 RX ADMIN — SODIUM CHLORIDE 1000 ML: 9 INJECTION, SOLUTION INTRAVENOUS at 06:41

## 2021-06-23 RX ADMIN — HYDROMORPHONE HYDROCHLORIDE 0.5 MG: 1 INJECTION, SOLUTION INTRAMUSCULAR; INTRAVENOUS; SUBCUTANEOUS at 06:46

## 2021-06-23 RX ADMIN — VANCOMYCIN HYDROCHLORIDE 1750 MG: 10 INJECTION, POWDER, LYOPHILIZED, FOR SOLUTION INTRAVENOUS at 11:31

## 2021-06-23 RX ADMIN — ONDANSETRON 4 MG: 2 INJECTION INTRAMUSCULAR; INTRAVENOUS at 08:59

## 2021-06-23 RX ADMIN — ONDANSETRON 4 MG: 2 INJECTION INTRAMUSCULAR; INTRAVENOUS at 15:16

## 2021-06-23 RX ADMIN — SODIUM CHLORIDE, PRESERVATIVE FREE 10 ML: 5 INJECTION INTRAVENOUS at 21:15

## 2021-06-23 RX ADMIN — CEFTRIAXONE SODIUM 2 G: 2 INJECTION, SOLUTION INTRAVENOUS at 08:31

## 2021-06-23 RX ADMIN — GABAPENTIN 300 MG: 300 CAPSULE ORAL at 21:14

## 2021-06-23 RX ADMIN — ACYCLOVIR SODIUM 590 MG: 50 INJECTION, SOLUTION INTRAVENOUS at 23:00

## 2021-06-23 RX ADMIN — DEXAMETHASONE SODIUM PHOSPHATE 10 MG: 10 INJECTION, SOLUTION INTRAMUSCULAR; INTRAVENOUS at 09:00

## 2021-06-23 RX ADMIN — ALPRAZOLAM 0.5 MG: 0.25 TABLET ORAL at 21:14

## 2021-06-23 RX ADMIN — LIDOCAINE HYDROCHLORIDE 6 ML: 10 INJECTION, SOLUTION INFILTRATION; PERINEURAL at 08:00

## 2021-06-23 RX ADMIN — HYDROMORPHONE HYDROCHLORIDE 0.5 MG: 1 INJECTION, SOLUTION INTRAMUSCULAR; INTRAVENOUS; SUBCUTANEOUS at 15:17

## 2021-06-23 RX ADMIN — ONDANSETRON 4 MG: 2 INJECTION INTRAMUSCULAR; INTRAVENOUS at 06:46

## 2021-06-23 RX ADMIN — HYDROMORPHONE HYDROCHLORIDE 0.5 MG: 1 INJECTION, SOLUTION INTRAMUSCULAR; INTRAVENOUS; SUBCUTANEOUS at 09:00

## 2021-06-23 RX ADMIN — AMPICILLIN 2 G: 2 INJECTION, POWDER, FOR SOLUTION INTRAMUSCULAR; INTRAVENOUS at 11:00

## 2021-06-23 RX ADMIN — BUDESONIDE AND FORMOTEROL FUMARATE DIHYDRATE 2 PUFF: 160; 4.5 AEROSOL RESPIRATORY (INHALATION) at 23:24

## 2021-06-24 LAB
ALBUMIN SERPL-MCNC: 3.9 G/DL (ref 3.5–5.2)
ALBUMIN/GLOB SERPL: 1.3 G/DL
ALP SERPL-CCNC: 86 U/L (ref 39–117)
ALT SERPL W P-5'-P-CCNC: 18 U/L (ref 1–33)
ANION GAP SERPL CALCULATED.3IONS-SCNC: 12.7 MMOL/L (ref 5–15)
AST SERPL-CCNC: 20 U/L (ref 1–32)
BACTERIA UR QL AUTO: ABNORMAL /HPF
BILIRUB SERPL-MCNC: 0.3 MG/DL (ref 0–1.2)
BILIRUB UR QL STRIP: NEGATIVE
BUN SERPL-MCNC: 9 MG/DL (ref 8–23)
BUN/CREAT SERPL: 11.7 (ref 7–25)
CALCIUM SPEC-SCNC: 8.3 MG/DL (ref 8.6–10.5)
CHLORIDE SERPL-SCNC: 100 MMOL/L (ref 98–107)
CLARITY UR: CLEAR
CO2 SERPL-SCNC: 25.3 MMOL/L (ref 22–29)
COLOR UR: YELLOW
CREAT SERPL-MCNC: 0.77 MG/DL (ref 0.57–1)
DEPRECATED RDW RBC AUTO: 40.5 FL (ref 37–54)
ERYTHROCYTE [DISTWIDTH] IN BLOOD BY AUTOMATED COUNT: 12.4 % (ref 12.3–15.4)
GFR SERPL CREATININE-BSD FRML MDRD: 91 ML/MIN/1.73
GLOBULIN UR ELPH-MCNC: 3 GM/DL
GLUCOSE BLDC GLUCOMTR-MCNC: 110 MG/DL (ref 70–130)
GLUCOSE BLDC GLUCOMTR-MCNC: 114 MG/DL (ref 70–130)
GLUCOSE BLDC GLUCOMTR-MCNC: 114 MG/DL (ref 70–130)
GLUCOSE BLDC GLUCOMTR-MCNC: 128 MG/DL (ref 70–130)
GLUCOSE SERPL-MCNC: 103 MG/DL (ref 65–99)
GLUCOSE UR STRIP-MCNC: NEGATIVE MG/DL
HBA1C MFR BLD: 5.95 % (ref 4.8–5.6)
HCT VFR BLD AUTO: 38.2 % (ref 34–46.6)
HGB BLD-MCNC: 13.1 G/DL (ref 12–15.9)
HGB UR QL STRIP.AUTO: NEGATIVE
HIV1+2 AB SER QL: NORMAL
HYALINE CASTS UR QL AUTO: ABNORMAL /LPF
KETONES UR QL STRIP: NEGATIVE
LEUKOCYTE ESTERASE UR QL STRIP.AUTO: ABNORMAL
MCH RBC QN AUTO: 30.5 PG (ref 26.6–33)
MCHC RBC AUTO-ENTMCNC: 34.3 G/DL (ref 31.5–35.7)
MCV RBC AUTO: 88.8 FL (ref 79–97)
MRSA DNA SPEC QL NAA+PROBE: NORMAL
NITRITE UR QL STRIP: NEGATIVE
PH UR STRIP.AUTO: 6 [PH] (ref 5–8)
PLATELET # BLD AUTO: 225 10*3/MM3 (ref 140–450)
PMV BLD AUTO: 9.9 FL (ref 6–12)
POTASSIUM SERPL-SCNC: 3.8 MMOL/L (ref 3.5–5.2)
PROT SERPL-MCNC: 6.9 G/DL (ref 6–8.5)
PROT UR QL STRIP: NEGATIVE
RBC # BLD AUTO: 4.3 10*6/MM3 (ref 3.77–5.28)
RBC # UR: ABNORMAL /HPF
REF LAB TEST METHOD: ABNORMAL
SODIUM SERPL-SCNC: 138 MMOL/L (ref 136–145)
SP GR UR STRIP: 1.01 (ref 1–1.03)
SQUAMOUS #/AREA URNS HPF: ABNORMAL /HPF
UROBILINOGEN UR QL STRIP: ABNORMAL
WBC # BLD AUTO: 6.26 10*3/MM3 (ref 3.4–10.8)
WBC UR QL AUTO: ABNORMAL /HPF

## 2021-06-24 PROCEDURE — 94799 UNLISTED PULMONARY SVC/PX: CPT

## 2021-06-24 PROCEDURE — 81001 URINALYSIS AUTO W/SCOPE: CPT | Performed by: INTERNAL MEDICINE

## 2021-06-24 PROCEDURE — 85027 COMPLETE CBC AUTOMATED: CPT | Performed by: INTERNAL MEDICINE

## 2021-06-24 PROCEDURE — G0432 EIA HIV-1/HIV-2 SCREEN: HCPCS | Performed by: INTERNAL MEDICINE

## 2021-06-24 PROCEDURE — 99232 SBSQ HOSP IP/OBS MODERATE 35: CPT | Performed by: INTERNAL MEDICINE

## 2021-06-24 PROCEDURE — 25010000002 ACYCLOVIR PER 5 MG: Performed by: INTERNAL MEDICINE

## 2021-06-24 PROCEDURE — 83036 HEMOGLOBIN GLYCOSYLATED A1C: CPT | Performed by: NURSE PRACTITIONER

## 2021-06-24 PROCEDURE — 25010000003 CEFTRIAXONE PER 250 MG: Performed by: INTERNAL MEDICINE

## 2021-06-24 PROCEDURE — 87641 MR-STAPH DNA AMP PROBE: CPT | Performed by: INTERNAL MEDICINE

## 2021-06-24 PROCEDURE — 80053 COMPREHEN METABOLIC PANEL: CPT | Performed by: INTERNAL MEDICINE

## 2021-06-24 PROCEDURE — 82962 GLUCOSE BLOOD TEST: CPT

## 2021-06-24 RX ADMIN — SODIUM CHLORIDE, PRESERVATIVE FREE 10 ML: 5 INJECTION INTRAVENOUS at 20:51

## 2021-06-24 RX ADMIN — ACETAMINOPHEN 650 MG: 325 TABLET, FILM COATED ORAL at 09:38

## 2021-06-24 RX ADMIN — DICYCLOMINE HYDROCHLORIDE 10 MG: 10 CAPSULE ORAL at 15:21

## 2021-06-24 RX ADMIN — GABAPENTIN 300 MG: 300 CAPSULE ORAL at 09:38

## 2021-06-24 RX ADMIN — ACYCLOVIR SODIUM 590 MG: 50 INJECTION, SOLUTION INTRAVENOUS at 05:59

## 2021-06-24 RX ADMIN — ALPRAZOLAM 0.5 MG: 0.25 TABLET ORAL at 20:47

## 2021-06-24 RX ADMIN — SODIUM CHLORIDE, PRESERVATIVE FREE 10 ML: 5 INJECTION INTRAVENOUS at 09:39

## 2021-06-24 RX ADMIN — CEFTRIAXONE SODIUM 2 G: 2 INJECTION, SOLUTION INTRAVENOUS at 09:38

## 2021-06-24 RX ADMIN — BUDESONIDE AND FORMOTEROL FUMARATE DIHYDRATE 2 PUFF: 160; 4.5 AEROSOL RESPIRATORY (INHALATION) at 20:00

## 2021-06-24 RX ADMIN — GABAPENTIN 300 MG: 300 CAPSULE ORAL at 17:09

## 2021-06-24 RX ADMIN — ALPRAZOLAM 0.5 MG: 0.25 TABLET ORAL at 06:00

## 2021-06-24 RX ADMIN — ACYCLOVIR SODIUM 590 MG: 50 INJECTION, SOLUTION INTRAVENOUS at 20:51

## 2021-06-24 RX ADMIN — HYDROCODONE BITARTRATE AND ACETAMINOPHEN 1 TABLET: 5; 325 TABLET ORAL at 05:59

## 2021-06-24 RX ADMIN — DICYCLOMINE HYDROCHLORIDE 10 MG: 10 CAPSULE ORAL at 09:38

## 2021-06-24 RX ADMIN — HYDROCODONE BITARTRATE AND ACETAMINOPHEN 1 TABLET: 5; 325 TABLET ORAL at 15:21

## 2021-06-24 RX ADMIN — HYDROCODONE BITARTRATE AND ACETAMINOPHEN 1 TABLET: 5; 325 TABLET ORAL at 20:47

## 2021-06-24 RX ADMIN — GABAPENTIN 300 MG: 300 CAPSULE ORAL at 20:47

## 2021-06-24 RX ADMIN — BUDESONIDE AND FORMOTEROL FUMARATE DIHYDRATE 2 PUFF: 160; 4.5 AEROSOL RESPIRATORY (INHALATION) at 07:49

## 2021-06-24 RX ADMIN — ROFLUMILAST 500 MCG: 500 TABLET ORAL at 09:38

## 2021-06-24 RX ADMIN — SERTRALINE HYDROCHLORIDE 50 MG: 50 TABLET, FILM COATED ORAL at 09:38

## 2021-06-24 RX ADMIN — ACYCLOVIR SODIUM 590 MG: 50 INJECTION, SOLUTION INTRAVENOUS at 13:25

## 2021-06-25 ENCOUNTER — APPOINTMENT (OUTPATIENT)
Dept: MRI IMAGING | Facility: HOSPITAL | Age: 66
End: 2021-06-25

## 2021-06-25 LAB
ANION GAP SERPL CALCULATED.3IONS-SCNC: 6.7 MMOL/L (ref 5–15)
BACTERIA UR QL AUTO: ABNORMAL /HPF
BILIRUB UR QL STRIP: NEGATIVE
BUN SERPL-MCNC: 11 MG/DL (ref 8–23)
BUN/CREAT SERPL: 14.7 (ref 7–25)
CALCIUM SPEC-SCNC: 8.1 MG/DL (ref 8.6–10.5)
CHLORIDE SERPL-SCNC: 101 MMOL/L (ref 98–107)
CLARITY UR: CLEAR
CO2 SERPL-SCNC: 27.3 MMOL/L (ref 22–29)
COLOR UR: YELLOW
CREAT SERPL-MCNC: 0.75 MG/DL (ref 0.57–1)
GFR SERPL CREATININE-BSD FRML MDRD: 94 ML/MIN/1.73
GLUCOSE BLDC GLUCOMTR-MCNC: 103 MG/DL (ref 70–130)
GLUCOSE BLDC GLUCOMTR-MCNC: 113 MG/DL (ref 70–130)
GLUCOSE BLDC GLUCOMTR-MCNC: 119 MG/DL (ref 70–130)
GLUCOSE BLDC GLUCOMTR-MCNC: 98 MG/DL (ref 70–130)
GLUCOSE SERPL-MCNC: 86 MG/DL (ref 65–99)
GLUCOSE UR STRIP-MCNC: NEGATIVE MG/DL
HGB UR QL STRIP.AUTO: NEGATIVE
HYALINE CASTS UR QL AUTO: ABNORMAL /LPF
KETONES UR QL STRIP: NEGATIVE
LEUKOCYTE ESTERASE UR QL STRIP.AUTO: ABNORMAL
NITRITE UR QL STRIP: NEGATIVE
PH UR STRIP.AUTO: 6 [PH] (ref 5–8)
POTASSIUM SERPL-SCNC: 3.8 MMOL/L (ref 3.5–5.2)
PROT UR QL STRIP: NEGATIVE
RBC # UR: ABNORMAL /HPF
REF LAB TEST METHOD: ABNORMAL
SODIUM SERPL-SCNC: 135 MMOL/L (ref 136–145)
SP GR UR STRIP: <=1.005 (ref 1–1.03)
SQUAMOUS #/AREA URNS HPF: ABNORMAL /HPF
UROBILINOGEN UR QL STRIP: ABNORMAL
WBC UR QL AUTO: ABNORMAL /HPF

## 2021-06-25 PROCEDURE — 25010000002 ACYCLOVIR PER 5 MG: Performed by: INTERNAL MEDICINE

## 2021-06-25 PROCEDURE — 99232 SBSQ HOSP IP/OBS MODERATE 35: CPT | Performed by: INTERNAL MEDICINE

## 2021-06-25 PROCEDURE — 0 GADOBENATE DIMEGLUMINE 529 MG/ML SOLUTION: Performed by: INTERNAL MEDICINE

## 2021-06-25 PROCEDURE — 70553 MRI BRAIN STEM W/O & W/DYE: CPT

## 2021-06-25 PROCEDURE — 94760 N-INVAS EAR/PLS OXIMETRY 1: CPT

## 2021-06-25 PROCEDURE — 81001 URINALYSIS AUTO W/SCOPE: CPT | Performed by: INTERNAL MEDICINE

## 2021-06-25 PROCEDURE — 94799 UNLISTED PULMONARY SVC/PX: CPT

## 2021-06-25 PROCEDURE — 25010000003 CEFTRIAXONE PER 250 MG: Performed by: INTERNAL MEDICINE

## 2021-06-25 PROCEDURE — 82962 GLUCOSE BLOOD TEST: CPT

## 2021-06-25 PROCEDURE — A9577 INJ MULTIHANCE: HCPCS | Performed by: INTERNAL MEDICINE

## 2021-06-25 PROCEDURE — 80048 BASIC METABOLIC PNL TOTAL CA: CPT | Performed by: NURSE PRACTITIONER

## 2021-06-25 RX ORDER — CYCLOBENZAPRINE HCL 10 MG
10 TABLET ORAL 3 TIMES DAILY PRN
Status: DISCONTINUED | OUTPATIENT
Start: 2021-06-25 | End: 2021-06-27 | Stop reason: HOSPADM

## 2021-06-25 RX ADMIN — BUDESONIDE AND FORMOTEROL FUMARATE DIHYDRATE 2 PUFF: 160; 4.5 AEROSOL RESPIRATORY (INHALATION) at 20:08

## 2021-06-25 RX ADMIN — HYDROCODONE BITARTRATE AND ACETAMINOPHEN 1 TABLET: 5; 325 TABLET ORAL at 00:18

## 2021-06-25 RX ADMIN — ACYCLOVIR SODIUM 590 MG: 50 INJECTION, SOLUTION INTRAVENOUS at 21:15

## 2021-06-25 RX ADMIN — ACYCLOVIR SODIUM 590 MG: 50 INJECTION, SOLUTION INTRAVENOUS at 14:13

## 2021-06-25 RX ADMIN — SODIUM CHLORIDE, PRESERVATIVE FREE 10 ML: 5 INJECTION INTRAVENOUS at 21:16

## 2021-06-25 RX ADMIN — GABAPENTIN 300 MG: 300 CAPSULE ORAL at 17:21

## 2021-06-25 RX ADMIN — DOCUSATE SODIUM 50MG AND SENNOSIDES 8.6MG 2 TABLET: 8.6; 5 TABLET, FILM COATED ORAL at 21:15

## 2021-06-25 RX ADMIN — GABAPENTIN 300 MG: 300 CAPSULE ORAL at 09:06

## 2021-06-25 RX ADMIN — HYDROCODONE BITARTRATE AND ACETAMINOPHEN 1 TABLET: 5; 325 TABLET ORAL at 06:22

## 2021-06-25 RX ADMIN — DICYCLOMINE HYDROCHLORIDE 10 MG: 10 CAPSULE ORAL at 17:21

## 2021-06-25 RX ADMIN — DICYCLOMINE HYDROCHLORIDE 10 MG: 10 CAPSULE ORAL at 21:15

## 2021-06-25 RX ADMIN — CYCLOBENZAPRINE 10 MG: 10 TABLET, FILM COATED ORAL at 14:23

## 2021-06-25 RX ADMIN — SERTRALINE HYDROCHLORIDE 50 MG: 50 TABLET, FILM COATED ORAL at 09:06

## 2021-06-25 RX ADMIN — POLYETHYLENE GLYCOL 3350 17 G: 17 POWDER, FOR SOLUTION ORAL at 09:35

## 2021-06-25 RX ADMIN — SODIUM CHLORIDE, PRESERVATIVE FREE 10 ML: 5 INJECTION INTRAVENOUS at 14:14

## 2021-06-25 RX ADMIN — DICYCLOMINE HYDROCHLORIDE 10 MG: 10 CAPSULE ORAL at 09:07

## 2021-06-25 RX ADMIN — ACYCLOVIR SODIUM 590 MG: 50 INJECTION, SOLUTION INTRAVENOUS at 06:13

## 2021-06-25 RX ADMIN — ROFLUMILAST 500 MCG: 500 TABLET ORAL at 09:06

## 2021-06-25 RX ADMIN — HYDROCODONE BITARTRATE AND ACETAMINOPHEN 1 TABLET: 5; 325 TABLET ORAL at 14:23

## 2021-06-25 RX ADMIN — ACETAMINOPHEN 650 MG: 325 TABLET, FILM COATED ORAL at 09:06

## 2021-06-25 RX ADMIN — GADOBENATE DIMEGLUMINE 17 ML: 529 INJECTION, SOLUTION INTRAVENOUS at 12:06

## 2021-06-25 RX ADMIN — BUDESONIDE AND FORMOTEROL FUMARATE DIHYDRATE 2 PUFF: 160; 4.5 AEROSOL RESPIRATORY (INHALATION) at 08:22

## 2021-06-25 RX ADMIN — ALPRAZOLAM 0.5 MG: 0.25 TABLET ORAL at 09:06

## 2021-06-25 RX ADMIN — CEFTRIAXONE SODIUM 2 G: 2 INJECTION, SOLUTION INTRAVENOUS at 00:18

## 2021-06-25 RX ADMIN — GABAPENTIN 300 MG: 300 CAPSULE ORAL at 21:16

## 2021-06-26 LAB
ALBUMIN SERPL-MCNC: 3.7 G/DL (ref 3.5–5.2)
ALBUMIN/GLOB SERPL: 1.4 G/DL
ALP SERPL-CCNC: 72 U/L (ref 39–117)
ALT SERPL W P-5'-P-CCNC: 29 U/L (ref 1–33)
ANION GAP SERPL CALCULATED.3IONS-SCNC: 5.9 MMOL/L (ref 5–15)
AST SERPL-CCNC: 23 U/L (ref 1–32)
BACTERIA SPEC AEROBE CULT: NORMAL
BILIRUB SERPL-MCNC: 0.3 MG/DL (ref 0–1.2)
BUN SERPL-MCNC: 10 MG/DL (ref 8–23)
BUN/CREAT SERPL: 13.9 (ref 7–25)
CALCIUM SPEC-SCNC: 8.5 MG/DL (ref 8.6–10.5)
CHLORIDE SERPL-SCNC: 101 MMOL/L (ref 98–107)
CO2 SERPL-SCNC: 29.1 MMOL/L (ref 22–29)
CREAT SERPL-MCNC: 0.72 MG/DL (ref 0.57–1)
DEPRECATED RDW RBC AUTO: 42.7 FL (ref 37–54)
EOSINOPHIL # BLD MANUAL: 0.34 10*3/MM3 (ref 0–0.4)
EOSINOPHIL NFR BLD MANUAL: 6.1 % (ref 0.3–6.2)
ERYTHROCYTE [DISTWIDTH] IN BLOOD BY AUTOMATED COUNT: 12.8 % (ref 12.3–15.4)
GFR SERPL CREATININE-BSD FRML MDRD: 99 ML/MIN/1.73
GLOBULIN UR ELPH-MCNC: 2.7 GM/DL
GLUCOSE BLDC GLUCOMTR-MCNC: 102 MG/DL (ref 70–130)
GLUCOSE BLDC GLUCOMTR-MCNC: 105 MG/DL (ref 70–130)
GLUCOSE BLDC GLUCOMTR-MCNC: 125 MG/DL (ref 70–130)
GLUCOSE BLDC GLUCOMTR-MCNC: 140 MG/DL (ref 70–130)
GLUCOSE SERPL-MCNC: 96 MG/DL (ref 65–99)
GRAM STN SPEC: NORMAL
GRAM STN SPEC: NORMAL
HCT VFR BLD AUTO: 36.1 % (ref 34–46.6)
HGB BLD-MCNC: 12.1 G/DL (ref 12–15.9)
HSV IGG SER-ACNC: 0.64 IV
LYMPHOCYTES # BLD MANUAL: 1.3 10*3/MM3 (ref 0.7–3.1)
LYMPHOCYTES NFR BLD MANUAL: 10.1 % (ref 5–12)
LYMPHOCYTES NFR BLD MANUAL: 23.2 % (ref 19.6–45.3)
MCH RBC QN AUTO: 30.5 PG (ref 26.6–33)
MCHC RBC AUTO-ENTMCNC: 33.5 G/DL (ref 31.5–35.7)
MCV RBC AUTO: 90.9 FL (ref 79–97)
MONOCYTES # BLD AUTO: 0.57 10*3/MM3 (ref 0.1–0.9)
NEUTROPHILS # BLD AUTO: 3.39 10*3/MM3 (ref 1.7–7)
NEUTROPHILS NFR BLD MANUAL: 60.6 % (ref 42.7–76)
NRBC BLD AUTO-RTO: 0 /100 WBC (ref 0–0.2)
PLAT MORPH BLD: NORMAL
PLATELET # BLD AUTO: 233 10*3/MM3 (ref 140–450)
PMV BLD AUTO: 9.9 FL (ref 6–12)
POTASSIUM SERPL-SCNC: 4 MMOL/L (ref 3.5–5.2)
PROT SERPL-MCNC: 6.4 G/DL (ref 6–8.5)
RBC # BLD AUTO: 3.97 10*6/MM3 (ref 3.77–5.28)
RBC MORPH BLD: NORMAL
SMUDGE CELLS BLD QL SMEAR: NORMAL
SODIUM SERPL-SCNC: 136 MMOL/L (ref 136–145)
WBC # BLD AUTO: 5.6 10*3/MM3 (ref 3.4–10.8)

## 2021-06-26 PROCEDURE — 94799 UNLISTED PULMONARY SVC/PX: CPT

## 2021-06-26 PROCEDURE — 82962 GLUCOSE BLOOD TEST: CPT

## 2021-06-26 PROCEDURE — 85025 COMPLETE CBC W/AUTO DIFF WBC: CPT | Performed by: INTERNAL MEDICINE

## 2021-06-26 PROCEDURE — 25010000002 ACYCLOVIR PER 5 MG: Performed by: INTERNAL MEDICINE

## 2021-06-26 PROCEDURE — 99233 SBSQ HOSP IP/OBS HIGH 50: CPT | Performed by: INTERNAL MEDICINE

## 2021-06-26 PROCEDURE — 80053 COMPREHEN METABOLIC PANEL: CPT | Performed by: INTERNAL MEDICINE

## 2021-06-26 PROCEDURE — 85007 BL SMEAR W/DIFF WBC COUNT: CPT | Performed by: INTERNAL MEDICINE

## 2021-06-26 RX ORDER — VALACYCLOVIR HYDROCHLORIDE 500 MG/1
1000 TABLET, FILM COATED ORAL EVERY 8 HOURS SCHEDULED
Status: DISCONTINUED | OUTPATIENT
Start: 2021-06-27 | End: 2021-06-27 | Stop reason: HOSPADM

## 2021-06-26 RX ADMIN — ROFLUMILAST 500 MCG: 500 TABLET ORAL at 08:12

## 2021-06-26 RX ADMIN — GABAPENTIN 300 MG: 300 CAPSULE ORAL at 16:56

## 2021-06-26 RX ADMIN — HYDROCODONE BITARTRATE AND ACETAMINOPHEN 1 TABLET: 5; 325 TABLET ORAL at 00:03

## 2021-06-26 RX ADMIN — HYDROCODONE BITARTRATE AND ACETAMINOPHEN 1 TABLET: 5; 325 TABLET ORAL at 16:56

## 2021-06-26 RX ADMIN — DICYCLOMINE HYDROCHLORIDE 10 MG: 10 CAPSULE ORAL at 16:56

## 2021-06-26 RX ADMIN — BUDESONIDE AND FORMOTEROL FUMARATE DIHYDRATE 2 PUFF: 160; 4.5 AEROSOL RESPIRATORY (INHALATION) at 07:44

## 2021-06-26 RX ADMIN — DICYCLOMINE HYDROCHLORIDE 10 MG: 10 CAPSULE ORAL at 21:19

## 2021-06-26 RX ADMIN — HYDROCODONE BITARTRATE AND ACETAMINOPHEN 1 TABLET: 5; 325 TABLET ORAL at 08:12

## 2021-06-26 RX ADMIN — CYCLOBENZAPRINE 10 MG: 10 TABLET, FILM COATED ORAL at 21:19

## 2021-06-26 RX ADMIN — ACYCLOVIR SODIUM 590 MG: 50 INJECTION, SOLUTION INTRAVENOUS at 03:12

## 2021-06-26 RX ADMIN — BUDESONIDE AND FORMOTEROL FUMARATE DIHYDRATE 2 PUFF: 160; 4.5 AEROSOL RESPIRATORY (INHALATION) at 20:04

## 2021-06-26 RX ADMIN — DICYCLOMINE HYDROCHLORIDE 10 MG: 10 CAPSULE ORAL at 08:12

## 2021-06-26 RX ADMIN — GABAPENTIN 300 MG: 300 CAPSULE ORAL at 08:12

## 2021-06-26 RX ADMIN — SODIUM CHLORIDE, PRESERVATIVE FREE 10 ML: 5 INJECTION INTRAVENOUS at 21:20

## 2021-06-26 RX ADMIN — CYCLOBENZAPRINE 10 MG: 10 TABLET, FILM COATED ORAL at 12:49

## 2021-06-26 RX ADMIN — ACYCLOVIR SODIUM 590 MG: 50 INJECTION, SOLUTION INTRAVENOUS at 18:33

## 2021-06-26 RX ADMIN — HYDROCODONE BITARTRATE AND ACETAMINOPHEN 1 TABLET: 5; 325 TABLET ORAL at 12:49

## 2021-06-26 RX ADMIN — ACYCLOVIR SODIUM 590 MG: 50 INJECTION, SOLUTION INTRAVENOUS at 12:49

## 2021-06-26 RX ADMIN — DOCUSATE SODIUM 50MG AND SENNOSIDES 8.6MG 2 TABLET: 8.6; 5 TABLET, FILM COATED ORAL at 08:12

## 2021-06-26 RX ADMIN — SERTRALINE HYDROCHLORIDE 50 MG: 50 TABLET, FILM COATED ORAL at 08:12

## 2021-06-26 RX ADMIN — GABAPENTIN 300 MG: 300 CAPSULE ORAL at 21:19

## 2021-06-26 RX ADMIN — SODIUM CHLORIDE, PRESERVATIVE FREE 10 ML: 5 INJECTION INTRAVENOUS at 10:16

## 2021-06-27 VITALS
HEART RATE: 96 BPM | OXYGEN SATURATION: 97 % | DIASTOLIC BLOOD PRESSURE: 88 MMHG | RESPIRATION RATE: 16 BRPM | BODY MASS INDEX: 30.44 KG/M2 | TEMPERATURE: 98 F | WEIGHT: 189.4 LBS | HEIGHT: 66 IN | SYSTOLIC BLOOD PRESSURE: 148 MMHG

## 2021-06-27 LAB
ANION GAP SERPL CALCULATED.3IONS-SCNC: 7.4 MMOL/L (ref 5–15)
BASOPHILS # BLD AUTO: 0.06 10*3/MM3 (ref 0–0.2)
BASOPHILS NFR BLD AUTO: 0.9 % (ref 0–1.5)
BUN SERPL-MCNC: 12 MG/DL (ref 8–23)
BUN/CREAT SERPL: 16 (ref 7–25)
CALCIUM SPEC-SCNC: 8.5 MG/DL (ref 8.6–10.5)
CHLORIDE SERPL-SCNC: 103 MMOL/L (ref 98–107)
CO2 SERPL-SCNC: 27.6 MMOL/L (ref 22–29)
CREAT SERPL-MCNC: 0.75 MG/DL (ref 0.57–1)
DEPRECATED RDW RBC AUTO: 43 FL (ref 37–54)
EOSINOPHIL # BLD AUTO: 0.27 10*3/MM3 (ref 0–0.4)
EOSINOPHIL NFR BLD AUTO: 4.2 % (ref 0.3–6.2)
ERYTHROCYTE [DISTWIDTH] IN BLOOD BY AUTOMATED COUNT: 13.1 % (ref 12.3–15.4)
GFR SERPL CREATININE-BSD FRML MDRD: 94 ML/MIN/1.73
GLUCOSE BLDC GLUCOMTR-MCNC: 110 MG/DL (ref 70–130)
GLUCOSE BLDC GLUCOMTR-MCNC: 113 MG/DL (ref 70–130)
GLUCOSE SERPL-MCNC: 101 MG/DL (ref 65–99)
HCT VFR BLD AUTO: 37.3 % (ref 34–46.6)
HGB BLD-MCNC: 12.3 G/DL (ref 12–15.9)
IMM GRANULOCYTES # BLD AUTO: 0.02 10*3/MM3 (ref 0–0.05)
IMM GRANULOCYTES NFR BLD AUTO: 0.3 % (ref 0–0.5)
LYMPHOCYTES # BLD AUTO: 1.77 10*3/MM3 (ref 0.7–3.1)
LYMPHOCYTES NFR BLD AUTO: 27.6 % (ref 19.6–45.3)
MCH RBC QN AUTO: 30 PG (ref 26.6–33)
MCHC RBC AUTO-ENTMCNC: 33 G/DL (ref 31.5–35.7)
MCV RBC AUTO: 91 FL (ref 79–97)
MONOCYTES # BLD AUTO: 0.55 10*3/MM3 (ref 0.1–0.9)
MONOCYTES NFR BLD AUTO: 8.6 % (ref 5–12)
NEUTROPHILS NFR BLD AUTO: 3.74 10*3/MM3 (ref 1.7–7)
NEUTROPHILS NFR BLD AUTO: 58.4 % (ref 42.7–76)
NRBC BLD AUTO-RTO: 0.2 /100 WBC (ref 0–0.2)
PLATELET # BLD AUTO: 263 10*3/MM3 (ref 140–450)
PMV BLD AUTO: 9.5 FL (ref 6–12)
POTASSIUM SERPL-SCNC: 4.2 MMOL/L (ref 3.5–5.2)
RBC # BLD AUTO: 4.1 10*6/MM3 (ref 3.77–5.28)
SODIUM SERPL-SCNC: 138 MMOL/L (ref 136–145)
WBC # BLD AUTO: 6.41 10*3/MM3 (ref 3.4–10.8)

## 2021-06-27 PROCEDURE — 82962 GLUCOSE BLOOD TEST: CPT

## 2021-06-27 PROCEDURE — 25010000002 ACYCLOVIR PER 5 MG: Performed by: INTERNAL MEDICINE

## 2021-06-27 PROCEDURE — 80048 BASIC METABOLIC PNL TOTAL CA: CPT | Performed by: HOSPITALIST

## 2021-06-27 PROCEDURE — 94799 UNLISTED PULMONARY SVC/PX: CPT

## 2021-06-27 PROCEDURE — 85025 COMPLETE CBC W/AUTO DIFF WBC: CPT | Performed by: HOSPITALIST

## 2021-06-27 RX ORDER — HYDROCODONE BITARTRATE AND ACETAMINOPHEN 5; 325 MG/1; MG/1
1 TABLET ORAL EVERY 4 HOURS PRN
Qty: 20 TABLET | Refills: 0 | Status: SHIPPED | OUTPATIENT
Start: 2021-06-27 | End: 2021-07-03

## 2021-06-27 RX ORDER — VALACYCLOVIR HYDROCHLORIDE 1 G/1
1000 TABLET, FILM COATED ORAL 3 TIMES DAILY
Qty: 30 TABLET | Refills: 0 | Status: SHIPPED | OUTPATIENT
Start: 2021-06-27

## 2021-06-27 RX ADMIN — DICYCLOMINE HYDROCHLORIDE 10 MG: 10 CAPSULE ORAL at 09:48

## 2021-06-27 RX ADMIN — SERTRALINE HYDROCHLORIDE 50 MG: 50 TABLET, FILM COATED ORAL at 09:48

## 2021-06-27 RX ADMIN — BUDESONIDE AND FORMOTEROL FUMARATE DIHYDRATE 2 PUFF: 160; 4.5 AEROSOL RESPIRATORY (INHALATION) at 08:04

## 2021-06-27 RX ADMIN — GABAPENTIN 300 MG: 300 CAPSULE ORAL at 09:48

## 2021-06-27 RX ADMIN — ACYCLOVIR SODIUM 590 MG: 50 INJECTION, SOLUTION INTRAVENOUS at 03:16

## 2021-06-27 RX ADMIN — HYDROCODONE BITARTRATE AND ACETAMINOPHEN 1 TABLET: 5; 325 TABLET ORAL at 09:47

## 2021-06-27 RX ADMIN — ROFLUMILAST 500 MCG: 500 TABLET ORAL at 09:48

## 2021-06-27 RX ADMIN — ACYCLOVIR SODIUM 590 MG: 50 INJECTION, SOLUTION INTRAVENOUS at 11:07

## 2021-06-27 RX ADMIN — CYCLOBENZAPRINE 10 MG: 10 TABLET, FILM COATED ORAL at 09:53

## 2021-06-28 ENCOUNTER — READMISSION MANAGEMENT (OUTPATIENT)
Dept: CALL CENTER | Facility: HOSPITAL | Age: 66
End: 2021-06-28

## 2021-06-28 LAB
BACTERIA SPEC AEROBE CULT: NORMAL
BACTERIA SPEC AEROBE CULT: NORMAL

## 2021-06-28 NOTE — OUTREACH NOTE
Prep Survey      Responses   [facility] patient discharged from?  [place]   Is LACE score < 7 ?  No   Emergency Room discharge w/ pulse ox?  No   Eligibility  Readm Mgmt   Discharge diagnosis  Herpes zoster with meningitis   Does the patient have one of the following disease processes/diagnoses(primary or secondary)?  Other   Does the patient have Home health ordered?  Yes   What is the Home health agency?    [facility]    Is there a DME ordered?  No   Prep survey completed?  Yes          Nasreen Perez RN

## 2021-06-30 LAB — L MONOCYTOG AB TITR SER CF: NORMAL {TITER}

## 2021-07-01 ENCOUNTER — READMISSION MANAGEMENT (OUTPATIENT)
Dept: CALL CENTER | Facility: HOSPITAL | Age: 66
End: 2021-07-01

## 2021-07-01 NOTE — OUTREACH NOTE
Medical Week 1 Survey      Responses   [facility] patient discharged from?  [place]   Does the patient have one of the following disease processes/diagnoses(primary or secondary)?  Other   Week 1 attempt successful?  No   Unsuccessful attempts  Attempt 1          Debbi Tate RN

## 2021-07-06 ENCOUNTER — READMISSION MANAGEMENT (OUTPATIENT)
Dept: CALL CENTER | Facility: HOSPITAL | Age: 66
End: 2021-07-06

## 2021-07-06 NOTE — OUTREACH NOTE
Medical Week 2 Survey      Responses   [facility] patient discharged from?  [place]   Does the patient have one of the following disease processes/diagnoses(primary or secondary)?  Other   Week 2 attempt successful?  Yes   Call start time  1741   Discharge diagnosis  Herpes zoster with meningitis   Call end time  1745   Meds reviewed with patient/caregiver?  Yes   Is the patient taking all medications as directed (includes completed medication regime)?  Yes   Has the patient kept scheduled appointments due by today?  N/A   What is the patient's perception of their health status since discharge?  Improving   If the patient is a current smoker, are they able to teach back resources for cessation?  Not a smoker   Week 2 Call Completed?  Yes   Wrap up additional comments  Improved.          Chuyita Reed RN

## 2021-07-14 ENCOUNTER — READMISSION MANAGEMENT (OUTPATIENT)
Dept: CALL CENTER | Facility: HOSPITAL | Age: 66
End: 2021-07-14

## 2021-07-19 ENCOUNTER — READMISSION MANAGEMENT (OUTPATIENT)
Dept: CALL CENTER | Facility: HOSPITAL | Age: 66
End: 2021-07-19

## 2021-07-19 NOTE — OUTREACH NOTE
Medical Week 3 Survey      Responses   [facility] patient discharged from?  [place]   Does the patient have one of the following disease processes/diagnoses(primary or secondary)?  Other   Week 3 attempt successful?  Yes   Call start time  1551   Call end time  1555   Meds reviewed with patient/caregiver?  Yes   Is the patient taking all medications as directed (includes completed medication regime)?  Yes   Comments regarding appointments  Pt has followed up appointments.   Does the patient have a primary care provider?   Yes   Has home health visited the patient within 72 hours of discharge?  N/A   Psychosocial issues?  No   Week 3 Call Completed?  Yes   Wrap up additional comments  Pt reports feeling much better. Pt continues to have some numbness to finger due to the shingles but that is improving.          Mattie Han RN

## 2022-04-14 ENCOUNTER — TRANSCRIBE ORDERS (OUTPATIENT)
Dept: ADMINISTRATIVE | Facility: HOSPITAL | Age: 67
End: 2022-04-14

## 2022-04-14 DIAGNOSIS — E04.1 THYROID NODULE: Primary | ICD-10-CM

## 2024-09-20 NOTE — OUTREACH NOTE
Medical Week 3 Survey      Responses   [facility] patient discharged from?  [place]   Does the patient have one of the following disease processes/diagnoses(primary or secondary)?  Other   Week 3 attempt successful?  No   Unsuccessful attempts  Attempt 1          Mattie Han RN  
show

## 2025-02-04 ENCOUNTER — OFFICE VISIT (OUTPATIENT)
Dept: INTERNAL MEDICINE | Facility: CLINIC | Age: 70
End: 2025-02-04
Payer: MEDICARE

## 2025-02-04 VITALS
BODY MASS INDEX: 29.41 KG/M2 | DIASTOLIC BLOOD PRESSURE: 82 MMHG | SYSTOLIC BLOOD PRESSURE: 124 MMHG | HEIGHT: 66 IN | OXYGEN SATURATION: 96 % | HEART RATE: 92 BPM | WEIGHT: 183 LBS

## 2025-02-04 DIAGNOSIS — Z23 NEED FOR INFLUENZA VACCINATION: ICD-10-CM

## 2025-02-04 DIAGNOSIS — R73.03 PREDIABETES: ICD-10-CM

## 2025-02-04 DIAGNOSIS — Z12.31 ENCOUNTER FOR SCREENING MAMMOGRAM FOR MALIGNANT NEOPLASM OF BREAST: ICD-10-CM

## 2025-02-04 DIAGNOSIS — E78.00 PURE HYPERCHOLESTEROLEMIA: ICD-10-CM

## 2025-02-04 DIAGNOSIS — M79.641 BILATERAL HAND PAIN: ICD-10-CM

## 2025-02-04 DIAGNOSIS — Z78.0 POST-MENOPAUSAL: ICD-10-CM

## 2025-02-04 DIAGNOSIS — Z11.59 NEED FOR HEPATITIS C SCREENING TEST: ICD-10-CM

## 2025-02-04 DIAGNOSIS — M25.531 BILATERAL WRIST PAIN: ICD-10-CM

## 2025-02-04 DIAGNOSIS — M25.40 SWOLLEN JOINT: ICD-10-CM

## 2025-02-04 DIAGNOSIS — M25.532 BILATERAL WRIST PAIN: ICD-10-CM

## 2025-02-04 DIAGNOSIS — M79.642 BILATERAL HAND PAIN: ICD-10-CM

## 2025-02-04 DIAGNOSIS — J42 CHRONIC BRONCHITIS, UNSPECIFIED CHRONIC BRONCHITIS TYPE: ICD-10-CM

## 2025-02-04 DIAGNOSIS — Z76.89 ENCOUNTER TO ESTABLISH CARE WITH NEW DOCTOR: Primary | ICD-10-CM

## 2025-02-04 DIAGNOSIS — Z13.820 SCREENING FOR OSTEOPOROSIS: ICD-10-CM

## 2025-02-04 PROBLEM — E11.65 TYPE 2 DIABETES MELLITUS WITH HYPERGLYCEMIA, WITHOUT LONG-TERM CURRENT USE OF INSULIN: Status: RESOLVED | Noted: 2021-06-23 | Resolved: 2025-02-04

## 2025-02-04 PROBLEM — E66.9 OBESITY (BMI 30-39.9): Status: RESOLVED | Noted: 2021-06-23 | Resolved: 2025-02-04

## 2025-02-04 PROBLEM — Z86.0100 HISTORY OF COLONIC POLYPS: Status: ACTIVE | Noted: 2018-08-16

## 2025-02-04 PROCEDURE — 1160F RVW MEDS BY RX/DR IN RCRD: CPT | Performed by: STUDENT IN AN ORGANIZED HEALTH CARE EDUCATION/TRAINING PROGRAM

## 2025-02-04 PROCEDURE — 1159F MED LIST DOCD IN RCRD: CPT | Performed by: STUDENT IN AN ORGANIZED HEALTH CARE EDUCATION/TRAINING PROGRAM

## 2025-02-04 PROCEDURE — G2211 COMPLEX E/M VISIT ADD ON: HCPCS | Performed by: STUDENT IN AN ORGANIZED HEALTH CARE EDUCATION/TRAINING PROGRAM

## 2025-02-04 PROCEDURE — G0008 ADMIN INFLUENZA VIRUS VAC: HCPCS | Performed by: STUDENT IN AN ORGANIZED HEALTH CARE EDUCATION/TRAINING PROGRAM

## 2025-02-04 PROCEDURE — 90662 IIV NO PRSV INCREASED AG IM: CPT | Performed by: STUDENT IN AN ORGANIZED HEALTH CARE EDUCATION/TRAINING PROGRAM

## 2025-02-04 PROCEDURE — 99204 OFFICE O/P NEW MOD 45 MIN: CPT | Performed by: STUDENT IN AN ORGANIZED HEALTH CARE EDUCATION/TRAINING PROGRAM

## 2025-02-04 RX ORDER — ATORVASTATIN CALCIUM 20 MG/1
20 TABLET, FILM COATED ORAL DAILY
COMMUNITY
End: 2025-02-07 | Stop reason: SDUPTHER

## 2025-02-04 NOTE — PROGRESS NOTES
"Chief Complaint  Establish Care, prediabetes, HLD, COPD, swollen hand/wrist joints bilaterally    Subjective        Ava Lagos presents to clinic today to establish care with a new provider. Patient was previously following with Dr. Carter.   History of Present Illness  She has been experiencing the development of small nodules on her hands/wrists over the past month, which are mildly tender upon palpation. She reports a decrease in  strength but does not experience any associated pain in her fingers or wrists. The onset of these symptoms was approximately 6 weeks ago. She reports no systemic symptoms such as fevers or chills. She has not sought any pharmacological intervention for the hand pain. She has no known history of arthritis, including gout or rheumatoid arthritis.     She has a diagnosis of COPD but does not follow up with pulmonology. She reports that her breathing is fine overall. Currently taking Dulera and roflumilast for this.    She has a history of colon polyps and is scheduling a colonoscopy for March. She has not taken any vaccines this year. She received a pneumococcal vaccine about a year ago. She has not had a bone mineral density screening test this year. She does not follow with a gynecologist. She has never been diagnosed with osteopenia or osteoporosis. Her last mammogram was about 3 years ago. She does not see any specialists or specialty providers. She does not need any refills on her medications at this time.    She has no history of diabetes and seems confused that metformin is listed as an active medication for her. She states that she is not taking it at this time. Does note that she has been told that her A1c levels are in the range of prediabetes previously.    FAMILY HISTORY  Her older brother has ankylosing spondylitis.    Objective   Vital Signs:  /82 (BP Location: Right arm, Patient Position: Sitting, Cuff Size: Adult)   Pulse 92   Ht 167.6 cm (66\")   Wt 83 " "kg (183 lb)   SpO2 96%   BMI 29.54 kg/m²   Estimated body mass index is 29.54 kg/m² as calculated from the following:    Height as of this encounter: 167.6 cm (66\").    Weight as of this encounter: 83 kg (183 lb).        Physical Exam  Constitutional:       General: She is not in acute distress.     Appearance: Normal appearance.   Pulmonary:      Effort: Pulmonary effort is normal. No respiratory distress.   Musculoskeletal:         General: Swelling present. No deformity.      Comments: Multiple swollen, erythematous MCP joints noted on bilateral hands that are TTP. There is a swollen nodule also located over the first CMC joint on the left hand.     Skin:     General: Skin is warm and dry.   Neurological:      General: No focal deficit present.      Mental Status: She is oriented to person, place, and time. Mental status is at baseline.   Psychiatric:         Mood and Affect: Mood normal.         Behavior: Behavior normal.     Result Review :  The following data was reviewed by: Linda Marcano MD on 02/04/2025:    Lab work frmo 3/20/2024:  - CMP WNL (within normal limits)   - Lipid panel with elevated total and LDL cholesterol (202/114)  - Hgb A1c 6.4%  - TSH WNL   - CBC WNL               Current Outpatient Medications:     ALPRAZolam (XANAX) 0.5 MG tablet, Take 1 tablet by mouth 2 (Two) Times a Day As Needed for Anxiety., Disp: , Rfl:     atorvastatin (LIPITOR) 20 MG tablet, Take 1 tablet by mouth Daily., Disp: , Rfl:     cyclobenzaprine (FLEXERIL) 10 MG tablet, Take 1 tablet by mouth 2 (Two) Times a Day As Needed., Disp: , Rfl:     dicyclomine (BENTYL) 20 MG tablet, Take 1 tablet by mouth 3 (Three) Times a Day., Disp: , Rfl:     mometasone-formoterol (DULERA 200) 200-5 MCG/ACT inhaler, Inhale 1 puff 2 (Two) Times a Day., Disp: , Rfl:     sertraline (ZOLOFT) 50 MG tablet, Take 1 tablet by mouth Daily., Disp: , Rfl:     roflumilast (DALIRESP) 500 MCG tablet tablet, Take  by mouth Daily., Disp: , Rfl:     "   Assessment and Plan   Diagnoses and all orders for this visit:    1. Encounter to establish care with new doctor (Primary)  -     Comprehensive Metabolic Panel  -     CBC & Differential  -     Lipid Panel With LDL / HDL Ratio    2. Bilateral hand pain  -     Sedimentation rate, automated  -     C-reactive protein  -     Uric acid  -     XR Hand 3+ View Left; Future  -     XR Hand 3+ View Right; Future  -     Cyclic Citrul Peptide Antibody, IgG / IgA  -     Rheumatoid Factor, Quant  -     BRADFORD With / DsDNA, RNP, Sjogrens A / B, Smith    3. Swollen joint  -     Sedimentation rate, automated  -     C-reactive protein  -     Uric acid  -     XR Hand 3+ View Left; Future  -     XR Hand 3+ View Right; Future  -     Cyclic Citrul Peptide Antibody, IgG / IgA  -     Rheumatoid Factor, Quant  -     BRADFORD With / DsDNA, RNP, Sjogrens A / B, Smith    4. Chronic bronchitis, unspecified chronic bronchitis type  -     Complete PFT - Pre & Post Bronchodilator; Future    5. Screening for osteoporosis  -     DEXA Bone Density Axial    6. Post-menopausal  -     DEXA Bone Density Axial    7. Encounter for screening mammogram for malignant neoplasm of breast  -     Mammo screening digital tomosynthesis bilateral w CAD; Future    8. Prediabetes  -     CBC & Differential  -     Hemoglobin A1c  -     Microalbumin / Creatinine Urine Ratio - Urine, Clean Catch    9. Need for hepatitis C screening test  -     Hepatitis C Antibody    10. Bilateral wrist pain  -     Sedimentation rate, automated  -     C-reactive protein  -     Uric acid  -     XR Hand 3+ View Left; Future  -     XR Hand 3+ View Right; Future  -     Cyclic Citrul Peptide Antibody, IgG / IgA  -     Rheumatoid Factor, Quant  -     BRADFORD With / DsDNA, RNP, Sjogrens A / B, Smith    11. Pure hypercholesterolemia  -     Lipid Panel With LDL / HDL Ratio             Follow Up   Return in about 6 months (around 8/4/2025) for Annual Medicare Wellness, lab work.    Patient was given  instructions and counseling regarding her condition or for health maintenance advice. Please see specific information pulled into the AVS if appropriate.     Linda Marcano MD    Patient or patient representative verbalized consent for the use of Ambient Listening during the visit with  Linda Marcano MD for chart documentation. 2/4/2025  15:03 EST

## 2025-02-05 PROBLEM — R73.03 PREDIABETES: Status: ACTIVE | Noted: 2025-02-05

## 2025-02-05 LAB
ALBUMIN SERPL-MCNC: 4.8 G/DL (ref 3.9–4.9)
ALBUMIN/CREAT UR: 3 MG/G CREAT (ref 0–29)
ALP SERPL-CCNC: 114 IU/L (ref 44–121)
ALT SERPL-CCNC: 19 IU/L (ref 0–32)
ANA SER QL: NEGATIVE
AST SERPL-CCNC: 19 IU/L (ref 0–40)
BASOPHILS # BLD AUTO: 0.1 X10E3/UL (ref 0–0.2)
BASOPHILS NFR BLD AUTO: 1 %
BILIRUB SERPL-MCNC: 0.4 MG/DL (ref 0–1.2)
BUN SERPL-MCNC: 11 MG/DL (ref 8–27)
BUN/CREAT SERPL: 12 (ref 12–28)
CALCIUM SERPL-MCNC: 9.5 MG/DL (ref 8.7–10.3)
CCP IGA+IGG SERPL IA-ACNC: 2 UNITS (ref 0–19)
CHLORIDE SERPL-SCNC: 102 MMOL/L (ref 96–106)
CHOLEST SERPL-MCNC: 248 MG/DL (ref 100–199)
CO2 SERPL-SCNC: 27 MMOL/L (ref 20–29)
CREAT SERPL-MCNC: 0.9 MG/DL (ref 0.57–1)
CREAT UR-MCNC: 146.6 MG/DL
CRP SERPL-MCNC: 4 MG/L (ref 0–10)
EGFRCR SERPLBLD CKD-EPI 2021: 70 ML/MIN/1.73
EOSINOPHIL # BLD AUTO: 0.1 X10E3/UL (ref 0–0.4)
EOSINOPHIL NFR BLD AUTO: 2 %
ERYTHROCYTE [DISTWIDTH] IN BLOOD BY AUTOMATED COUNT: 12 % (ref 11.7–15.4)
ERYTHROCYTE [SEDIMENTATION RATE] IN BLOOD BY WESTERGREN METHOD: 10 MM/HR (ref 0–40)
GLOBULIN SER CALC-MCNC: 3 G/DL (ref 1.5–4.5)
GLUCOSE SERPL-MCNC: 84 MG/DL (ref 70–99)
HBA1C MFR BLD: 6.6 % (ref 4.8–5.6)
HCT VFR BLD AUTO: 44.3 % (ref 34–46.6)
HCV IGG SERPL QL IA: NON REACTIVE
HDLC SERPL-MCNC: 76 MG/DL
HGB BLD-MCNC: 14.6 G/DL (ref 11.1–15.9)
IMM GRANULOCYTES # BLD AUTO: 0 X10E3/UL (ref 0–0.1)
IMM GRANULOCYTES NFR BLD AUTO: 0 %
LDLC SERPL CALC-MCNC: 152 MG/DL (ref 0–99)
LDLC/HDLC SERPL: 2 RATIO (ref 0–3.2)
LYMPHOCYTES # BLD AUTO: 2.2 X10E3/UL (ref 0.7–3.1)
LYMPHOCYTES NFR BLD AUTO: 28 %
MCH RBC QN AUTO: 30 PG (ref 26.6–33)
MCHC RBC AUTO-ENTMCNC: 33 G/DL (ref 31.5–35.7)
MCV RBC AUTO: 91 FL (ref 79–97)
MICROALBUMIN UR-MCNC: 5 UG/ML
MONOCYTES # BLD AUTO: 0.5 X10E3/UL (ref 0.1–0.9)
MONOCYTES NFR BLD AUTO: 7 %
NEUTROPHILS # BLD AUTO: 4.9 X10E3/UL (ref 1.4–7)
NEUTROPHILS NFR BLD AUTO: 62 %
PLATELET # BLD AUTO: 317 X10E3/UL (ref 150–450)
POTASSIUM SERPL-SCNC: 4.4 MMOL/L (ref 3.5–5.2)
PROT SERPL-MCNC: 7.8 G/DL (ref 6–8.5)
RBC # BLD AUTO: 4.86 X10E6/UL (ref 3.77–5.28)
RHEUMATOID FACT SERPL-ACNC: 20.2 IU/ML
SODIUM SERPL-SCNC: 141 MMOL/L (ref 134–144)
TRIGL SERPL-MCNC: 114 MG/DL (ref 0–149)
URATE SERPL-MCNC: 4 MG/DL (ref 3–7.2)
VLDLC SERPL CALC-MCNC: 20 MG/DL (ref 5–40)
WBC # BLD AUTO: 7.8 X10E3/UL (ref 3.4–10.8)

## 2025-02-07 DIAGNOSIS — M79.642 BILATERAL HAND PAIN: ICD-10-CM

## 2025-02-07 DIAGNOSIS — R76.8 RHEUMATOID FACTOR POSITIVE: ICD-10-CM

## 2025-02-07 DIAGNOSIS — R76.8 RHEUMATOID FACTOR POSITIVE: Primary | ICD-10-CM

## 2025-02-07 DIAGNOSIS — E78.00 PURE HYPERCHOLESTEROLEMIA: Primary | ICD-10-CM

## 2025-02-07 DIAGNOSIS — M79.641 BILATERAL HAND PAIN: ICD-10-CM

## 2025-02-07 DIAGNOSIS — M25.40 SWOLLEN JOINT: ICD-10-CM

## 2025-02-07 RX ORDER — ATORVASTATIN CALCIUM 40 MG/1
40 TABLET, FILM COATED ORAL DAILY
Qty: 90 TABLET | Refills: 1 | Status: SHIPPED | OUTPATIENT
Start: 2025-02-07

## 2025-02-07 RX ORDER — PREDNISONE 20 MG/1
TABLET ORAL
Qty: 25 TABLET | Refills: 0 | Status: SHIPPED | OUTPATIENT
Start: 2025-02-07 | End: 2025-02-28

## 2025-03-13 ENCOUNTER — TELEPHONE (OUTPATIENT)
Dept: INTERNAL MEDICINE | Facility: CLINIC | Age: 70
End: 2025-03-13

## 2025-03-13 NOTE — TELEPHONE ENCOUNTER
Called pt about overdue xray of hand order in her chart, she said that she doesn't want to get it anymore. Ok to cancel?

## 2025-03-28 ENCOUNTER — HOSPITAL ENCOUNTER (OUTPATIENT)
Facility: HOSPITAL | Age: 70
Discharge: HOME | End: 2025-03-28
Payer: MEDICARE

## 2025-03-28 ENCOUNTER — TELEPHONE (OUTPATIENT)
Dept: INTERNAL MEDICINE | Facility: CLINIC | Age: 70
End: 2025-03-28
Payer: COMMERCIAL

## 2025-03-28 DIAGNOSIS — M79.642 BILATERAL HAND PAIN: Primary | ICD-10-CM

## 2025-03-28 DIAGNOSIS — M25.40 SWOLLEN JOINT: ICD-10-CM

## 2025-03-28 DIAGNOSIS — M79.641 BILATERAL HAND PAIN: ICD-10-CM

## 2025-03-28 DIAGNOSIS — M79.641 BILATERAL HAND PAIN: Primary | ICD-10-CM

## 2025-03-28 DIAGNOSIS — M79.642 BILATERAL HAND PAIN: ICD-10-CM

## 2025-03-28 PROCEDURE — 73130 X-RAY EXAM OF HAND: CPT

## 2025-03-28 NOTE — TELEPHONE ENCOUNTER
Patient came into the office wanting get her XR of her hand reordered. I discussed the order with Dr. Marcano and the orders were placed. Patient went down to get it done.    patient has presumptive MRSA resulted from culture swab done 2/24 by medicine team.

## 2025-04-29 ENCOUNTER — OFFICE VISIT (OUTPATIENT)
Age: 70
End: 2025-04-29
Payer: MEDICARE

## 2025-04-29 ENCOUNTER — LAB (OUTPATIENT)
Facility: HOSPITAL | Age: 70
End: 2025-04-29
Payer: MEDICARE

## 2025-04-29 ENCOUNTER — HOSPITAL ENCOUNTER (OUTPATIENT)
Facility: HOSPITAL | Age: 70
Discharge: HOME | End: 2025-04-29
Payer: MEDICARE

## 2025-04-29 VITALS
BODY MASS INDEX: 28.87 KG/M2 | HEART RATE: 98 BPM | TEMPERATURE: 98.2 F | SYSTOLIC BLOOD PRESSURE: 126 MMHG | HEIGHT: 66 IN | OXYGEN SATURATION: 98 % | DIASTOLIC BLOOD PRESSURE: 70 MMHG | WEIGHT: 179.6 LBS

## 2025-04-29 DIAGNOSIS — M79.641 BILATERAL HAND PAIN: Primary | ICD-10-CM

## 2025-04-29 DIAGNOSIS — M79.641 BILATERAL HAND PAIN: ICD-10-CM

## 2025-04-29 DIAGNOSIS — M54.2 NECK PAIN: ICD-10-CM

## 2025-04-29 DIAGNOSIS — Z82.69 FAMILY HISTORY OF ANKYLOSING SPONDYLITIS: ICD-10-CM

## 2025-04-29 DIAGNOSIS — M79.642 BILATERAL HAND PAIN: Primary | ICD-10-CM

## 2025-04-29 DIAGNOSIS — M79.642 BILATERAL HAND PAIN: ICD-10-CM

## 2025-04-29 DIAGNOSIS — R76.8 ELEVATED RHEUMATOID FACTOR: ICD-10-CM

## 2025-04-29 PROCEDURE — 72040 X-RAY EXAM NECK SPINE 2-3 VW: CPT

## 2025-04-29 PROCEDURE — 36415 COLL VENOUS BLD VENIPUNCTURE: CPT | Performed by: STUDENT IN AN ORGANIZED HEALTH CARE EDUCATION/TRAINING PROGRAM

## 2025-04-29 RX ORDER — PREDNISONE 5 MG/1
5 TABLET ORAL DAILY
COMMUNITY

## 2025-04-29 NOTE — PROGRESS NOTES
RHEUMATOLOGY NEW PATIENT VISIT  2025  Patient Name: Ava Lagos : 1955 Medical Record: [id number]  PCP: Linda Marcano MD  Referring provider: Linda Marcano  REASON FOR CONSULTATION  Bilateral hand pain, rheumatoid factor positive    History of Present Illness  Ava Lagos is a 70 y.o. female who presents for evaluation of hand pain and elevated rheumatoid factor.    She was referred due to hand pain and a slightly elevated rheumatoid factor. She reports no personal history of rheumatoid arthritis but mentions that her mother had Raynaud's disease, which led to the amputation of all her digits except the right thumb. Her older brother has ankylosing spondylitis, a condition he has managed since his teenage years.     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 She [place] , during which they handled the electrical and plumbing work. She did not experience significant hand discomfort until after this project.     She first noticed her hand pain towards the end of last year, which she believes may have been triggered by an injury. She reports no clumsiness or difficulty handling objects.    She experiences mild morning stiffness in her hands, which resolves quickly. She also reports occasional swelling in her knuckles, particularly in her right hand. She reports no history of psoriasis, gout, oral sores, hair loss, or purpura-like rashes. She has a pet cat but reports no recent scratches. She reports no history of hepatitis infection.    She was prescribed prednisone for her hand pain and she found this very helpful.     Other review of her Rheum history:  She denies prior dactylitis, enthesitis, psoriasis, gout, recurrent urinary or GI infections or preceding flu-like sxs.   She reports no history of deep vein thrombosis, pulmonary embolism, stroke, seizures,  or persistent headaches. She occasionally experiences eye dryness, which she attributes to allergies. She reports no recent swelling of her parotid or submandibular glands,  unexplained rashes, or cancer.     She has a history of total knee replacement on the right side due to osteoarthritis and favors her left knee.  She has a history of neck pain, which she manages with regular exercise.     SOCIAL HISTORY  She does not smoke. She drinks wine 2 or 3 times a week. 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    FAMILY HISTORY  Her mother had Raynaud's disease and was a smoker. Her older brother has ankylosing spondylitis. Her father possibly had ankylosing spondylitis but passed away when she was [age], so it is not confirmed. No family history of lupus or vasculitis.    MEDICATIONS  Current: Mucinex  Past: prednisone    Results  Laboratory Studies  Rheumatoid factor was 20. CCP was normal. CRP and ESR were normal. BRADFORD testing was negative. Hepatitis C test was negative. A1c was 6.6. Cholesterol and LDL were high. Uric acid levels were normal.  2/4/25  BRADFORD negative, hep C negative  A1c 6.6, lipid profile tot chol-248,   CMP within normal, RF 20.2, CCP uric acid, CRP, sed rate WNL,    Pert Imag: 3/28/2025  IMPRESSION:  1. No fracture or dislocation.  2. Moderate to severe left first carpometacarpal joint osteoarthritis.  3. Questionable for an erosion along the dorsal margin of the right  third metatarsal head on the lateral radiograph versus a degenerative  finding, not seen on other views.    Imaging  X-rays of the hands showed no fracture or moderate to severe left first CMC joint osteoarthritis. Questionable erosion on the dorsal surface of the right metatarsal head.    Current Outpatient Medications:     ALPRAZolam (XANAX) 0.5 MG tablet, Take 1 tablet by mouth 2 (Two) Times a Day As Needed for Anxiety., Disp: , Rfl:     atorvastatin (Lipitor) 40 MG tablet, Take 1 tablet by mouth Daily., Disp: 90  "tablet, Rfl: 1    cyclobenzaprine (FLEXERIL) 10 MG tablet, Take 1 tablet by mouth 2 (Two) Times a Day As Needed., Disp: , Rfl:     dicyclomine (BENTYL) 20 MG tablet, Take 1 tablet by mouth 3 (Three) Times a Day., Disp: , Rfl:     mometasone-formoterol (DULERA 200) 200-5 MCG/ACT inhaler, Inhale 1 puff 2 (Two) Times a Day., Disp: , Rfl:     predniSONE (DELTASONE) 5 MG tablet, Take 1 tablet by mouth Daily., Disp: , Rfl:     roflumilast (DALIRESP) 500 MCG tablet tablet, Take  by mouth Daily., Disp: , Rfl:     sertraline (ZOLOFT) 50 MG tablet, Take 1 tablet by mouth Daily., Disp: , Rfl:      There are no discontinued medications.     Past Medical History:   Diagnosis Date    Arthritis     Asthma     COPD (chronic obstructive pulmonary disease)     Diabetes mellitus     Diverticulitis     Elevated cholesterol     Heart murmur     Neck pain     Right knee pain     Stroke        Past Surgical History:   Procedure Laterality Date    BUNIONECTOMY Bilateral      SECTION      COLONOSCOPY      GALLBLADDER SURGERY      KNEE ARTHROSCOPY Right     OVARIAN CYST SURGERY      TOTAL KNEE ARTHROPLASTY Right 2020    Procedure: TOTAL KNEE ARTHROPLASTY;  Surgeon: Robbin Jennings II, MD;  Location: [facility];  Service: Orthopedics;  Laterality: Right;    UMBILICAL HERNIA REPAIR       ALLERGIES  No Known Allergies   Physical Exam      Vitals:    25 1101   BP: 126/70   BP Location: Left arm   Patient Position: Sitting   Cuff Size: Adult   Pulse: 98   Temp: 98.2 °F (36.8 °C)   TempSrc: Oral   SpO2: 98%   Weight: 81.5 kg (179 lb 9.6 oz)   Height: 167.6 cm (65.98\")     Gen: Well-developed, well-nourished adult in no apparent distress. Pleasant and cooperative. Alert and oriented.   HEENT: EOMI, MMM, oropharynx clear without oral ulcers, no lacrimal gland enlargement, normal salivary pooling, normal temporal arteries without tenderness or beading.  Chest: Normal work of breathing. CTAB without " wheezing/rhonchi/rales. ??  CV: RRR, normal S1/S2, no murmurs/rubs/gallops heard. ??  Extremities: Warm, 2+ distal pulses, no cyanosis, clubbing, or edema.  Neuro: Good comprehension/cognition. Muscle strength 5/5 in all extremities. Gait normal.??  ??Skin: No alopecia, nail change (including no nail pitting), rashes, bruising, petechiae, sclerodactyly, digital pits, telangiectasias, tophi, appreciable calcinosis, or nodules.??    Comprehensive Musculoskeletal Examination:?  - Jaw, neck without limited ROM.?  - Shoulders, elbows, wrists, knees, ankles, feet/toes: No deformity, erythema, warmth, swelling, effusion, tenderness, or limited ROM.??  - Hand /fingers: There is no obliteration of the grooves in the right MCPs of the right hand. Some synovial thickening noted in MCPs of the right hand. No e/o synovitis.    - Lumbosacral spine and hips without limited ROM.?? Negative ROYA.    LABS AND IMAGING ll laboratory data was reviewed and relevant values are noted as below.    See pertinent lab comments in HPI    Risk factors:  Hemoglobin A1C (%)   Date Value   02/04/2025 6.6 (H)   06/24/2021 5.95 (H)   10/18/2014 6.3 (H)     LDL Cholesterol  (mg/dL)   Date Value   10/18/2014 159 (H)     LDL Chol Calc (NIH) (mg/dL)   Date Value   02/04/2025 152 (H)     XR Spine Cervical 2 or 3 View  Narrative: XR SPINE CERVICAL 2 OR 3 VW-     HISTORY: Neck pain  and limited ROM - with family history of Ankylosing  Spondylitis - eval for inflammatory arthritis; M79.641-Pain in right  hand; M79.642-Pain in left hand; R76.8-Other specified abnormal  immunological findings in serum; M54.2-Cervicalgia; Z82.69-Family  history of other diseases of the musculoskeletal system and connective  tissue     COMPARISON: None.     FINDINGS: Reversal of the normal cervical lordotic curvature centered at  C4-5. Disc space narrowing is greatest at C5-6 and C6-7 though also  present at C4-5. There is endplate spur formation at these levels. 3  mm  anterolisthesis C3 with respect to C4. Bilateral facet arthritis.  Prevertebral soft tissues appear normal. No evidence for fracture or  acute abnormality.     Impression: 1. Reversal of the normal cervical lordotic curvature.  2. Multilevel degenerative disc disease appears greatest at C5-6 and  C6-7.  3. Bilateral facet arthritis.        This report was finalized on 4/30/2025 8:55 AM by Roney Wilson M.D  on Workstation: [id number]       Assessment & Plan  Ava Lagos is a 70 y.o. female who presents for evaluation of hand pain and elevated rheumatoid factor.     She describes chronic hand pain (about 1 year), affecting right >left hand.   She has a slightly elevated rheumatoid factor (20.2), but her CCP, CRP, and ESR are normal.     Today's exam does not show evidence of synovitis in the hand, she does have some bony prominence/hypertrophy especially on the right hand. X-rays show evidence of osteoarthritis rather than inflammatory arthritis.       The patient's rheumatoid factor is low titer (20.2), but this is not specific for rheumatoid arthritis. Other factors such as hepatitis C, obesity, non-specific inflammation/infection,can also elevate this marker.  Low suspicion for Rheumatoid arthritis .    The patient has evidence of osteoarthritis in her hands, particularly in the left first CMC joint. X-rays show moderate to severe osteoarthritis in this joint.  In addition, she c/o neck pain and has a family h/o of AS. Will check a cervical xray and HLA b27 for features of or predisposition to spondyloarthritis     Plan:   - Recommend Conservative management options include using over-the-counter Voltaren, avoiding overuse of the hand, and using splints at night. If the pain becomes bothersome, a hand surgeon can inject the joint to alleviate pain.   Oral pain medication is also an option.    - Will obtain plain Cervical x-rays to check for any fusion or osteoarthritis that may suggest axial  disease from spondyloarthritis    - The patient was advised to continue with physical therapy and exercises to manage symptoms.    - Will check HLA- B-27    - Will notify patient about lab and xray.     - Follow up as needed in case of hand flare     Diagnoses and all orders for this visit:    1. Bilateral hand pain (Primary)  -     HLA-B27 Antigen  -     XR Spine Cervical 2 or 3 View; Future    2. Neck pain  -     HLA-B27 Antigen  -     XR Spine Cervical 2 or 3 View; Future    3. Elevated rheumatoid factor  -     HLA-B27 Antigen  -     XR Spine Cervical 2 or 3 View; Future    4. Family history of ankylosing spondylitis  -     HLA-B27 Antigen  -     XR Spine Cervical 2 or 3 View; Future      Patient or patient representative verbalized consent for the use of Ambient Listening during the visit with  Ama Aponte MD for chart documentation.     I spent 45 minutes caring for Ava on this date of service. This time includes time spent by me in the following activities:preparing for the visit, reviewing tests, obtaining and/or reviewing a separately obtained history, performing a medically appropriate examination and/or evaluation , counseling and educating the patient/family/caregiver, ordering medications, tests, or procedures, documenting information in the medical record, and independently interpreting results and communicating that information with the patient/family/caregiver

## 2025-05-05 LAB — HLA-B27 QL NAA+PROBE: NEGATIVE

## 2025-05-08 ENCOUNTER — RESULTS FOLLOW-UP (OUTPATIENT)
Age: 70
End: 2025-05-08
Payer: COMMERCIAL

## 2025-05-08 NOTE — PROGRESS NOTES
Please notify patient    Ms. Lagos,  Thank you for having your blood work and x-rays done  Check for HLA-B27 which is a genetic marker for predisposition to spondylarthritis is negative  Your neck x-ray shows multilevel degenerative disc disease and changes consistent with osteoarthritis.  Please continue with current physical therapy.  No additional intervention from rheumatology standpoint.

## 2025-05-17 PROBLEM — Z82.69 FAMILY HISTORY OF ANKYLOSING SPONDYLITIS: Status: ACTIVE | Noted: 2025-05-17

## 2025-05-17 PROBLEM — R76.8 ELEVATED RHEUMATOID FACTOR: Status: ACTIVE | Noted: 2025-05-17

## 2025-05-17 PROBLEM — M79.641 BILATERAL HAND PAIN: Status: ACTIVE | Noted: 2025-05-17

## 2025-05-17 PROBLEM — M54.2 NECK PAIN: Status: ACTIVE | Noted: 2025-05-17

## 2025-05-17 PROBLEM — M79.642 BILATERAL HAND PAIN: Status: ACTIVE | Noted: 2025-05-17

## 2025-05-17 NOTE — PATIENT INSTRUCTIONS
Thanks for the new patient visit with Rheumatology   Evaluation shows low suspicion for rheumatoid Arthritis despite low titer RF  Will check HLA B27 for a predisposition to spondyloarthritis   Will have xray of neck performed   Follow up as need if any hand flare   Will call patient with labs and xray results

## (undated) DEVICE — GLV SURG TRIUMPH CLASSIC PF LTX 8.5 STRL

## (undated) DEVICE — APPL CHLORAPREP W/TINT 26ML ORNG

## (undated) DEVICE — SYR LUERLOK 20CC BX/50

## (undated) DEVICE — UNDERCAST PADDING: Brand: DEROYAL

## (undated) DEVICE — DUAL CUT SAGITTAL BLADE

## (undated) DEVICE — DRSNG BURN ACTICOAT FLEX 7 1X24IN

## (undated) DEVICE — SOL ISO/ALC RUB 70PCT 4OZ

## (undated) DEVICE — STERILE PATIENT PROTECTIVE PAD FOR IMP® KNEE POSITIONERS & COHESIVE WRAP (10 / CASE): Brand: DE MAYO KNEE POSITIONER®

## (undated) DEVICE — PK KN TOTL 40

## (undated) DEVICE — GLV SURG SENSICARE W/ALOE PF LF 9 STRL

## (undated) DEVICE — 2108 SERIES SAGITTAL BLADE, NO OFFSET  (12.4 X 1.19 X 82.1MM)

## (undated) DEVICE — GLV SURG PREMIERPRO ORTHO LTX PF SZ8.5 BRN

## (undated) DEVICE — NEEDLE, QUINCKE, 20GX3.5": Brand: MEDLINE

## (undated) DEVICE — ADHS LIQ MASTISOL 2/3ML

## (undated) DEVICE — SUT VIC 1 CT1 36IN J947H

## (undated) DEVICE — GLV SURG SENSICARE PI PF LF 8.5 GRN STRL

## (undated) DEVICE — BNDG ELAS ELITE V/CLOSE 6IN 5YD LF STRL